# Patient Record
Sex: FEMALE | Race: ASIAN | NOT HISPANIC OR LATINO | Employment: UNEMPLOYED | URBAN - METROPOLITAN AREA
[De-identification: names, ages, dates, MRNs, and addresses within clinical notes are randomized per-mention and may not be internally consistent; named-entity substitution may affect disease eponyms.]

---

## 2017-01-04 DIAGNOSIS — Z22.7 LATENT TUBERCULOSIS BY BLOOD TEST: Primary | ICD-10-CM

## 2017-09-13 ENCOUNTER — DOCUMENTATION ONLY (OUTPATIENT)
Dept: FAMILY MEDICINE | Facility: CLINIC | Age: 57
End: 2017-09-13

## 2017-09-13 NOTE — PROGRESS NOTES
Letter mailed to patient on 9/12/17 regarding overdue mammogram to call Pilar or Landy to schedule.  Pilar Osorio

## 2017-10-13 ENCOUNTER — TRANSFERRED RECORDS (OUTPATIENT)
Dept: HEALTH INFORMATION MANAGEMENT | Facility: CLINIC | Age: 57
End: 2017-10-13

## 2018-01-07 ENCOUNTER — HEALTH MAINTENANCE LETTER (OUTPATIENT)
Age: 58
End: 2018-01-07

## 2021-12-08 ENCOUNTER — HOSPITAL ENCOUNTER (EMERGENCY)
Facility: HOSPITAL | Age: 61
Discharge: HOME | End: 2021-12-08
Admitting: STUDENT IN AN ORGANIZED HEALTH CARE EDUCATION/TRAINING PROGRAM
Payer: COMMERCIAL

## 2021-12-08 ENCOUNTER — APPOINTMENT (OUTPATIENT)
Dept: ULTRASOUND IMAGING | Facility: HOSPITAL | Age: 61
End: 2021-12-08
Payer: COMMERCIAL

## 2021-12-08 ENCOUNTER — APPOINTMENT (OUTPATIENT)
Dept: CT IMAGING | Facility: HOSPITAL | Age: 61
End: 2021-12-08
Attending: STUDENT IN AN ORGANIZED HEALTH CARE EDUCATION/TRAINING PROGRAM
Payer: COMMERCIAL

## 2021-12-08 VITALS
OXYGEN SATURATION: 97 % | WEIGHT: 139.33 LBS | RESPIRATION RATE: 18 BRPM | HEART RATE: 70 BPM | TEMPERATURE: 98.3 F | BODY MASS INDEX: 27.21 KG/M2 | DIASTOLIC BLOOD PRESSURE: 94 MMHG | SYSTOLIC BLOOD PRESSURE: 159 MMHG

## 2021-12-08 DIAGNOSIS — R51.9 NONINTRACTABLE EPISODIC HEADACHE, UNSPECIFIED HEADACHE TYPE: ICD-10-CM

## 2021-12-08 DIAGNOSIS — M54.9 BACK PAIN: ICD-10-CM

## 2021-12-08 DIAGNOSIS — M79.605 PAIN OF LEFT LOWER EXTREMITY: ICD-10-CM

## 2021-12-08 LAB
ANION GAP SERPL CALCULATED.3IONS-SCNC: 8 MMOL/L (ref 5–18)
BUN SERPL-MCNC: 9 MG/DL (ref 8–22)
CALCIUM SERPL-MCNC: 9.4 MG/DL (ref 8.5–10.5)
CHLORIDE BLD-SCNC: 101 MMOL/L (ref 98–107)
CO2 SERPL-SCNC: 23 MMOL/L (ref 22–31)
CREAT SERPL-MCNC: 0.79 MG/DL (ref 0.6–1.1)
ERYTHROCYTE [DISTWIDTH] IN BLOOD BY AUTOMATED COUNT: 11.1 % (ref 10–15)
FLUAV RNA SPEC QL NAA+PROBE: NEGATIVE
FLUBV RNA RESP QL NAA+PROBE: NEGATIVE
GFR SERPL CREATININE-BSD FRML MDRD: 80 ML/MIN/1.73M2
GLUCOSE BLD-MCNC: 142 MG/DL (ref 70–125)
HCT VFR BLD AUTO: 41 % (ref 35–47)
HGB BLD-MCNC: 14.4 G/DL (ref 11.7–15.7)
MCH RBC QN AUTO: 33.3 PG (ref 26.5–33)
MCHC RBC AUTO-ENTMCNC: 35.1 G/DL (ref 31.5–36.5)
MCV RBC AUTO: 95 FL (ref 78–100)
PLATELET # BLD AUTO: 316 10E3/UL (ref 150–450)
POTASSIUM BLD-SCNC: 4.3 MMOL/L (ref 3.5–5)
RBC # BLD AUTO: 4.32 10E6/UL (ref 3.8–5.2)
SARS-COV-2 RNA RESP QL NAA+PROBE: NEGATIVE
SODIUM SERPL-SCNC: 132 MMOL/L (ref 136–145)
WBC # BLD AUTO: 6 10E3/UL (ref 4–11)

## 2021-12-08 PROCEDURE — 36415 COLL VENOUS BLD VENIPUNCTURE: CPT | Performed by: PHYSICIAN ASSISTANT

## 2021-12-08 PROCEDURE — 70450 CT HEAD/BRAIN W/O DYE: CPT

## 2021-12-08 PROCEDURE — C9803 HOPD COVID-19 SPEC COLLECT: HCPCS

## 2021-12-08 PROCEDURE — 85027 COMPLETE CBC AUTOMATED: CPT | Performed by: PHYSICIAN ASSISTANT

## 2021-12-08 PROCEDURE — 99285 EMERGENCY DEPT VISIT HI MDM: CPT | Mod: 25

## 2021-12-08 PROCEDURE — 87636 SARSCOV2 & INF A&B AMP PRB: CPT | Performed by: PHYSICIAN ASSISTANT

## 2021-12-08 PROCEDURE — 93971 EXTREMITY STUDY: CPT | Mod: LT

## 2021-12-08 PROCEDURE — 80048 BASIC METABOLIC PNL TOTAL CA: CPT | Performed by: PHYSICIAN ASSISTANT

## 2021-12-08 RX ORDER — ACETAMINOPHEN 325 MG/1
975 TABLET ORAL ONCE
Status: DISCONTINUED | OUTPATIENT
Start: 2021-12-08 | End: 2021-12-08

## 2021-12-08 RX ORDER — IBUPROFEN 200 MG
400 TABLET ORAL EVERY 6 HOURS PRN
Qty: 30 TABLET | Refills: 0 | Status: SHIPPED | OUTPATIENT
Start: 2021-12-08 | End: 2024-01-24

## 2021-12-08 NOTE — ED PROVIDER NOTES
Headache, back pain and leg pain.    Symptoms started a long time ago.  Since yesterday all symptoms have presented together and are worsening.      No chest pain, abdominal pain.  No difficulty breathing.      Has had fevers but these are helped with tylenol.     Left knee pain.           Ginny Reeder PA-C  12/08/21 9769

## 2021-12-08 NOTE — ED PROVIDER NOTES
ED PROVIDER NOTE    EMERGENCY DEPARTMENT ENCOUNTER      NAME: Doug Davidson  AGE: 61 year old female  YOB: 1960  MRN: [id number]  EVALUATION DATE & TIME: No admission date for patient encounter.    PCP: [facility]    ED PROVIDER: Ginny Reeder PA-C      Chief Complaint   Patient presents with     Headache     Back Pain         FINAL IMPRESSION:  1. Nonintractable episodic headache, unspecified headache type    2. Back pain    3. Pain of left lower extremity          MEDICAL DECISION MAKING:    Pertinent Labs & Imaging studies reviewed. (See chart for details)  61 year old female with a h/o cholelithiasis, HTN, gastritic, constipation, depression presents to the Emergency Department for evaluation of headache, leg pain and back pain.  It sounds like she has had all the symptoms for quite some time but since yesterday they have all presented together.  Tylenol does help.  No chest pain, difficulty breathing, abdominal pain.    She is hypertensive here but otherwise vital stable.  Clinically looks quite well.  No reproducible tenderness in the neck, back or extremities.  Presentation seems most consistent with chronic pain, perhaps some arthritis and tension headache however there is still some language barrier even with an  and I think it is reasonable to get a scan of her head, ultrasound of the left leg to make sure there is no signs of DVT.  We will also just check a CBC and BMP.  Anemia, metabolic imbalance, dehydration, [place] may cause headaches as well.    Workup reveals slighty hyponatremia but I do not feel this is causing her symptoms.  Will encourage electrolytes at home  Covid negative. CBC unremarkable.  Ct head unremarkable. US left leg negative.    No acute findings on exam or workup today. Patient stable for discharge home. Encouraged hydration and close f/u with pcp.      At the conclusion of the encounter I discussed the results of all of the tests and the disposition. The  "questions were answered. The patient or family acknowledged understanding and was agreeable with the care plan.       ED COURSE  12:20 PM  Met and evaluated patient. Discussed ED plan. PPE: N95 mask, gloves, protective eyewear  2:33 PM  Updated patient on results and plan for discharge.        MEDICATIONS GIVEN IN THE EMERGENCY:  Medications - No data to display    NEW PRESCRIPTIONS STARTED AT TODAY'S ER VISIT  New Prescriptions    No medications on file          =================================================================    HPI    Patient information was obtained from: Patient    Use of Intrepreter: Yes (phone) - Language: Lithuanian via language line      Doug Davidson is a 61 year old female with a history cholelithiasis, HTN, gastritic, constipation, depression, who presents for evaluation of headache, back pain and leg pain.  Reports that she has had pain in all these areas for several years but usually they present independently of one another.  Since yesterday she has been experiencing all of the symptoms at once and they seem to be worsening.  She has been using Tylenol which does seem to temporarily help with her symptoms.  Her last dose was prior to coming to the emergency department.  She reports that her leg pain is a little bit worse in the left leg, behind the left knee.  She denies any chest pain, abdominal pain, cough, difficulty breathing.  She reports \"fevers\" but that they are helped with Tylenol.  Denies any new trauma.  No  Numbness, weakness, vision changes      REVIEW OF SYSTEMS   Constitutional: + subjective fevers  Vision: Negative for vision changes  HENT:  + headache.  Negative for congestion, sore throat  Pulmonary: Negative for shortness of breath  Cardiac: Negative for chest pain  GI:Negative for nausea, vomiting, diarrhea, abdominal pain  : Negative for urinary symptoms  Musculoskeletal: + back pain and leg pain  Skin: Negative for skin changes  Endocrine: Negative for weight " loss  Neuro: Negative for weakness, numbness    All other systems reviewed and are negative        PAST MEDICAL HISTORY:  HTN    PAST SURGICAL HISTORY:  History reviewed. No pertinent surgical history.        CURRENT MEDICATIONS:    No current facility-administered medications for this encounter.    Current Outpatient Medications:      ranitidine (ZANTAC) 300 MG tablet, Take 1 tablet (300 mg) by mouth At Bedtime, Disp: 30 tablet, Rfl: 1    ALLERGIES:  No Known Allergies    FAMILY HISTORY:  History reviewed. No pertinent family history.    SOCIAL HISTORY:   Smoking: Denies      VITALS:  Vitals:    12/08/21 1127   BP: (!) 188/102   Pulse: 74   Resp: 18   Temp: 98  F (36.7  C)   TempSrc: Oral   SpO2: 92%   Weight: 63.2 kg (139 lb 5.3 oz)       PHYSICAL EXAM    General Appearance:  Alert, cooperative, no distress, appears stated age  HENT: Normocephalic without obvious deformity, atraumatic. Mucous membranes moist. No meningismus.   Eyes: Conjunctiva clear, Lids normal. No discharge.   Respiratory: No distress. Lungs clear to ausculation bilaterally. No wheezes, rhonchi or stridor  Cardiovascular: Regular rate and rhythm, no murmur. Normal cap refill. No peripheral edema  GI: Abdomen soft, nontender  : No CVA tenderness  Musculoskeletal: Moving all extremities. No gross deformities. No neck or back tenderness. Normal ROM of all the extremities. No swelling.  2+ DP pulses.    Integument: Warm, dry, no rashes or lesions  Neurologic: Alert and orientated x3. No focal deficits. Face symmetric. PERRL. EOMI.   Psych: Normal mood and affect        LAB:  Labs Ordered and Resulted from Time of ED Arrival to Time of ED Departure   BASIC METABOLIC PANEL - Abnormal       Result Value    Sodium 132 (*)     Potassium 4.3      Chloride 101      Carbon Dioxide (CO2) 23      Anion Gap 8      Urea Nitrogen 9      Creatinine 0.79      Calcium 9.4      Glucose 142 (*)     GFR Estimate 80     CBC WITH PLATELETS - Abnormal    WBC Count 6.0       RBC Count 4.32      Hemoglobin 14.4      Hematocrit 41.0      MCV 95      MCH 33.3 (*)     MCHC 35.1      RDW 11.1      Platelet Count 316     INFLUENZA A/B & SARS-COV2 PCR MULTIPLEX - Normal    Influenza A PCR Negative      Influenza B PCR Negative      SARS CoV2 PCR Negative         RADIOLOGY:  US Lower Extremity Venous Duplex Left   Final Result   IMPRESSION:   1.  No deep venous thrombosis in the left lower extremity.      Head CT w/o contrast   Final Result   IMPRESSION:   1.  No acute intracranial hemorrhage, mass effect, or CT evidence for acute infarction.   2.  Mild global brain parenchymal volume loss.   3.  Mild mucosal thickening throughout the paranasal sinuses, greatest in the ethmoid air cells and sphenoid sinuses. While no layering fluid levels are identified, recommend correlation for acute sinusitis as a source of headache.              I, Pennie Martin, am serving as a scribe to document services personally performed by Ginny Reeder PA-C based on my observation and the provider's statements to me. I, Ginny Reeder PA-C attest that Pennie Martin is acting in a scribe capacity, has observed my performance of the services and has documented them in accordance with my direction.    Ginny Reeder PA-C   Emergency Medicine       Ginny Reeder PA-C  12/09/21 8517

## 2021-12-08 NOTE — ED TRIAGE NOTES
Pt comes in with head and back pain that started yesterday. Speaks Maori, language barrier. Took some medicine yesterday, unable to recall what is was. Pt takes 8 or 9 medications per daughter. Pt had fever yesterday. States vaccinated for Covid.

## 2021-12-08 NOTE — DISCHARGE INSTRUCTIONS
Your work-up today does not show any emergent findings.    Your head CT does show some inflammation of the sinuses but this is likely more viral.  Your covid test was negative    Your lab work just shows a little low sodium.  Would make sure you are drinking some fluids with electrolytes and eating a good diet at home.    Please schedule a follow up with your primary care provider in the next week.      If you have worsening pain, fevers, vomiting, neurologic changes, return to the ER.

## 2021-12-08 NOTE — ED PROVIDER NOTES
ED Provider In Triage Note  [facility]  Encounter Date: Dec 8, 2021    Chief Complaint   Patient presents with     Headache     Back Pain       Brief HPI:   Doug Davidson is a 61 year old female presenting to the Emergency Department with a chief complaint of head and back pain. Tried some sort of medication 1 hour PTA, no improvement. Sounds like Tylenol? No history of trauma. Ambulatory.     Brief Physical Exam:  BP (!) 188/102   Pulse 74   Temp 98  F (36.7  C) (Oral)   Resp 18   Wt 63.2 kg (139 lb 5.3 oz)   SpO2 92%   BMI 27.21 kg/m    General: Non-toxic appearing  HEENT: Atraumatic  Resp: No respiratory distress  Abdomen: Non-peritoneal  Neuro: Alert, oriented, answers questions appropriately  Psych: Behavior appropriate    PIT Dispo:   Return to lobby while awaiting workup and ED bed availability    Nelson Hernandez MD on 12/8/2021 at 11:25 AM    Patient was evaluated by the Physician in Triage due to a limitation of available rooms in the Emergency Department. A plan of care was discussed based on the information obtained on the initial evaluation and patient was consuled to return back to the Emergency Department lobby after this initial evalutaiton until results were obtained or a room became available in the Emergency Department. Patient was counseled not to leave prior to receiving the results of their workup.      Nelson Hernandez MD  12/08/21 1576     Dr. Lobo

## 2022-05-01 ENCOUNTER — HOSPITAL ENCOUNTER (EMERGENCY)
Facility: HOSPITAL | Age: 62
Discharge: HOME | End: 2022-05-01
Attending: EMERGENCY MEDICINE | Admitting: EMERGENCY MEDICINE
Payer: COMMERCIAL

## 2022-05-01 ENCOUNTER — APPOINTMENT (OUTPATIENT)
Dept: RADIOLOGY | Facility: HOSPITAL | Age: 62
End: 2022-05-01
Attending: EMERGENCY MEDICINE
Payer: COMMERCIAL

## 2022-05-01 ENCOUNTER — APPOINTMENT (OUTPATIENT)
Dept: MRI IMAGING | Facility: HOSPITAL | Age: 62
End: 2022-05-01
Attending: EMERGENCY MEDICINE
Payer: COMMERCIAL

## 2022-05-01 VITALS
TEMPERATURE: 96.8 F | RESPIRATION RATE: 15 BRPM | WEIGHT: 143.3 LBS | DIASTOLIC BLOOD PRESSURE: 71 MMHG | OXYGEN SATURATION: 95 % | HEART RATE: 73 BPM | BODY MASS INDEX: 27.99 KG/M2 | SYSTOLIC BLOOD PRESSURE: 135 MMHG

## 2022-05-01 DIAGNOSIS — H81.10 BENIGN PAROXYSMAL POSITIONAL VERTIGO, UNSPECIFIED LATERALITY: ICD-10-CM

## 2022-05-01 DIAGNOSIS — M25.562 ACUTE PAIN OF LEFT KNEE: ICD-10-CM

## 2022-05-01 LAB
ANION GAP SERPL CALCULATED.3IONS-SCNC: 13 MMOL/L (ref 5–18)
APTT PPP: 27 SECONDS (ref 22–38)
BUN SERPL-MCNC: 13 MG/DL (ref 8–22)
CALCIUM SERPL-MCNC: 9.5 MG/DL (ref 8.5–10.5)
CHLORIDE BLD-SCNC: 101 MMOL/L (ref 98–107)
CO2 SERPL-SCNC: 19 MMOL/L (ref 22–31)
CREAT SERPL-MCNC: 1.03 MG/DL (ref 0.6–1.1)
ERYTHROCYTE [DISTWIDTH] IN BLOOD BY AUTOMATED COUNT: 11.3 % (ref 10–15)
GFR SERPL CREATININE-BSD FRML MDRD: 61 ML/MIN/1.73M2
GLUCOSE BLD-MCNC: 128 MG/DL (ref 70–125)
GLUCOSE BLDC GLUCOMTR-MCNC: 134 MG/DL (ref 70–99)
HCT VFR BLD AUTO: 37.4 % (ref 35–47)
HGB BLD-MCNC: 13.4 G/DL (ref 11.7–15.7)
INR PPP: 1.03 (ref 0.85–1.15)
MCH RBC QN AUTO: 33.5 PG (ref 26.5–33)
MCHC RBC AUTO-ENTMCNC: 35.8 G/DL (ref 31.5–36.5)
MCV RBC AUTO: 94 FL (ref 78–100)
PLATELET # BLD AUTO: 318 10E3/UL (ref 150–450)
POTASSIUM BLD-SCNC: 3.8 MMOL/L (ref 3.5–5)
RBC # BLD AUTO: 4 10E6/UL (ref 3.8–5.2)
SODIUM SERPL-SCNC: 133 MMOL/L (ref 136–145)
TROPONIN I SERPL-MCNC: 0.01 NG/ML (ref 0–0.29)
WBC # BLD AUTO: 6.5 10E3/UL (ref 4–11)

## 2022-05-01 PROCEDURE — 85730 THROMBOPLASTIN TIME PARTIAL: CPT | Performed by: EMERGENCY MEDICINE

## 2022-05-01 PROCEDURE — 36415 COLL VENOUS BLD VENIPUNCTURE: CPT | Performed by: EMERGENCY MEDICINE

## 2022-05-01 PROCEDURE — 70551 MRI BRAIN STEM W/O DYE: CPT

## 2022-05-01 PROCEDURE — 99285 EMERGENCY DEPT VISIT HI MDM: CPT | Mod: 25

## 2022-05-01 PROCEDURE — 85027 COMPLETE CBC AUTOMATED: CPT | Performed by: EMERGENCY MEDICINE

## 2022-05-01 PROCEDURE — 84484 ASSAY OF TROPONIN QUANT: CPT | Performed by: EMERGENCY MEDICINE

## 2022-05-01 PROCEDURE — 73560 X-RAY EXAM OF KNEE 1 OR 2: CPT | Mod: LT

## 2022-05-01 PROCEDURE — 250N000011 HC RX IP 250 OP 636: Performed by: EMERGENCY MEDICINE

## 2022-05-01 PROCEDURE — 85610 PROTHROMBIN TIME: CPT | Performed by: EMERGENCY MEDICINE

## 2022-05-01 PROCEDURE — 80048 BASIC METABOLIC PNL TOTAL CA: CPT | Performed by: EMERGENCY MEDICINE

## 2022-05-01 PROCEDURE — 93005 ELECTROCARDIOGRAM TRACING: CPT | Performed by: EMERGENCY MEDICINE

## 2022-05-01 RX ORDER — MECLIZINE HYDROCHLORIDE 25 MG/1
25 TABLET ORAL EVERY 8 HOURS PRN
Qty: 10 TABLET | Refills: 0 | Status: SHIPPED | OUTPATIENT
Start: 2022-05-01

## 2022-05-01 RX ORDER — LORAZEPAM 2 MG/ML
0.5 INJECTION INTRAMUSCULAR ONCE
Status: COMPLETED | OUTPATIENT
Start: 2022-05-01 | End: 2022-05-01

## 2022-05-01 RX ORDER — MECLIZINE HYDROCHLORIDE 25 MG/1
25 TABLET ORAL EVERY 8 HOURS PRN
Qty: 10 TABLET | Refills: 0 | Status: SHIPPED | OUTPATIENT
Start: 2022-05-01 | End: 2024-09-26

## 2022-05-01 RX ADMIN — LORAZEPAM 0.5 MG: 2 INJECTION INTRAMUSCULAR; INTRAVENOUS at 14:07

## 2022-05-01 NOTE — ED NOTES
Nursing assessment--    Patient is alert, daughter at the bedside.  Using language line to explain and go over the MRI checklist.  Patient states a sudden onset of dizziness.  Using language line, patient does follow simple commands.  No focal weakness noted. No vision changes.  Is alert and oriented X 3.  She feels as the room is spinning.  Explained ativan medication to her, using to aleve dizziness.

## 2022-05-01 NOTE — DISCHARGE INSTRUCTIONS
Please follow-up with your Primary Care Provider within 3-4 days for a recheck; call to arrange appointment.    Return to the ER for worsening symptoms, sudden onset or severe headache, focal neurologic deficit (for example, facial droop or right arm weakness), worsening knee pain, if the knee becomes red / hot / swollen,, persistent nausea / vomiting, fever or other concerns.

## 2022-05-01 NOTE — ED PROVIDER NOTES
Emergency Department Encounter     Evaluation Date & Time:   2022  1:07 PM    CHIEF COMPLAINT:  Dizziness      Triage Note:  Patient arrives by private car with her daughter for evaluation of sudden onset of dizziness that started approx 30 minutes ago.  Patient reports headache.  Was shopping when symptoms started.      Triage Assessment     Row Name 22 1258       Triage Assessment (Adult)    Airway WDL WDL    Additional Documentation Gladys Coma Scale (Group)       [place] Coma Scale    Best Eye Response 2-->(E2) to pain    Best Motor Response 5-->(M5) localizes pain    Best Verbal Response 5-->(V5) oriented    Gladys Coma Scale Score 12                    Impression and Plan       FINAL IMPRESSION:    ICD-10-CM    1. Benign paroxysmal positional vertigo, unspecified laterality  H81.10    2. Acute pain of left knee  M25.562          ED COURSE & MEDICAL DECISION MAKIN:04 PM Met patient in triage for neuro assessment  1:14 PM I discussed patient's case with Dr. Dickerson, stroke neurology.   4:04 PM Patient feeling significantly improved. Now is complaining of atraumatic left knee pain x 4-5 days and is requesting x-ray.     Doug Davidson is a 61 year old female, history of HTN, who presents for evaluation of room-spinning dizziness that started ~30 minutes ago, worse with opening her eyes. She has associated nausea without vomiting. She reports gradual onset of headache this morning, around 8-9am (4-5 hours ago). She is not anticoagulated.    On presentation, patient appears uncomfortable and is sitting with her eyes closed. She is not fully cooperative with neuro exam, however there are no gross deficits.    I spoke with the Stroke Neurologist who agrees with plan to perform MRI rather than head CT. Clinical picture is more consistent with peripheral etiology of vertigo, thus no stroke code called. Furthermore, if MRI demonstrates a cerebellar stroke, would not likely treat with TNK.    Patient  placed on cardiac monitor, IV access established and blood sent for labs.    Patient given Ativan for vertigo.    EKG performed and demonstrated NSR with borderline prolonged QTc (490ms) with no ischemic changes; troponin WNL (0.01).    Labs remarkable for no leukocytosis, anemia, significant electrolyte derangements or renal impairment.  Coags WNL.    MRI brain with minimal age-related changes and no acute intracranial abnormality.    On reassessment, patient is feeling significantly improved after Ativan.      She is now reporting 4-5 days of atraumatic left knee pain and requesting x-ray.    On exam, there is no appreciable effusion, warmth, erythema and she has good ROM of the knee - I do not suspect septic joint. X-rays left knee with demineralized bones with no evidence of a fracture; mild medial compartment degenerative change; no joint effusion.      Patient discharged to home with follow-up primary care provider.  She was given a prescription for meclizine.  Return precautions provided with assistance from the professional Portuguese .  Patient stable throughout ED course.      At the conclusion of the encounter I discussed the results of all the tests and the disposition. The questions were answered. The patient and family acknowledged understanding and were agreeable with the care plan.      MEDICATIONS GIVEN IN THE EMERGENCY DEPARTMENT:  Medications   LORazepam (ATIVAN) injection 0.5 mg (0.5 mg Intravenous Given 5/1/22 1407)       NEW PRESCRIPTIONS STARTED AT TODAY'S ED VISIT:  Discharge Medication List as of 5/1/2022  5:05 PM      START taking these medications    Details   meclizine (ANTIVERT) 25 MG tablet Take 1 tablet (25 mg) by mouth every 8 hours as needed for dizziness, Disp-10 tablet, R-0, Local Print             HPI     HPI     Secondary to language barrier, history and exam performed with assistance from the professional Portuguese  using the language line on [employer].     History  currently limited secondary to patient's condition.     Doug Davidson is a 61 year old female, history of HTN, who presents to this ED ambulatory with her daughter for evaluation of dizziness. The dizziness started acutely ~30 minutes ago. She describes the dizziness as feeling as though the room is spinning. Symptoms are worse when she opens her eyes. She has associated nausea without vomiting. She had gradual onset of headache this morning, around 8-9am (4-5 hours ago). She is not anticoagulated.    REVIEW OF SYSTEMS:  All other systems reviewed and are negative.      Medical History     No past medical history on file.    No past surgical history on file.    No family history on file.    Social History     Tobacco Use     Smoking status: Current Some Day Smoker       meclizine (ANTIVERT) 25 MG tablet  ibuprofen (ADVIL/MOTRIN) 200 MG tablet  ranitidine (ZANTAC) 300 MG tablet        Physical Exam     First Vitals:  Patient Vitals for the past 24 hrs:   BP Temp Pulse Resp SpO2 Weight   05/01/22 1700 135/71 -- 73 -- 95 % --   05/01/22 1600 139/88 -- 77 -- 99 % --   05/01/22 1445 123/80 -- 72 15 96 % --   05/01/22 1430 112/75 -- 70 23 98 % --   05/01/22 1415 119/68 -- 72 16 96 % --   05/01/22 1400 118/74 -- 72 10 96 % --   05/01/22 1345 119/64 -- 68 11 96 % --   05/01/22 1330 136/77 -- 68 16 92 % --   05/01/22 1251 119/73 96.8  F (36  C) 65 18 99 % 65 kg (143 lb 4.8 oz)       PHYSICAL EXAM:   Physical Exam    GENERAL: Awake, alert.  In moderate acute distress; patient sitting in wheelchair with her eyes closed.   HEENT: Normocephalic, atraumatic. Pupils equal, round and reactive. Conjunctiva normal. Patient not cooperative with EOM testing. TMs normal, BL, without erythema.  NECK: No stridor.  PULMONARY: Symmetrical breath sounds without distress.  Lungs clear to auscultation bilaterally without wheezes, rhonchi or rales.  CARDIO: Regular rate and rhythm.  No significant murmur, rub or gallop.  Radial pulses strong  and symmetrical.  ABDOMINAL: Abdomen soft, non-distended and non-tender to palpation.   EXTREMITIES: Left knee without swelling / effusion, warmth or erythema. No lower extremity swelling or edema.      NEURO: Patient not cooperative with full neuro exam. She is alert and answers some questions - answers are appropriate, however she does not answer all questions. Cranial nerves grossly intact.  Strength 5/5 BL upper and lower extremities. Patient not cooperative with finger-nose-finger testing or heel-shin testing.   PSYCH: Unable to assess.   SKIN: No rashes.     Results     LAB:  All pertinent labs reviewed and interpreted  Labs Ordered and Resulted from Time of ED Arrival to Time of ED Departure   CBC WITH PLATELETS - Abnormal       Result Value    WBC Count 6.5      RBC Count 4.00      Hemoglobin 13.4      Hematocrit 37.4      MCV 94      MCH 33.5 (*)     MCHC 35.8      RDW 11.3      Platelet Count 318     BASIC METABOLIC PANEL - Abnormal    Sodium 133 (*)     Potassium 3.8      Chloride 101      Carbon Dioxide (CO2) 19 (*)     Anion Gap 13      Urea Nitrogen 13      Creatinine 1.03      Calcium 9.5      Glucose 128 (*)     GFR Estimate 61     GLUCOSE BY METER - Abnormal    GLUCOSE BY METER POCT 134 (*)    TROPONIN I - Normal    Troponin I 0.01     INR - Normal    INR 1.03     PARTIAL THROMBOPLASTIN TIME - Normal    aPTT 27         RADIOLOGY:  XR Knee Left 1/2 Views   Final Result   IMPRESSION: Bones are demineralized. No evidence of a fracture. Mild medial compartment degenerative change. No joint effusion.      MR Brain w/o Contrast   Final Result   IMPRESSION:   1.  Minimal age-related changes as above with no acute intracranial abnormality.        EC2022, 13:26; NSR with rate of 65 bpm; borderline prolonged QTc (490ms); no ST-T wave changes consistent with ACS or pericarditis; compared to previous EKG dated 2021, there are no significant changes    EKG independently reviewed and interpreted by  MD Latrice Borja MD  Emergency Medicine  [facility] EMERGENCY DEPARTMENT           Latrice Stein MD  05/01/22 0671

## 2022-05-01 NOTE — ED TRIAGE NOTES
Patient arrives by private car with her daughter for evaluation of sudden onset of dizziness that started approx 30 minutes ago.  Patient reports headache.  Was shopping when symptoms started.      Triage Assessment     Row Name 05/01/22 1258       Triage Assessment (Adult)    Airway WDL WDL    Additional Documentation Gladys Coma Scale (Group)       [place] Coma Scale    Best Eye Response 2-->(E2) to pain    Best Motor Response 5-->(M5) localizes pain    Best Verbal Response 5-->(V5) oriented    Gladys Coma Scale Score 12

## 2022-05-02 LAB
ATRIAL RATE - MUSE: 65 BPM
DIASTOLIC BLOOD PRESSURE - MUSE: 75 MMHG
INTERPRETATION ECG - MUSE: NORMAL
P AXIS - MUSE: 71 DEGREES
PR INTERVAL - MUSE: 154 MS
QRS DURATION - MUSE: 82 MS
QT - MUSE: 472 MS
QTC - MUSE: 490 MS
R AXIS - MUSE: 14 DEGREES
SYSTOLIC BLOOD PRESSURE - MUSE: 131 MMHG
T AXIS - MUSE: 55 DEGREES
VENTRICULAR RATE- MUSE: 65 BPM

## 2024-01-24 ENCOUNTER — HOSPITAL ENCOUNTER (EMERGENCY)
Facility: HOSPITAL | Age: 64
Discharge: HOME | End: 2024-01-24
Attending: EMERGENCY MEDICINE | Admitting: EMERGENCY MEDICINE
Payer: COMMERCIAL

## 2024-01-24 ENCOUNTER — APPOINTMENT (OUTPATIENT)
Dept: ULTRASOUND IMAGING | Facility: HOSPITAL | Age: 64
End: 2024-01-24
Attending: EMERGENCY MEDICINE
Payer: COMMERCIAL

## 2024-01-24 VITALS
DIASTOLIC BLOOD PRESSURE: 89 MMHG | HEART RATE: 85 BPM | SYSTOLIC BLOOD PRESSURE: 160 MMHG | OXYGEN SATURATION: 97 % | RESPIRATION RATE: 18 BRPM | TEMPERATURE: 99.6 F

## 2024-01-24 DIAGNOSIS — M25.562 CHRONIC PAIN OF LEFT KNEE: ICD-10-CM

## 2024-01-24 DIAGNOSIS — G89.29 CHRONIC PAIN OF LEFT KNEE: ICD-10-CM

## 2024-01-24 DIAGNOSIS — M25.862 KNEE JOINT CYST, LEFT: ICD-10-CM

## 2024-01-24 PROBLEM — F41.1 GAD (GENERALIZED ANXIETY DISORDER): Status: ACTIVE | Noted: 2018-04-16

## 2024-01-24 PROBLEM — R19.5 HEME POSITIVE STOOL: Status: ACTIVE | Noted: 2020-08-31

## 2024-01-24 PROBLEM — K29.50 GASTRITIS, CHRONIC: Status: ACTIVE | Noted: 2022-03-23

## 2024-01-24 PROBLEM — Z86.19 HISTORY OF HELICOBACTER PYLORI INFECTION: Status: ACTIVE | Noted: 2023-03-09

## 2024-01-24 PROBLEM — R73.03 PREDIABETES: Status: ACTIVE | Noted: 2023-03-09

## 2024-01-24 PROBLEM — F32.3 MAJOR DEPRESSIVE DISORDER, SINGLE EPISODE, SEVERE WITH PSYCHOTIC FEATURES (H): Status: ACTIVE | Noted: 2018-04-16

## 2024-01-24 PROBLEM — I10 ESSENTIAL HYPERTENSION: Status: ACTIVE | Noted: 2019-09-12

## 2024-01-24 PROCEDURE — 99284 EMERGENCY DEPT VISIT MOD MDM: CPT | Mod: 25

## 2024-01-24 PROCEDURE — 93971 EXTREMITY STUDY: CPT | Mod: LT

## 2024-01-24 RX ORDER — TRAMADOL HYDROCHLORIDE 50 MG/1
50 TABLET ORAL 2 TIMES DAILY PRN
Qty: 10 TABLET | Refills: 0 | Status: SHIPPED | OUTPATIENT
Start: 2024-01-24 | End: 2024-09-26

## 2024-01-24 RX ORDER — IBUPROFEN 200 MG
400 TABLET ORAL EVERY 6 HOURS PRN
Qty: 30 TABLET | Refills: 0 | Status: SHIPPED | OUTPATIENT
Start: 2024-01-24

## 2024-01-24 NOTE — ED TRIAGE NOTES
Left leg pain x 2 days. Denies known injury. Patient has an ace wrap in place under her pant leg. States the pain extends above and below the left knee. Occasionally has lower back pain. Son at bedside and interpreting.

## 2024-01-24 NOTE — ED NOTES
Pt assessed and discharged by provider from [facility]. Pt was discharged to home with family. Ambulated using walker. Instructed to get rx at pharmacy and follow up with the home health referral.

## 2024-01-24 NOTE — ED PROVIDER NOTES
Emergency Department Encounter     Evaluation Date & Time:   No admission date for patient encounter.    CHIEF COMPLAINT:  Leg Pain      Triage Note:Left leg pain x 2 days. Denies known injury. Patient has an ace wrap in place under her pant leg. States the pain extends above and below the left knee. Occasionally has lower back pain. Son at bedside and interpreting.             FINAL IMPRESSION:    ICD-10-CM    1. Knee joint cyst, left  M25.862 Walker Order     Home Care Referral      2. Chronic pain of left knee  M25.562 Home Care Referral    G89.29           Impression and Plan     ED COURSE & MEDICAL DECISION MAKING:  3:22 PM I met with the patient to obtain patient history and performed a physical exam. Discussed plan for ED work up including potential diagnostic studies and interventions.        ED Course as of 01/24/24 1706   Wed Jan 24, 2024   1543 Doug has a stable knee on examination without signs of septic joint, or traumatic effusion.  This is a chronic knee pain for her that is gotten worse.  Her ultrasound was already done from the waiting room which shows likely a chondral cyst on the posterior aspect of the knee and may be worsening her pain.  She does have an orthopedist that she has seen for this problem in the past and they do feel comfortable going back to see that same provider so they will make an appointment there.  She would feel that she would benefit from a walker as she does have significant difficulty getting around so I am what writing for a walker.  She is also interested in getting a shower chair so I have directed them to reach out to her primary care provider to get that set up and   However, with her mobility issues and with the increasing pain I do think she would benefit from a home safety evaluation and that should help to get her set up with the bathing chair.  Our care coordinator did speak with the family and the order was placed in the computer for home health care referral  for ADLs, home safety, PT, OT, and social work, assistance with bathing    At the conclusion of the encounter I discussed the results of all the tests and the disposition. The questions were answered. The patient or family acknowledged understanding and was agreeable with the care plan.          0 minutes of critical care time        MEDICATIONS GIVEN IN THE EMERGENCY DEPARTMENT:  Medications - No data to display    NEW PRESCRIPTIONS STARTED AT TODAY'S ED VISIT:  New Prescriptions    TRAMADOL (ULTRAM) 50 MG TABLET    Take 1 tablet (50 mg) by mouth 2 times daily as needed for severe pain (and to help with sleep due to pain at nighttime)       HPI     HPI     Doug Davidson is a 63 year old female with a pertinent history of HTN, HLD, chronic pain, who presents to this ED via walk-in with son for evaluation of knee pain.     Patient reports sudden onset left knee pain since yesterday evening that has progressively worsened in pain. She has been using a wrap over the knee and ointment that was prescribed by her PCP with no relief of the pain. The pain is worse with movement of the knee. She associates a subjective fever secondary to the pain. She denies any known trauma or injuries to the knee. She is still able to use a cane to ambulate.There were no other concerns/complaints at this time.    REVIEW OF SYSTEMS:  Review of Systems  remainder of systems are all otherwise negative.        Medical History     History reviewed. No pertinent past medical history.    History reviewed. No pertinent surgical history.    History reviewed. No pertinent family history.    Social History     Tobacco Use    Smoking status: Some Days       ibuprofen (ADVIL/MOTRIN) 200 MG tablet  traMADol (ULTRAM) 50 MG tablet  meclizine (ANTIVERT) 25 MG tablet  meclizine (ANTIVERT) 25 MG tablet  ranitidine (ZANTAC) 300 MG tablet        Physical Exam     First Vitals:  Patient Vitals for the past 24 hrs:   BP Temp Temp src Pulse Resp SpO2    01/24/24 1335 (!) 133/98 99.6  F (37.6  C) Temporal 100 20 98 %       PHYSICAL EXAM:   Constitutional:   Sitting in chair. Easily conversable.   HENT:  Normocephalic, wearing a mask  Eyes:  PERRL, EOMI, Conjunctiva normal, No discharge, no scleral icterus.  Respiratory:  Breathing easily, lungs clear to auscultation  Cardiovascular:  Regular rate and rhythm. nl s1s2 0 murmurs, rubs, or gallops.  Peripheral pulses dp, pt, and radial are wnl.  No peripheral edema   GI:  Bowel sounds normal, Soft, No tenderness, No flank tenderness, nondistended.  :No CVA tenderness.   Musculoskeletal:  Moves all extremities.  No erythematous or swollen major joints. Anterior and posterior drawer  test are normal. Active ROM causes pain. No clinking or clunking. Mild joint effusion. No surrounding redness, swelling, or increased wramth.   Integument:  Normal color.  Lymphatic:  No cervical lymphadenopathy  Neurologic:  Alert & oriented x 3, Normal motor function, Normal sensory function, No focal deficits noted. Normal speech.  Psychiatric:  Affect normal, Judgment normal, Mood normal.     Results     LAB AND RADIOLOGY:  All pertinent labs reviewed and interpreted  Results for orders placed or performed during the hospital encounter of 01/24/24   US Lower Extremity Venous Duplex Left     Status: None    Narrative    EXAM: US LOWER EXTREMITY VENOUS DUPLEX LEFT  LOCATION: [facility]  DATE: 1/24/2024    INDICATION: Left leg pain.  COMPARISON: None.  TECHNIQUE: Venous Duplex ultrasound of the left lower extremity with and without compression, augmentation and duplex. Color flow and spectral Doppler with waveform analysis performed.    FINDINGS: Exam includes the common femoral, femoral, popliteal, and contralateral common femoral veins as well as segmentally visualized deep calf veins and greater saphenous vein.     LEFT: No deep vein thrombosis. No superficial thrombophlebitis. Fluid or cyst along the  popliteal fossa measuring 3.4 x 2.8 x 0.6 cm.      Impression    IMPRESSION:  1.  No deep venous thrombosis in the left lower extremity.         ECG:  None    PROCEDURES:  Procedures:      [facility] Documentation     Medical Decision Making  Obtained supplemental history:Supplemental history obtained?: No  Reviewed external records: External records reviewed?: yes.  Previous er visits and rx  Care impacted by chronic illness:Chronic Pain, Hyperlipidemia, and Hypertension  Care significantly affected by social determinants of health:Access to Medical Care  Did you consider but not order tests?: Work up considered but not performed and documented in chart, if applicable  Did you interpret images independently?: Independent interpretation of ECG and images noted in documentation, when applicable.  Consultation discussion with other provider:Did you involve another provider (consultant, , pharmacy, etc.)?: No  Discharge. I prescribed additional prescription strength medication(s) as charted. See documentation for any additional details.         The creation of this record is based on the scribe s observations of the work being performed by Earlene Florentino and the provider s statements to them. This document has been checked and approved by MD Herlinda Sarkar MD  Emergency Medicine  [facility] EMERGENCY DEPARTMENT         Herlinda Kaplan MD  01/24/24 8328

## 2024-01-24 NOTE — DISCHARGE INSTRUCTIONS
Make an appointment to see the bone specialist that you have seen previously.  Sometimes they can drain the cyst that is located on your left knee to help with the pain control.    Use the walker to help you to get around for support and for increased pain control.    I did set up a home referral for them to go to your home to get a home assessment which may help you to qualify for more things than just the shower chair for mobility at home.

## 2024-01-25 NOTE — PROGRESS NOTES
Met with patient and son  to review role of care management, progression of care and possible need for services at discharge, including OP services, home care, or skilled nursing care. Patient alert, oriented and engaged in the conversation.     Provider asked for assist to get home care established. CM discussed w/pt and son about getting PMD information. CM added to file, homecare referral sent and pt discharged home. Pt to get PT/OT/SW for home eval for safety as well as therapy.

## 2024-03-22 ENCOUNTER — OFFICE VISIT (OUTPATIENT)
Dept: FAMILY MEDICINE | Facility: CLINIC | Age: 64
End: 2024-03-22
Payer: COMMERCIAL

## 2024-03-22 VITALS
HEART RATE: 83 BPM | SYSTOLIC BLOOD PRESSURE: 128 MMHG | TEMPERATURE: 98.5 F | RESPIRATION RATE: 14 BRPM | DIASTOLIC BLOOD PRESSURE: 84 MMHG | WEIGHT: 138 LBS | HEIGHT: 60 IN | OXYGEN SATURATION: 98 % | BODY MASS INDEX: 27.09 KG/M2

## 2024-03-22 DIAGNOSIS — G89.29 CHRONIC PAIN OF LEFT KNEE: Primary | ICD-10-CM

## 2024-03-22 DIAGNOSIS — H54.7 VISION PROBLEMS: ICD-10-CM

## 2024-03-22 DIAGNOSIS — M25.562 CHRONIC PAIN OF LEFT KNEE: Primary | ICD-10-CM

## 2024-03-22 PROCEDURE — 99203 OFFICE O/P NEW LOW 30 MIN: CPT | Performed by: NURSE PRACTITIONER

## 2024-03-22 RX ORDER — MULTIVIT-MIN/IRON/FOLIC ACID/K 18-600-40
CAPSULE ORAL
COMMUNITY
Start: 2024-03-09

## 2024-03-22 RX ORDER — NAPROXEN 500 MG/1
500 TABLET ORAL 2 TIMES DAILY WITH MEALS
Qty: 60 TABLET | Refills: 1 | Status: SHIPPED | OUTPATIENT
Start: 2024-03-22

## 2024-03-22 ASSESSMENT — PAIN SCALES - GENERAL: PAINLEVEL: MILD PAIN (2)

## 2024-03-22 NOTE — PROGRESS NOTES
Assessment & Plan     Chronic pain of left knee  X-rays shows only mild degenerative changes, and have her follow-up with orthopedics.  She does have normal creatinine and GFR levels, some naproxen to be used to help with inflammation and pain and from there and have orthopedic doctor assess.  - REVIEW OF HEALTH MAINTENANCE PROTOCOL ORDERS  - Orthopedic  Referral; Future  - naproxen (NAPROSYN) 500 MG tablet; Take 1 tablet (500 mg) by mouth 2 times daily (with meals)    Vision problems  - Adult Eye  Referral; Future        Nicotine/Tobacco Cessation  She reports that she has been smoking. She does not have any smokeless tobacco history on file.        BMI  Estimated body mass index is 26.95 kg/m  as calculated from the following:    Height as of this encounter: 1.524 m (5').    Weight as of this encounter: 62.6 kg (138 lb).         FUTURE APPOINTMENTS:       - Follow-up for annual visit or as needed    Namrata Camacho is a 63 year old, presenting for the following health issues:  Leg Pain (Follow up)    HPI     Communication has been through an  on the phone.    Has had 1 month history of knee pain, no known trauma or injury.  Pain just started.  She was seen in the emergency department where an ultrasound of her leg was done and was negative for DVT, knee x-ray showed mild degenerative changes and it on January 2024.  Patient is also asking for a referral to see an eye doctor as she is having difficulty with seeing both distance and close-up.      Review of Systems  Constitutional, HEENT, cardiovascular, pulmonary, gi and gu systems are negative, except as otherwise noted.      Objective    /84 (BP Location: Right arm, Patient Position: Sitting, Cuff Size: Adult Regular)   Pulse 83   Temp 98.5  F (36.9  C) (Tympanic)   Resp 14   Ht 1.524 m (5')   Wt 62.6 kg (138 lb)   SpO2 98%   BMI 26.95 kg/m    Body mass index is 26.95 kg/m .  Physical Exam  Constitutional:        Appearance: Normal appearance.   HENT:      Head: Normocephalic.   Pulmonary:      Effort: Pulmonary effort is normal. No respiratory distress.   Musculoskeletal:      Right knee: Normal.      Left knee: Normal. No effusion or erythema. Normal range of motion. No LCL laxity, MCL laxity, ACL laxity or PCL laxity.     Instability Tests: Anterior drawer test negative. Posterior drawer test negative. Anterior Lachman test negative. Medial Destinee test negative and lateral Destinee test negative.      Comments: Mild lateral joint pain with palpation   Skin:     General: Skin is warm and dry.   Neurological:      Mental Status: She is alert and oriented to person, place, and time.   Psychiatric:         Mood and Affect: Mood normal.         Behavior: Behavior normal.         Thought Content: Thought content normal.         Judgment: Judgment normal.                    Signed Electronically by: NEIDA Prince CNP

## 2024-04-05 ENCOUNTER — OFFICE VISIT (OUTPATIENT)
Dept: ORTHOPEDICS | Facility: CLINIC | Age: 64
End: 2024-04-05
Attending: NURSE PRACTITIONER
Payer: COMMERCIAL

## 2024-04-05 VITALS
SYSTOLIC BLOOD PRESSURE: 134 MMHG | BODY MASS INDEX: 26.95 KG/M2 | WEIGHT: 138 LBS | DIASTOLIC BLOOD PRESSURE: 83 MMHG | HEART RATE: 98 BPM

## 2024-04-05 DIAGNOSIS — M17.12 ARTHRITIS OF LEFT KNEE: Primary | ICD-10-CM

## 2024-04-05 DIAGNOSIS — G89.29 CHRONIC PAIN OF LEFT KNEE: ICD-10-CM

## 2024-04-05 DIAGNOSIS — M25.562 CHRONIC PAIN OF LEFT KNEE: ICD-10-CM

## 2024-04-05 PROCEDURE — 99244 OFF/OP CNSLTJ NEW/EST MOD 40: CPT | Performed by: PEDIATRICS

## 2024-04-05 NOTE — PROGRESS NOTES
ASSESSMENT & PLAN    Doug was seen today for pain.    Diagnoses and all orders for this visit:    Arthritis of left knee  -     Physical Therapy  Referral; Future    Chronic pain of left knee  -     Orthopedic  Referral  -     Physical Therapy  Referral; Future      This issue is acute on chronic and Unchanged.        ICD-10-CM    1. Arthritis of left knee  M17.12 Physical Therapy  Referral      2. Chronic pain of left knee  M25.562 Orthopedic  Referral    G89.29 Physical Therapy  Referral        Patient Instructions   Likely flare of underlying arthritis.    Discussed nature of degenerative arthrosis of the knee. Discussed symptom treatment with over-the-counter medications, ice or heat, topical treatments, and rest if needed. Discussed use of sleeve or wrap for comfort. Discussed benefits of exercise and physical therapy. Discussed injection therapy. Also briefly discussed future consideration of referral to orthopedic surgery for further evaluation and discussion of arthroplasty.     Plan:  - Today's Plan of Care:  Rehab: Physical Therapy: [facility] - [phone]    Discussed bracing options    Discussed activity considerations and other supportive care including Ice/Heat, OTC and other topical medications as needed.    -We also discussed other future treatment options:  Consideration of injections  Referral to Orthopedic Surgery    Follow Up: as needed    Concerning signs and symptoms were reviewed and all questions were answered at this time.    Thanks for the opportunity to participate in the care of this patient, I will keep you updated on their progress.    CC: Suzanne Bob MD [facility]  Sports Medicine Physician  [facility]    -----  Chief Complaint   Patient presents with    Left Knee - Pain       SUBJECTIVE  Doug Davidson is a/an 63 year old female who is seen in consultation at the  request of  Suzanne Thayer C.N.P. for evaluation of left knee pain.     The patient is seen with their daughter,  via iPAD    Onset: Chronic, years(s), but recently got worse in the past 2 months. Reports insidious onset without acute precipitating event.  Location of Pain: anterior, posterior knee pain   Worsened by: TTP, up stairs, walking   Better with: Naproxen  Treatments tried: no treatment tried to date  Associated symptoms: swelling     - Went to the ED in January for acute knee pain and swelling. US at that point showed likely popliteal cyst.  - Followed up with PCP in March.  - Per chart review, saw Orthopedics last year 2/2023    Orthopedic/Surgical history: NO  Social History/Occupation: retired     REVIEW OF SYSTEMS:  Review of Systems    OBJECTIVE:  /83   Pulse 98   Wt 62.6 kg (138 lb)   BMI 26.95 kg/m     General: healthy, alert and in no distress  Skin: no suspicious lesions or rash.  CV: distal perfusion intact  Resp: normal respiratory effort without conversational dyspnea   Psych: normal mood and affect  Gait: NORMAL  Neuro: Normal light sensory exam of lower extremity    Left Knee exam    Inspection:      no effusion left    Patella:      Crepitus noted in the patellofemoral joint left    Tender:      medial patellar border left       medial joint line left    Non Tender:      remainder of knee area left    Knee ROM:      Full active and passive ROM with flexion and extension left    Hip ROM:     Full active and passive ROM bilateral    Strength:      5-/5 with knee extension left    Special Tests:     neg (-) Destinee left       neg (-) anterior drawer left       neg (-) posterior drawer left       neg (-) varus at 0 deg and 30 deg left       neg (-) valgus at 0 deg and 30 deg left      Gait:      normal    Neurovascular:      2+ peripheral pulses bilaterally and brisk capillary refill       sensation grossly intact      RADIOLOGY:  Final results and radiologist's  interpretation, available in the [facility] health record.  Images were reviewed with the patient in the office today.  My personal interpretation of the performed imaging:    Reviewed prior XRs from OSH 1/24/2024  AP and sunrise bilateral and left lateral XR views of knees reviewed: no acute bony abnormality, mild -  moderate degenerative changes, some cystic changes  - will follow official read    Reviewed ED and PCP note  Review of the result(s) of each unique test - XR

## 2024-04-05 NOTE — PATIENT INSTRUCTIONS
Likely flare of underlying arthritis.    Discussed nature of degenerative arthrosis of the knee. Discussed symptom treatment with over-the-counter medications, ice or heat, topical treatments, and rest if needed. Discussed use of sleeve or wrap for comfort. Discussed benefits of exercise and physical therapy. Discussed injection therapy. Also briefly discussed future consideration of referral to orthopedic surgery for further evaluation and discussion of arthroplasty.     Plan:  - Today's Plan of Care:  Rehab: Physical Therapy: [facility] - [phone]    Discussed bracing options    Discussed activity considerations and other supportive care including Ice/Heat, OTC and other topical medications as needed.    -We also discussed other future treatment options:  Consideration of injections  Referral to Orthopedic Surgery    Follow Up: as needed    If you have any further questions for your physician or physician s care team you can call [phone] and use option 3 to leave a voice message.

## 2024-04-05 NOTE — LETTER
4/5/2024         RE: Doug Davidson  [address] [address]        Dear Colleague,    Thank you for referring your patient, Doug Davidson, to the [facility]. Please see a copy of my visit note below.    ASSESSMENT & PLAN    Doug was seen today for pain.    Diagnoses and all orders for this visit:    Arthritis of left knee  -     Physical Therapy  Referral; Future    Chronic pain of left knee  -     Orthopedic  Referral  -     Physical Therapy  Referral; Future      This issue is acute on chronic and Unchanged.        ICD-10-CM    1. Arthritis of left knee  M17.12 Physical Therapy  Referral      2. Chronic pain of left knee  M25.562 Orthopedic  Referral    G89.29 Physical Therapy  Referral        Patient Instructions   Likely flare of underlying arthritis.    Discussed nature of degenerative arthrosis of the knee. Discussed symptom treatment with over-the-counter medications, ice or heat, topical treatments, and rest if needed. Discussed use of sleeve or wrap for comfort. Discussed benefits of exercise and physical therapy. Discussed injection therapy. Also briefly discussed future consideration of referral to orthopedic surgery for further evaluation and discussion of arthroplasty.     Plan:  - Today's Plan of Care:  Rehab: Physical Therapy: [facility] - [phone]    Discussed bracing options    Discussed activity considerations and other supportive care including Ice/Heat, OTC and other topical medications as needed.    -We also discussed other future treatment options:  Consideration of injections  Referral to Orthopedic Surgery    Follow Up: as needed    Concerning signs and symptoms were reviewed and all questions were answered at this time.    Thanks for the opportunity to participate in the care of this patient, I will keep you updated on their progress.    CC: Suzanne Thayer   NIC Bob MD CAQ  Sports Medicine Physician  [facility]    -----  Chief Complaint   Patient presents with     Left Knee - Pain       SUBJECTIVE  Doug Davidson is a/an 63 year old female who is seen in consultation at the request of  Suzanne Thayer C.N.P. for evaluation of left knee pain.     The patient is seen with their daughter    Onset: Chronic, years(s), but recently got worse in the past 2 months. Reports insidious onset without acute precipitating event.  Location of Pain: anterior, posterior knee pain   Worsened by: TTP, up stairs, walking   Better with: Naproxen  Treatments tried: no treatment tried to date  Associated symptoms: swelling     - Went to the ED in January for acute knee pain and swelling. US at that point showed likely popliteal cyst.  - Followed up with PCP in March.  - Per chart review, saw Orthopedics last year 2/2023    Orthopedic/Surgical history: NO  Social History/Occupation: retired     REVIEW OF SYSTEMS:  Review of Systems    OBJECTIVE:  /83   Pulse 98   Wt 62.6 kg (138 lb)   BMI 26.95 kg/m     General: healthy, alert and in no distress  Skin: no suspicious lesions or rash.  CV: distal perfusion intact  Resp: normal respiratory effort without conversational dyspnea   Psych: normal mood and affect  Gait: NORMAL  Neuro: Normal light sensory exam of lower extremity    Left Knee exam    Inspection:      no effusion left    Patella:      Crepitus noted in the patellofemoral joint left    Tender:      medial patellar border left       medial joint line left    Non Tender:      remainder of knee area left    Knee ROM:      Full active and passive ROM with flexion and extension left    Hip ROM:     Full active and passive ROM bilateral    Strength:      5-/5 with knee extension left    Special Tests:     neg (-) Destinee left       neg (-) anterior drawer left       neg (-) posterior drawer left       neg (-) varus at 0 deg and 30 deg  left       neg (-) valgus at 0 deg and 30 deg left      Gait:      normal    Neurovascular:      2+ peripheral pulses bilaterally and brisk capillary refill       sensation grossly intact      RADIOLOGY:  Final results and radiologist's interpretation, available in the [facility] health record.  Images were reviewed with the patient in the office today.  My personal interpretation of the performed imaging:    Reviewed prior XRs from OSH 1/24/2024  AP and sunrise bilateral and left lateral XR views of knees reviewed: no acute bony abnormality, mild -  moderate degenerative changes, some cystic changes  - will follow official read    Reviewed ED and PCP note  Review of the result(s) of each unique test - XR             Again, thank you for allowing me to participate in the care of your patient.        Sincerely,        Gisele Bob MD

## 2024-06-21 ENCOUNTER — THERAPY VISIT (OUTPATIENT)
Dept: PHYSICAL THERAPY | Facility: CLINIC | Age: 64
End: 2024-06-21
Payer: COMMERCIAL

## 2024-06-21 DIAGNOSIS — M25.571 PAIN IN JOINT, ANKLE AND FOOT, RIGHT: Primary | ICD-10-CM

## 2024-06-21 PROBLEM — F17.200 TOBACCO USE DISORDER: Status: ACTIVE | Noted: 2023-02-01

## 2024-06-21 PROBLEM — F33.1 MAJOR DEPRESSIVE DISORDER, RECURRENT, MODERATE (H): Status: ACTIVE | Noted: 2018-04-16

## 2024-06-21 PROCEDURE — 97035 APP MDLTY 1+ULTRASOUND EA 15: CPT | Mod: GP | Performed by: PHYSICAL THERAPIST

## 2024-06-21 PROCEDURE — 97161 PT EVAL LOW COMPLEX 20 MIN: CPT | Mod: GP | Performed by: PHYSICAL THERAPIST

## 2024-06-21 PROCEDURE — 97110 THERAPEUTIC EXERCISES: CPT | Mod: GP | Performed by: PHYSICAL THERAPIST

## 2024-06-21 ASSESSMENT — ACTIVITIES OF DAILY LIVING (ADL)
LEFS_SCORE(%): INCOMPLETE
PLEASE_INDICATE_YOR_PRIMARY_REASON_FOR_REFERRAL_TO_THERAPY:: FOOT AND/OR ANKLE
LEFS_RAW_SCORE: INCOMPLETE
ANY_OF_YOUR_USUAL_WORK,_HOUSEWORK_OR_SCHOOL_ACTIVITIES: QUITE A BIT OF DIFFICULTY

## 2024-06-21 NOTE — PROGRESS NOTES
PHYSICAL THERAPY EVALUATION  Type of Visit: Evaluation              Subjective       Presenting condition or subjective complaint: right leg feet ankle hurt when walking  Date of onset: 04/05/24 (ankle started hurting a few months ago)    Relevant medical history: High blood pressure   Dates & types of surgery:      Prior diagnostic imaging/testing results: X-ray     Prior therapy history for the same diagnosis, illness or injury:        Prior Level of Function  Transfers:   Ambulation:   ADL:   IADL:     Living Environment  Social support: With family members   Type of home: House; 2-story   Stairs to enter the home:         Ramp: No   Stairs inside the home: Yes 2 Is there a railing: Yes     Help at home: None; Self Cares (home health aide/personal care attendant, family, etc); Home management tasks (cooking, cleaning); Medication and/or finances; Home and Yard maintenance tasks; Assist for driving and community activities; Meals on wheels/meal service  Equipment owned: Straight Cane; Four-point cane     Employment: No    Hobbies/Interests:      Patient goals for therapy: walk without pain    Pain assessment: Pain present     Objective   FOOT/ANKLE EVALUATION  PAIN: Pain is Exacerbated By: walking, especially on uneven terrain  INTEGUMENTARY (edema, incisions):  general edema present around lateral malleolus  POSTURE:   GAIT:   Weightbearing Status:   Assistive Device(s):   Gait Deviations:   BALANCE/PROPRIOCEPTION: Single Leg Stance Eyes Open (seconds): unable either leg  WEIGHT BEARING ALIGNMENT:   NON-WEIGHTBEARING ALIGNMENT:    ROM: AROM WNL    STRENGTH:  4/5 right PF, inv, ev, 5/5 DF, pain present with all resisted tests  FLEXIBILITY:   SPECIAL TESTS:   FUNCTIONAL TESTS:   PALPATION:   + Tenderness at Location Left Right   Gastroc/Soleus  -   Achilles Tendon  -   Anterior Tibialis  +   Posterior Tibialis  +   Incisional     Deltoid Ligament  -   Plantar Fascia  -   Navicular  -   Medial Calcaneal  -    Peroneals  +   Anterior Talofibular Ligament  +   Posterior Talofibular Ligament  +   Calcaneofibular Ligament     Medial Malleolus  -   Lateral Malleolus  +   Anterior Tibiofibular Ligament     Posterior Tibiofibular Ligament       JOINT MOBILITY: WNL    Assessment & Plan   CLINICAL IMPRESSIONS  Medical Diagnosis: right ankle pain    Treatment Diagnosis: right ankle pain   Impression/Assessment: Patient is a 64 year old female with right ankle complaints.  The following significant findings have been identified: Pain, Decreased strength, Impaired balance, and Decreased activity tolerance. These impairments interfere with their ability to perform household chores, household mobility, and community mobility as compared to previous level of function.     Clinical Decision Making (Complexity):  Clinical Presentation: Stable/Uncomplicated  Clinical Presentation Rationale: based on medical and personal factors listed in PT evaluation  Clinical Decision Making (Complexity): Low complexity    PLAN OF CARE  Treatment Interventions:  Modalities: Cryotherapy, Ultrasound  Interventions: Neuromuscular Re-education, Therapeutic Activity, Therapeutic Exercise    Long Term Goals     PT Goal 1  Goal Identifier: walking  Goal Description: Doug will be able to walk 20 min without increased ankle pain  Rationale: to maximize safety and independence with performance of ADLs and functional tasks;to maximize safety and independence within the community  Target Date: 09/13/24      Frequency of Treatment: 1 x per week  Duration of Treatment: 12 weeks    Recommended Referrals to Other Professionals:   Education Assessment:        Risks and benefits of evaluation/treatment have been explained.   Patient/Family/caregiver agrees with Plan of Care.     Evaluation Time:     PT Eval, Low Complexity Minutes (47329): 15       Signing Clinician: Giovanna Frias, PT        [facility]                                                                                    OUTPATIENT PHYSICAL THERAPY      PLAN OF TREATMENT FOR OUTPATIENT REHABILITATION   Patient's Last Name, First Name, Doug Jdud YOB: 1960   Provider's Name   [facility]   Medical Record No.  [id number]     Onset Date: 04/05/24 (ankle started hurting a few months ago)  Start of Care Date: 06/21/24     Medical Diagnosis:  right ankle pain      PT Treatment Diagnosis:  right ankle pain Plan of Treatment  Frequency/Duration: 1 x per week/ 12 weeks    Certification date from 06/21/24 to 09/13/24         See note for plan of treatment details and functional goals     Giovanna Frias, PT                         I CERTIFY THE NEED FOR THESE SERVICES FURNISHED UNDER        THIS PLAN OF TREATMENT AND WHILE UNDER MY CARE     (Physician attestation of this document indicates review and certification of the therapy plan).              Referring Provider:  Zhanna Clemente MD    Initial Assessment  See Epic Evaluation- Start of Care Date: 06/21/24

## 2024-07-19 ENCOUNTER — THERAPY VISIT (OUTPATIENT)
Dept: PHYSICAL THERAPY | Facility: CLINIC | Age: 64
End: 2024-07-19
Payer: COMMERCIAL

## 2024-07-19 DIAGNOSIS — M25.571 PAIN IN JOINT, ANKLE AND FOOT, RIGHT: Primary | ICD-10-CM

## 2024-07-19 PROCEDURE — 97035 APP MDLTY 1+ULTRASOUND EA 15: CPT | Mod: GP | Performed by: PHYSICAL THERAPIST

## 2024-07-19 PROCEDURE — 97110 THERAPEUTIC EXERCISES: CPT | Mod: GP | Performed by: PHYSICAL THERAPIST

## 2024-08-16 ENCOUNTER — THERAPY VISIT (OUTPATIENT)
Dept: PHYSICAL THERAPY | Facility: CLINIC | Age: 64
End: 2024-08-16
Payer: COMMERCIAL

## 2024-08-16 DIAGNOSIS — M25.571 PAIN IN JOINT, ANKLE AND FOOT, RIGHT: Primary | ICD-10-CM

## 2024-08-16 PROCEDURE — 97035 APP MDLTY 1+ULTRASOUND EA 15: CPT | Mod: GP | Performed by: PHYSICAL THERAPIST

## 2024-08-16 PROCEDURE — 97110 THERAPEUTIC EXERCISES: CPT | Mod: GP | Performed by: PHYSICAL THERAPIST

## 2024-08-30 ENCOUNTER — THERAPY VISIT (OUTPATIENT)
Dept: PHYSICAL THERAPY | Facility: CLINIC | Age: 64
End: 2024-08-30
Payer: COMMERCIAL

## 2024-08-30 DIAGNOSIS — M25.571 PAIN IN JOINT, ANKLE AND FOOT, RIGHT: Primary | ICD-10-CM

## 2024-08-30 PROCEDURE — 97110 THERAPEUTIC EXERCISES: CPT | Mod: GP | Performed by: PHYSICAL THERAPIST

## 2024-08-30 PROCEDURE — 97140 MANUAL THERAPY 1/> REGIONS: CPT | Mod: GP | Performed by: PHYSICAL THERAPIST

## 2024-08-30 PROCEDURE — 97035 APP MDLTY 1+ULTRASOUND EA 15: CPT | Mod: GP | Performed by: PHYSICAL THERAPIST

## 2024-09-26 ENCOUNTER — ORDERS ONLY (AUTO-RELEASED) (OUTPATIENT)
Dept: FAMILY MEDICINE | Facility: CLINIC | Age: 64
End: 2024-09-26

## 2024-09-26 ENCOUNTER — OFFICE VISIT (OUTPATIENT)
Dept: FAMILY MEDICINE | Facility: CLINIC | Age: 64
End: 2024-09-26
Payer: COMMERCIAL

## 2024-09-26 VITALS
WEIGHT: 137 LBS | SYSTOLIC BLOOD PRESSURE: 122 MMHG | DIASTOLIC BLOOD PRESSURE: 64 MMHG | BODY MASS INDEX: 26.76 KG/M2 | TEMPERATURE: 97.5 F | OXYGEN SATURATION: 97 % | HEART RATE: 72 BPM

## 2024-09-26 DIAGNOSIS — E11.9 TYPE 2 DIABETES MELLITUS WITHOUT COMPLICATION, WITHOUT LONG-TERM CURRENT USE OF INSULIN (H): ICD-10-CM

## 2024-09-26 DIAGNOSIS — E78.00 HIGH CHOLESTEROL: ICD-10-CM

## 2024-09-26 DIAGNOSIS — H54.7 VISION PROBLEMS: ICD-10-CM

## 2024-09-26 DIAGNOSIS — G47.09 OTHER INSOMNIA: ICD-10-CM

## 2024-09-26 DIAGNOSIS — Z13.820 SCREENING FOR OSTEOPOROSIS: ICD-10-CM

## 2024-09-26 DIAGNOSIS — Z22.7 LATENT TUBERCULOSIS: ICD-10-CM

## 2024-09-26 DIAGNOSIS — Z12.11 SCREEN FOR COLON CANCER: ICD-10-CM

## 2024-09-26 DIAGNOSIS — I10 ESSENTIAL HYPERTENSION: ICD-10-CM

## 2024-09-26 DIAGNOSIS — F41.1 GAD (GENERALIZED ANXIETY DISORDER): ICD-10-CM

## 2024-09-26 DIAGNOSIS — Z76.89 ENCOUNTER TO ESTABLISH CARE: Primary | ICD-10-CM

## 2024-09-26 DIAGNOSIS — R73.03 PREDIABETES: ICD-10-CM

## 2024-09-26 DIAGNOSIS — F33.1 MAJOR DEPRESSIVE DISORDER, RECURRENT, MODERATE (H): ICD-10-CM

## 2024-09-26 DIAGNOSIS — R42 DIZZINESS: ICD-10-CM

## 2024-09-26 DIAGNOSIS — E55.9 VITAMIN D DEFICIENCY: ICD-10-CM

## 2024-09-26 PROCEDURE — 90662 IIV NO PRSV INCREASED AG IM: CPT | Performed by: NURSE PRACTITIONER

## 2024-09-26 PROCEDURE — 91320 SARSCV2 VAC 30MCG TRS-SUC IM: CPT | Performed by: NURSE PRACTITIONER

## 2024-09-26 PROCEDURE — 99214 OFFICE O/P EST MOD 30 MIN: CPT | Mod: 25 | Performed by: NURSE PRACTITIONER

## 2024-09-26 PROCEDURE — 90471 IMMUNIZATION ADMIN: CPT | Performed by: NURSE PRACTITIONER

## 2024-09-26 PROCEDURE — 90480 ADMN SARSCOV2 VAC 1/ONLY CMP: CPT | Performed by: NURSE PRACTITIONER

## 2024-09-26 RX ORDER — ROSUVASTATIN CALCIUM 10 MG/1
10 TABLET, COATED ORAL DAILY
Qty: 30 TABLET | Refills: 0 | Status: SHIPPED | OUTPATIENT
Start: 2024-09-26

## 2024-09-26 RX ORDER — TRAZODONE HYDROCHLORIDE 50 MG/1
50 TABLET, FILM COATED ORAL AT BEDTIME
Qty: 30 TABLET | Refills: 0 | Status: SHIPPED | OUTPATIENT
Start: 2024-09-26

## 2024-09-26 RX ORDER — BENZOCAINE/MENTHOL 6 MG-10 MG
LOZENGE MUCOUS MEMBRANE
COMMUNITY
Start: 2024-08-16

## 2024-09-26 RX ORDER — RIFAMPIN 300 MG/1
600 CAPSULE ORAL DAILY
Qty: 60 CAPSULE | Refills: 3 | Status: CANCELLED | OUTPATIENT
Start: 2024-09-26 | End: 2025-01-24

## 2024-09-26 RX ORDER — MECLIZINE HYDROCHLORIDE 25 MG/1
25 TABLET ORAL 3 TIMES DAILY PRN
Qty: 30 TABLET | Refills: 0 | Status: SHIPPED | OUTPATIENT
Start: 2024-09-26

## 2024-09-26 RX ORDER — GABAPENTIN 100 MG/1
100 CAPSULE ORAL
COMMUNITY
Start: 2024-09-24 | End: 2025-09-24

## 2024-09-26 RX ORDER — TRAZODONE HYDROCHLORIDE 50 MG/1
1 TABLET, FILM COATED ORAL AT BEDTIME
COMMUNITY
Start: 2024-08-09

## 2024-09-26 RX ORDER — AMMONIUM LACTATE 12 G/100G
CREAM TOPICAL
COMMUNITY
Start: 2024-05-03

## 2024-09-26 RX ORDER — HYDROCHLOROTHIAZIDE 12.5 MG/1
12.5 CAPSULE ORAL
COMMUNITY
Start: 2024-04-16

## 2024-09-26 RX ORDER — FLUTICASONE PROPIONATE 50 MCG
2 SPRAY, SUSPENSION (ML) NASAL
COMMUNITY
Start: 2024-07-10

## 2024-09-26 RX ORDER — AMLODIPINE BESYLATE 5 MG/1
5 TABLET ORAL DAILY
Qty: 30 TABLET | Refills: 0 | Status: SHIPPED | OUTPATIENT
Start: 2024-09-26

## 2024-09-26 RX ORDER — QUETIAPINE FUMARATE 50 MG/1
1 TABLET, FILM COATED ORAL AT BEDTIME
COMMUNITY
Start: 2024-08-09

## 2024-09-26 RX ORDER — SERTRALINE HYDROCHLORIDE 100 MG/1
100 TABLET, FILM COATED ORAL DAILY
Qty: 30 TABLET | Refills: 0 | Status: SHIPPED | OUTPATIENT
Start: 2024-09-26

## 2024-09-26 RX ORDER — HYDROCHLOROTHIAZIDE 12.5 MG/1
12.5 TABLET ORAL DAILY
Qty: 30 TABLET | Refills: 0 | Status: SHIPPED | OUTPATIENT
Start: 2024-09-26

## 2024-09-26 RX ORDER — AMLODIPINE BESYLATE 5 MG/1
5 TABLET ORAL DAILY
COMMUNITY
Start: 2024-05-06 | End: 2025-05-06

## 2024-09-26 RX ORDER — QUETIAPINE FUMARATE 50 MG/1
50 TABLET, FILM COATED ORAL 2 TIMES DAILY
Qty: 30 TABLET | Refills: 0 | Status: SHIPPED | OUTPATIENT
Start: 2024-09-26

## 2024-09-26 NOTE — ASSESSMENT & PLAN NOTE
09/26/2024   Positive blood test in 2016 however no chest xray at that time  CT of chest in 2021 with possible prior granulomatous infection   Asymptomatic   Plan:   Recheck lab and if positive chest xray   Treat with 4 months of Rifampin if latent TB

## 2024-09-26 NOTE — ASSESSMENT & PLAN NOTE
09/26/2024   Good control.   Plan:   Recheck labs  Continue medications, refill for 1 month  Hydrochlorothiazide 12.5 mg and amlodipine 5 mg

## 2024-09-26 NOTE — ASSESSMENT & PLAN NOTE
09/26/2024  Component  Ref Range & Units 4 mo ago Resulting Agency   Cholesterol  0 - 199 mg/dL 225 High  [facility]   Triglyceride  <=149 mg/dL 541 High  [facility]   HDL Cholesterol  >=40 mg/dL 31 Low  [facility]   LDL, Cal[facility]  [facility]   Comment: Unable to calculate, raw result outside instrument reportable range.   Non HDL Chol, Calculated  <=159 mg/dL 194 High  [facility]   Cholesterol/HDL Ratio  <=5.0 7.3 High  [facility]   Hours Fasting  8 - 12 Hours 2.0    Was started on Crestor 10 mg after these abnormal labs in 5/2024  Plan:   Refill 1 month of Crestor 10 mg   Recheck labs  Follow up 1 month and adjust dose as needed based on results

## 2024-09-26 NOTE — ASSESSMENT & PLAN NOTE
09/26/2024   Issues with falling asleep   Takes Trazodone 50 mg and tolerates this   Plan:   Refill medication for 1 month   Continue Trazodone 50 mg at bedtime

## 2024-09-26 NOTE — ASSESSMENT & PLAN NOTE
09/26/2024   Was following with psychiatry but would like to transfer care to here.   Taking Seroquel 50 mg daily   Plan:   Refill Seroquel 50 mg 1 month  Follow up on PHQ9 next visit in 4 weeks

## 2024-09-26 NOTE — ASSESSMENT & PLAN NOTE
09/26/2024  Component  Ref Range & Units 4 mo ago   Hemoglobin A1C  <=5.6 % 6.5 High    Estimated Average Glucose (Calc)  < 117 mg/dL 140   T2DM 4 months ago however did not start medication unsure why as it was not addressed in [facility] notes   Recheck labs  Plan:   Follow up on repeat lab level and address starting medication at next visit, there was a lot to address today so we will  Follow up 4 weeks

## 2024-09-26 NOTE — ASSESSMENT & PLAN NOTE
09/26/2024   Was following with psychiatry but would like to transfer care to here.   Taking Zoloft 150 mg daily   Plan:   Refill Zoloft 150 mg 1 month  Follow up on GAD7 next visit in 4 weeks

## 2024-09-26 NOTE — PROGRESS NOTES
Assessment & Plan   Problem List Items Addressed This Visit       Essential hypertension     09/26/2024   Good control.   Plan:   Recheck labs  Continue medications, refill for 1 month  Hydrochlorothiazide 12.5 mg and amlodipine 5 mg          Relevant Medications    hydroCHLOROthiazide 12.5 MG tablet    amLODIPine (NORVASC) 5 MG tablet    Other Relevant Orders    Comprehensive metabolic panel (BMP + Alb, Alk Phos, ALT, AST, Total. Bili, TP)    Primary Care - Care Coordination Referral    CRISTINO (generalized anxiety disorder)     09/26/2024   Was following with psychiatry but would like to transfer care to here.   Taking Zoloft 150 mg daily   Plan:   Refill Zoloft 150 mg 1 month  Follow up on GAD7 next visit in 4 weeks         Relevant Medications    gabapentin (NEURONTIN) 100 MG capsule    traZODone (DESYREL) 50 MG tablet    traZODone (DESYREL) 50 MG tablet    sertraline (ZOLOFT) 100 MG tablet    sertraline (ZOLOFT) 50 MG tablet    Other Relevant Orders    TSH with free T4 reflex    Primary Care - Care Coordination Referral    High cholesterol     09/26/2024  Component  Ref Range & Units 4 mo ago Resulting Agency   Cholesterol  0 - 199 mg/dL 225 High  [facility]   Triglyceride  <=149 mg/dL 541 High  [facility]   HDL Cholesterol  >=40 mg/dL 31 Low  [facility]   LDL, Calculated  [facility]   Comment: Unable to calculate, raw result outside instrument reportable range.   Non HDL Chol, Calculated  <=159 mg/dL 194 High  [facility]   Cholesterol/HDL Ratio  <=5.0 7.3 High  [facility]   Hours Fasting  8 - 12 Hours 2.0    Was started on Crestor 10 mg after these abnormal labs in 5/2024  Plan:   Refill 1 month of Crestor 10 mg   Recheck labs  Follow up 1 month and adjust dose as needed based on results            Relevant Medications    rosuvastatin (CRESTOR) 10 MG tablet    Other Relevant Orders    Lipid panel reflex to direct LDL  Fasting    Apolipoprotein B    Lipoprotein (a)    Latent tuberculosis     09/26/2024   Positive blood test in 2016 however no chest xray at that time  CT of chest in 2021 with possible prior granulomatous infection   Asymptomatic   Plan:   Recheck lab and if positive chest xray   Treat with 4 months of Rifampin if latent TB          Relevant Orders    CBC with platelets    XR Chest 2 Views    Quantiferon TB Gold Plus    Primary Care - Care Coordination Referral    Prediabetes    Major depressive disorder, recurrent, moderate (H)     09/26/2024   Was following with psychiatry but would like to transfer care to here.   Taking Seroquel 50 mg daily   Plan:   Refill Seroquel 50 mg 1 month  Follow up on PHQ9 next visit in 4 weeks         Relevant Medications    traZODone (DESYREL) 50 MG tablet    traZODone (DESYREL) 50 MG tablet    QUEtiapine (SEROQUEL) 50 MG tablet    sertraline (ZOLOFT) 100 MG tablet    sertraline (ZOLOFT) 50 MG tablet    Other Relevant Orders    Primary Care - Care Coordination Referral    Type 2 diabetes mellitus without complication, without long-term current use of insulin (H)     09/26/2024  Component  Ref Range & Units 4 mo ago   Hemoglobin A1C  <=5.6 % 6.5 High    Estimated Average Glucose (Calc)  < 117 mg/dL 140   T2DM 4 months ago however did not start medication unsure why as it was not addressed in [facility] notes   Recheck labs  Plan:   Follow up on repeat lab level and address starting medication at next visit, there was a lot to address today so we will  Follow up 4 weeks          Relevant Orders    Comprehensive metabolic panel (BMP + Alb, Alk Phos, ALT, AST, Total. Bili, TP)    Insulin level    Hemoglobin A1c    Primary Care - Care Coordination Referral    Other insomnia     09/26/2024   Issues with falling asleep   Takes Trazodone 50 mg and tolerates this   Plan:   Refill medication for 1 month   Continue Trazodone 50 mg at bedtime          Relevant Medications    traZODone  (DESYREL) 50 MG tablet    Dizziness     09/26/2024  Chronic dizziness   Responds well to Meclizine   Declines vesticular PT/ENT  Plan:   Refill Meclizine 25 mg 1 month  Follow up 4 weeks          Relevant Medications    hydrochlorothiazide (MICROZIDE) 12.5 MG capsule    hydroCHLOROthiazide 12.5 MG tablet    meclizine (ANTIVERT) 25 MG tablet     Other Visit Diagnoses       Encounter to establish care    -  Primary    Screen for colon cancer        Relevant Orders    COLOGUARD([employer])    Vision problems        Screening for osteoporosis        Relevant Orders    DEXA HIP/PELVIS/SPINE - Future    Vitamin D deficiency        Relevant Orders    Vitamin D Deficiency                  BMI  Estimated body mass index is 26.76 kg/m  as calculated from the following:    Height as of 3/22/24: 1.524 m (5').    Weight as of this encounter: 62.1 kg (137 lb).       MEDICATIONS:  Continue current medications without change  FURTHER TESTING:       - DXA (bone density) scan       - Check labs soon when fasting  CONSULTATION/REFERRAL to care coordinator to help with setting up appointments (eye, dexa, colaguard)  FUTURE LABS:       - Schedule a fasting blood draw As able  FUTURE APPOINTMENTS:       - Follow-up visit in 4 weeks      Namrata Camacho is a 64 year old, presenting for the following health issues:  Recheck Medication        9/26/2024    11:18 AM   Additional Questions   Roomed by Yamilex HERNANDEZ CMA   Accompanied by Daughter in law     HPI     Going to two clinics, we want to keep this clinic as the primary.   Refills   Establish care, wants us to take over all prescribing     Born [facility] came [place]    Establish care   Medical:   Hypertension   Anxiety   Depression   Left knee pain 7-8 months   Ankle pain (doing PT)   Latent tuberculosis untreated (+ blood test 2016, no current symptoms)   Hyperlipidemia (lipid panel in 5/2024 abnormal prescribed Crestor at that time)   Insomnia   T2DM (A1c in 5/2024 6.5) not on  medication   Dizziness (meclizine) reports doing well denies need for vestibular therapy or ENT referral     Current concern  Low appetite for last 45 days (no change in weight), no fever, no stomach pain, no change in bowel movements, no problems swallowing, no nausea or issues from medications     Surgical:   Cholecystomy     Medications: (needing refills)   HTN hydrochlorothiazide 12.5 mg and amlodipine 5 mg no problems   Hyperlipidemia Cholesterol Crestor 10 mg   MDD/CRISTINO Seroquel 50 mg daily, Zoloft 150 mg daily working well   Insomnia Trazadone 50 mg at bedtime     Substances:   No alcohol or tobacco currently. Former smoker. Quit 3-4 years ago.     Family history:   No known heart attacks in family   No known colon or breast cancer     Health Maintenance:   Mammogram 3/2023 negative recommended repeat 1 year   Last colon screening was FIT in 1/2023 due discussed options wants to proceed with yearly sample   Dexa no history of broken bones due for Dexa, agrees   Immunizations wants covid/flu     Hyperlipidemia Follow-Up  Are you regularly taking any medication or supplement to lower your cholesterol?   Yes-    Are you having muscle aches or other side effects that you think could be caused by your cholesterol lowering medication?  No    Hypertension Follow-up  Do you check your blood pressure regularly outside of the clinic? No   Are you following a low salt diet? No  Are your blood pressures ever more than 140 on the top number (systolic) OR more   than 90 on the bottom number (diastolic), for example 140/90? No    BP Readings from Last 6 Encounters:   09/26/24 122/64   04/05/24 134/83   03/22/24 128/84   05/01/22 135/71   12/08/21 (!) 159/94   11/22/16 129/85     Wt Readings from Last 4 Encounters:   09/26/24 62.1 kg (137 lb)   04/05/24 62.6 kg (138 lb)   03/22/24 62.6 kg (138 lb)   05/01/22 65 kg (143 lb 4.8 oz)        Review of Systems  Constitutional, HEENT, cardiovascular, pulmonary, gi and gu systems are  negative, except as otherwise noted.      Objective    /64 (BP Location: Right arm, Patient Position: Sitting, Cuff Size: Adult Regular)   Pulse 72   Temp 97.5  F (36.4  C) (Tympanic)   Wt 62.1 kg (137 lb)   SpO2 97%   BMI 26.76 kg/m    Body mass index is 26.76 kg/m .  Physical Exam  HENT:      Head: Normocephalic and atraumatic.      Right Ear: Tympanic membrane, ear canal and external ear normal.      Left Ear: Tympanic membrane, ear canal and external ear normal.      Nose: Nose normal.      Mouth/Throat:      Mouth: Mucous membranes are moist.      Pharynx: Oropharynx is clear.   Eyes:      Extraocular Movements: Extraocular movements intact.      Conjunctiva/sclera: Conjunctivae normal.      Pupils: Pupils are equal, round, and reactive to light.   Cardiovascular:      Rate and Rhythm: Normal rate and regular rhythm.      Pulses: Normal pulses.      Heart sounds: Normal heart sounds.   Pulmonary:      Effort: Pulmonary effort is normal.      Breath sounds: Normal breath sounds.   Abdominal:      General: Bowel sounds are normal.      Palpations: Abdomen is soft.   Musculoskeletal:      Cervical back: Normal range of motion.   Skin:     General: Skin is warm.      Capillary Refill: Capillary refill takes less than 2 seconds.   Neurological:      Mental Status: She is alert and oriented to person, place, and time.   Psychiatric:         Mood and Affect: Mood normal.         Behavior: Behavior normal.         Thought Content: Thought content normal.         Judgment: Judgment normal.           NEIDA Hidalgo student     I was present with the nurse practitioner student who participated in the service and in the documentation of the note. I have verified the history and personally preformed the physical exam and medical decision making. I agree with the assessment and plan of care as documented in the note.    Signed Electronically by: NEIDA Prince CNP

## 2024-09-26 NOTE — ASSESSMENT & PLAN NOTE
09/26/2024  Chronic dizziness   Responds well to Meclizine   Declines vesticular PT/ENT  Plan:   Refill Meclizine 25 mg 1 month  Follow up 4 weeks

## 2024-09-30 ENCOUNTER — PATIENT OUTREACH (OUTPATIENT)
Dept: CARE COORDINATION | Facility: CLINIC | Age: 64
End: 2024-09-30
Payer: COMMERCIAL

## 2024-09-30 NOTE — PROGRESS NOTES
Clinic Care Coordination Contact  [facility]/Voicemail    Clinical Data: Care Coordinator Outreach    Outreach Documentation Number of Outreach Attempt   9/30/2024  12:09 PM 1       The CHW was unable to leave a voicemail due to the phone number not being in service.    Plan: Care Coordinator will try to reach patient again in 1-2 business days.    TIFFANI Power  [phone]  [facility]

## 2024-10-01 NOTE — PROGRESS NOTES
Clinic Care Coordination Contact  Community Health Worker Initial Outreach    CHW Initial Information Gathering:  Referral Source: PCP  Preferred Hospital: [facility]  [phone]  Preferred Urgent Care: [facility], [phone]  Informal Support system:: Family  No PCP office visit in Past Year: No       Patient accepts CC: Yes. Patient scheduled for assessment with the RN on 10/3/24 at 10:00 am. Patient noted desire to discuss making sure that she is having her referrals scheduled. The CHW was unable to schedule a follow up with the SW at this time due to the family member focusing on the medical concerns.     TIFFANI Power  [phone]  Connected Care Resource [facility]

## 2024-10-02 ENCOUNTER — PATIENT OUTREACH (OUTPATIENT)
Dept: CARE COORDINATION | Facility: CLINIC | Age: 64
End: 2024-10-02
Payer: COMMERCIAL

## 2024-10-02 NOTE — PROGRESS NOTES
Attempted to reach patient and left VM. Current preventative visit is overdue. left scheduling number for patient to call. [phone]

## 2024-10-03 ENCOUNTER — LAB (OUTPATIENT)
Dept: LAB | Facility: CLINIC | Age: 64
End: 2024-10-03
Payer: COMMERCIAL

## 2024-10-03 ENCOUNTER — PATIENT OUTREACH (OUTPATIENT)
Dept: NURSING | Facility: CLINIC | Age: 64
End: 2024-10-03
Payer: COMMERCIAL

## 2024-10-03 DIAGNOSIS — E11.9 TYPE 2 DIABETES MELLITUS WITHOUT COMPLICATION, WITHOUT LONG-TERM CURRENT USE OF INSULIN (H): Primary | ICD-10-CM

## 2024-10-03 DIAGNOSIS — E55.9 VITAMIN D DEFICIENCY: ICD-10-CM

## 2024-10-03 DIAGNOSIS — E78.00 HIGH CHOLESTEROL: ICD-10-CM

## 2024-10-03 DIAGNOSIS — F41.1 GAD (GENERALIZED ANXIETY DISORDER): ICD-10-CM

## 2024-10-03 DIAGNOSIS — Z22.7 LATENT TUBERCULOSIS: ICD-10-CM

## 2024-10-03 DIAGNOSIS — I10 ESSENTIAL HYPERTENSION: ICD-10-CM

## 2024-10-03 LAB
ALBUMIN SERPL BCG-MCNC: 4.4 G/DL (ref 3.5–5.2)
ALP SERPL-CCNC: 122 U/L (ref 40–150)
ALT SERPL W P-5'-P-CCNC: 12 U/L (ref 0–50)
ANION GAP SERPL CALCULATED.3IONS-SCNC: 14 MMOL/L (ref 7–15)
APO A-I SERPL-MCNC: 9 MG/DL
AST SERPL W P-5'-P-CCNC: 32 U/L (ref 0–45)
BILIRUB SERPL-MCNC: 0.2 MG/DL
BUN SERPL-MCNC: 10.8 MG/DL (ref 8–23)
CALCIUM SERPL-MCNC: 9.8 MG/DL (ref 8.8–10.4)
CHLORIDE SERPL-SCNC: 100 MMOL/L (ref 98–107)
CHOLEST SERPL-MCNC: 177 MG/DL
CREAT SERPL-MCNC: 0.69 MG/DL (ref 0.51–0.95)
EGFRCR SERPLBLD CKD-EPI 2021: >90 ML/MIN/1.73M2
ERYTHROCYTE [DISTWIDTH] IN BLOOD BY AUTOMATED COUNT: 12.4 % (ref 10–15)
EST. AVERAGE GLUCOSE BLD GHB EST-MCNC: 140 MG/DL
FASTING STATUS PATIENT QL REPORTED: YES
FASTING STATUS PATIENT QL REPORTED: YES
GLUCOSE SERPL-MCNC: 118 MG/DL (ref 70–99)
HBA1C MFR BLD: 6.5 % (ref 0–5.6)
HCO3 SERPL-SCNC: 23 MMOL/L (ref 22–29)
HCT VFR BLD AUTO: 33.3 % (ref 35–47)
HDLC SERPL-MCNC: 34 MG/DL
HGB BLD-MCNC: 11 G/DL (ref 11.7–15.7)
INSULIN SERPL-ACNC: 6.7 UU/ML (ref 2.6–24.9)
LDLC SERPL CALC-MCNC: 98 MG/DL
MCH RBC QN AUTO: 28.9 PG (ref 26.5–33)
MCHC RBC AUTO-ENTMCNC: 33 G/DL (ref 31.5–36.5)
MCV RBC AUTO: 87 FL (ref 78–100)
NONHDLC SERPL-MCNC: 143 MG/DL
PLATELET # BLD AUTO: 495 10E3/UL (ref 150–450)
POTASSIUM SERPL-SCNC: 4.8 MMOL/L (ref 3.4–5.3)
PROT SERPL-MCNC: 8.5 G/DL (ref 6.4–8.3)
RBC # BLD AUTO: 3.81 10E6/UL (ref 3.8–5.2)
SODIUM SERPL-SCNC: 137 MMOL/L (ref 135–145)
TRIGL SERPL-MCNC: 227 MG/DL
TSH SERPL DL<=0.005 MIU/L-ACNC: 1.25 UIU/ML (ref 0.3–4.2)
VIT D+METAB SERPL-MCNC: 38 NG/ML (ref 20–50)
WBC # BLD AUTO: 8.3 10E3/UL (ref 4–11)

## 2024-10-03 PROCEDURE — 86481 TB AG RESPONSE T-CELL SUSP: CPT

## 2024-10-03 PROCEDURE — 83695 ASSAY OF LIPOPROTEIN(A): CPT

## 2024-10-03 PROCEDURE — 85027 COMPLETE CBC AUTOMATED: CPT

## 2024-10-03 PROCEDURE — 83036 HEMOGLOBIN GLYCOSYLATED A1C: CPT

## 2024-10-03 PROCEDURE — 80053 COMPREHEN METABOLIC PANEL: CPT

## 2024-10-03 PROCEDURE — 36415 COLL VENOUS BLD VENIPUNCTURE: CPT

## 2024-10-03 PROCEDURE — 80061 LIPID PANEL: CPT

## 2024-10-03 PROCEDURE — 83525 ASSAY OF INSULIN: CPT

## 2024-10-03 PROCEDURE — 82172 ASSAY OF APOLIPOPROTEIN: CPT | Mod: 90

## 2024-10-03 PROCEDURE — 99000 SPECIMEN HANDLING OFFICE-LAB: CPT

## 2024-10-03 PROCEDURE — 82306 VITAMIN D 25 HYDROXY: CPT

## 2024-10-03 PROCEDURE — 84443 ASSAY THYROID STIM HORMONE: CPT

## 2024-10-03 ASSESSMENT — ACTIVITIES OF DAILY LIVING (ADL): DEPENDENT_IADLS:: TRANSPORTATION

## 2024-10-03 NOTE — PROGRESS NOTES
Clinic Care Coordination Contact  Clinic Care Coordination Contact  OUTREACH    Referral Information:  Referral Source: PCP    Primary Diagnosis: Other (include Comment box) (coordination of care)    Chief Complaint   Patient presents with    Clinic Care Coordination - Initial        Universal Utilization: see below  Clinic Utilization  Difficulty keeping appointments:: No  Compliance Concerns: No  No-Show Concerns: No  No PCP office visit in Past Year: No  Utilization      No Show Count (past year)  2             ED Visits  0             Hospital Admissions  0                    Current as of: 10/3/2024  2:26 PM                Clinical Concerns:  Current Medical Concerns:    Patient Active Problem List   Diagnosis    Essential hypertension    CRISTINO (generalized anxiety disorder)    Gastritis, chronic    Heme positive stool    High cholesterol    History of Helicobacter pylori infection    Latent tuberculosis    Major depressive disorder, single episode, severe with psychotic features (H)    Prediabetes    Strongyloides stercoralis infection    Major depressive disorder, recurrent, moderate (H)    Tobacco use disorder    Pain in joint, ankle and foot, right    Type 2 diabetes mellitus without complication, without long-term current use of insulin (H)    Other insomnia    Dizziness         Current Behavioral Concerns:     Education Provided to patient: yes   Pain  Pain (GOAL):: No  Health Maintenance Reviewed: Due/Overdue  (AWV, numerous screenings)  Clinical Pathway: None    Medication Management:  Medication review status: Medications reviewed and no changes reported per patient.        Patient manages with assistance of family     Functional Status:  Dependent ADLs:: Independent  Dependent IADLs:: Transportation  Bed or wheelchair confined:: No  Mobility Status: Independent  Fallen 2 or more times in the past year?: No  Any fall with injury in the past year?: No    Living Situation:  Current living arrangement:: I  "live in a private home with family  Type of residence:: Private home - stairs    Lifestyle & Psychosocial Needs:    Social Determinants of Health     Food Insecurity: Not on file   Depression: Not at risk (1/17/2019)    PHQ-2     PHQ-2 Score: 0   Housing Stability: Not on file   Tobacco Use: High Risk (9/26/2024)    Patient History     Smoking Tobacco Use: Some Days     Smokeless Tobacco Use: Unknown     Passive Exposure: Not on file   Financial Resource Strain: Not on file   Alcohol Use: Not on file   Transportation Needs: Not on file   Physical Activity: Not on file   Interpersonal Safety: Not on file   Stress: Not on file   Social Connections: Not on file   Health Literacy: Not on file     Diet:: Regular  Inadequate nutrition (GOAL):: No  Tube Feeding: No  Inadequate activity/exercise (GOAL):: No  Significant changes in sleep pattern (GOAL): No  Transportation means:: Family     Synagogue or spiritual beliefs that impact treatment:: No  Mental health DX:: Yes  Mental health DX how managed:: Medication  Mental health management concern (GOAL):: No  Informal Support system:: Family        64yr F PMH: as listed above.  Referral from PCP to assist with coordinating referrals.  Spoke with patient via language line.  No consent  to communicate.  Encouraged her to sign a consent at her upcoming lab appointment.  She stated an understanding.  Gave her the phone number to schedule her Dexa scan and chest x-ray.  Reviewed other referrals in her chart.  She denied needing to follow up with orthopedics.  She stated since completing outpatient PT that her knee feels \"much better\".  Writer stating that our would mail her resources for an eye exam.  Goals created with assistance of [employer].  Patient Enrolled.         Resources and Interventions:  Current Resources:      Community Resources: None  Supplies Currently Used at Home: None  Equipment Currently Used at Home: none            Advance Care Plan/Directive  Advanced Care " Plans/Directives on file:: No  Discussed with patient/caregiver:: Declined Further Information    Referrals Placed: None         Care Plan:  Care Plan: Annual Wellness Visit       Problem: HP GENERAL PROBLEM       Goal: I will complete my annual wellness visit.       Note:     Barriers: language barrier  Strengths: family support    Patient expressed understanding of goal: yes  Action steps to achieve this goal:  1. I will schedule my annual wellness visit; [phone])  2. I will attend my annual wellness visit.  3. I will contact my Care Management or clinic team if I have barriers to attending my annual wellness visit.                               Care Plan: Appointments/Referrals       Problem: HP GENERAL PROBLEM       Goal: I would like to attend the following appointments/referrals.       Start Date: 10/3/2024    Note:     Barriers: language barrier  Strengths: family support  Patient expressed understanding of goal: yes  Action steps to achieve this goal:  1. I will call to schedule both my Dexa Scan and my chest x-ray.  [phone]  2. I will call to schedule an eye appointment.  I am asking for my Community Healthcare Worker to mail resources of eye doctors covered by my plan.                                Patient/Caregiver understanding: patient stated an understanding    Outreach Frequency: monthly, more frequently as needed  Future Appointments                In 3 weeks Suzanne Thayer APRN CNP [facility]            Plan: DELEGATION-CHW TO MAIL PATIENT EYE EXAM RESOURCES IN THE NEXT 1 WEEK.

## 2024-10-05 LAB
GAMMA INTERFERON BACKGROUND BLD IA-ACNC: 0.04 IU/ML
M TB IFN-G BLD-IMP: POSITIVE
M TB IFN-G CD4+ BCKGRND COR BLD-ACNC: 3.34 IU/ML
MITOGEN IGNF BCKGRD COR BLD-ACNC: 0.34 IU/ML
MITOGEN IGNF BCKGRD COR BLD-ACNC: 0.4 IU/ML
QUANTIFERON MITOGEN: 3.38 IU/ML
QUANTIFERON NIL TUBE: 0.04 IU/ML
QUANTIFERON TB1 TUBE: 0.38 IU/ML
QUANTIFERON TB2 TUBE: 0.44

## 2024-10-06 DIAGNOSIS — Z22.7 LATENT TUBERCULOSIS BY BLOOD TEST: Primary | ICD-10-CM

## 2024-10-06 PROBLEM — E11.9 TYPE 2 DIABETES MELLITUS WITHOUT COMPLICATION, WITHOUT LONG-TERM CURRENT USE OF INSULIN (H): Status: ACTIVE | Noted: 2023-03-09

## 2024-10-06 LAB — APO B100 SERPL-MCNC: 105 MG/DL

## 2024-10-06 NOTE — PROGRESS NOTES
DMT2 on testing; needs medication start and follow up appointment for education on DMT2, medication start,  Latent TB; needs chest x-ray and referral to ID  Needs follow up appointment to discuss.  Suzanne Cummings, APRN, CNP

## 2024-10-07 ENCOUNTER — PATIENT OUTREACH (OUTPATIENT)
Dept: CARE COORDINATION | Facility: CLINIC | Age: 64
End: 2024-10-07
Payer: COMMERCIAL

## 2024-10-07 NOTE — PROGRESS NOTES
Clinic Care Coordination Contact  Community Health Worker Follow Up    Care Gaps:     Health Maintenance Due   Topic Date Due    DEXA  Never done    MICROALBUMIN  Never done    DIABETIC FOOT EXAM  Never done    ADVANCE CARE PLANNING  Never done    DEPRESSION ACTION PLAN  Never done    EYE EXAM  Never done    PHQ-9  Never done    LUNG CANCER SCREENING  Never done    ZOSTER IMMUNIZATION (2 of 2) 01/16/2020    MEDICARE ANNUAL WELLNESS VISIT  06/01/2023    COLORECTAL CANCER SCREENING  01/03/2024       Postponed to next CHW outreach     Care Plan: CHW went to [employer] search a dentists website today 10/7/24 and the website is down for maintenance.     Intervention and Education during outreach: N/A    CHW Delegation received 10/3/24: DELEGATION-CHW TO MAIL PATIENT EYE EXAM RESOURCES IN THE NEXT 1 WEEK.     CHW Plan: CHW will try again 10/8/24 to search for [facility] Dentist in her area to mail to patient.    Kirstin Rodriguez  Community Health Worker  [facility]  Clinic Care Coordination   [facility], [place], [place], [place] and [facility]  Office: [phone]

## 2024-10-07 NOTE — PROGRESS NOTES
New Referral Triage: LTBI    Is the patient a pre-transplant Eval: No    Is the patient Symptomatic (Lungs, GI, Lymph nodes, eyes): No    If yes to lungs:   - Do they currently have a cough:   - Phlegm/ sputum production:   - Fever/ Chills/ Night sweats:   -Difficulty in breathing:   -Unintentional weight loss in last 3 months:   -Duration of symptoms:    Chest imaging available within last month: Ordered, not yet completed. Will ensure It is completed prior to visit date   If yes- Normal or abnormal:    If patient is experiencing symptoms AND Abnormal imaging: Order sputum AFB x3 (8 hours apart).    Message sent to Ambulatory Infection Prevention Pool for TB flag: N/A    Scheduling protocol:  If active TB is ruled out- schedule for routine LTBI visit.    If pre-transplant workup- schedule as urgent with TID provider.    If suspected active TB- Schedule soonest available appointment.

## 2024-10-07 NOTE — LETTER
[facility] CARE COORDINATION  [address] [facility] [address]    October 25, 2024    Doug Davidson  [address] [address]      Dear Doug,    I am a clinic community health worker who works with Suzanne Thayer with the [facility]. Below is information regarding Dental providers near you that accept Medical Assistance:    [facility]  Dental  Accepting new patients  [address]  [place] [address]  1.91 miles away  [phone]    [facility]  Dental  Accepting new patients  [address] [place] [address]  3.92 miles away  [phone]    [facility]  Dental  Accepting new patients  [address] [place] [address]  6.62 miles away  [phone]    [facility]  Specialty Clinic  Accepting new patients  [address] [place] [address]  7 miles away  [phone]    [facility]  Dental  Accepting new patients  [address]  [place] [address]  7.09 miles away  [phone]          Please feel free to contact me with any questions or concerns.           Sincerely,      Kirstin Rodriguez  Community Health Worker  [facility] Care Coordination   [place], [place], [place], [place] and [facility]  Office: [phone]

## 2024-10-13 ENCOUNTER — APPOINTMENT (OUTPATIENT)
Dept: CT IMAGING | Facility: HOSPITAL | Age: 64
End: 2024-10-13
Attending: STUDENT IN AN ORGANIZED HEALTH CARE EDUCATION/TRAINING PROGRAM
Payer: COMMERCIAL

## 2024-10-13 ENCOUNTER — APPOINTMENT (OUTPATIENT)
Dept: CARDIOLOGY | Facility: HOSPITAL | Age: 64
End: 2024-10-13
Attending: INTERNAL MEDICINE
Payer: COMMERCIAL

## 2024-10-13 ENCOUNTER — HOSPITAL ENCOUNTER (OUTPATIENT)
Facility: HOSPITAL | Age: 64
Setting detail: OBSERVATION
Discharge: HOME | End: 2024-10-15
Attending: STUDENT IN AN ORGANIZED HEALTH CARE EDUCATION/TRAINING PROGRAM | Admitting: INTERNAL MEDICINE
Payer: COMMERCIAL

## 2024-10-13 DIAGNOSIS — R42 DIZZINESS: ICD-10-CM

## 2024-10-13 DIAGNOSIS — R55 SYNCOPE, UNSPECIFIED SYNCOPE TYPE: ICD-10-CM

## 2024-10-13 DIAGNOSIS — E87.1 HYPONATREMIA: ICD-10-CM

## 2024-10-13 DIAGNOSIS — H81.13 BENIGN PAROXYSMAL POSITIONAL VERTIGO DUE TO BILATERAL VESTIBULAR DISORDER: Primary | ICD-10-CM

## 2024-10-13 LAB
ALBUMIN SERPL BCG-MCNC: 4.4 G/DL (ref 3.5–5.2)
ALBUMIN UR-MCNC: NEGATIVE MG/DL
ALP SERPL-CCNC: 156 U/L (ref 40–150)
ALT SERPL W P-5'-P-CCNC: 11 U/L (ref 0–50)
ANION GAP SERPL CALCULATED.3IONS-SCNC: 10 MMOL/L (ref 7–15)
ANION GAP SERPL CALCULATED.3IONS-SCNC: 12 MMOL/L (ref 7–15)
ANION GAP SERPL CALCULATED.3IONS-SCNC: 13 MMOL/L (ref 7–15)
ANION GAP SERPL CALCULATED.3IONS-SCNC: 15 MMOL/L (ref 7–15)
APPEARANCE UR: CLEAR
AST SERPL W P-5'-P-CCNC: 27 U/L (ref 0–45)
BASOPHILS # BLD AUTO: 0 10E3/UL (ref 0–0.2)
BASOPHILS # BLD AUTO: 0.1 10E3/UL (ref 0–0.2)
BASOPHILS NFR BLD AUTO: 1 %
BASOPHILS NFR BLD AUTO: 1 %
BILIRUB SERPL-MCNC: 0.3 MG/DL
BILIRUB UR QL STRIP: NEGATIVE
BUN SERPL-MCNC: 6.3 MG/DL (ref 8–23)
BUN SERPL-MCNC: 6.7 MG/DL (ref 8–23)
BUN SERPL-MCNC: 7.2 MG/DL (ref 8–23)
BUN SERPL-MCNC: 7.7 MG/DL (ref 8–23)
CALCIUM SERPL-MCNC: 8.9 MG/DL (ref 8.8–10.4)
CALCIUM SERPL-MCNC: 9 MG/DL (ref 8.8–10.4)
CALCIUM SERPL-MCNC: 9.4 MG/DL (ref 8.8–10.4)
CALCIUM SERPL-MCNC: 9.7 MG/DL (ref 8.8–10.4)
CHLORIDE SERPL-SCNC: 88 MMOL/L (ref 98–107)
CHLORIDE SERPL-SCNC: 89 MMOL/L (ref 98–107)
CHLORIDE SERPL-SCNC: 95 MMOL/L (ref 98–107)
CHLORIDE SERPL-SCNC: 97 MMOL/L (ref 98–107)
COLOR UR AUTO: COLORLESS
CORTIS SERPL-MCNC: 13.2 UG/DL
CREAT SERPL-MCNC: 0.54 MG/DL (ref 0.51–0.95)
CREAT SERPL-MCNC: 0.57 MG/DL (ref 0.51–0.95)
CREAT SERPL-MCNC: 0.58 MG/DL (ref 0.51–0.95)
CREAT SERPL-MCNC: 0.6 MG/DL (ref 0.51–0.95)
CRP SERPL-MCNC: 10.3 MG/L
D DIMER PPP FEU-MCNC: 1.11 UG/ML FEU (ref 0–0.5)
EGFRCR SERPLBLD CKD-EPI 2021: >90 ML/MIN/1.73M2
EOSINOPHIL # BLD AUTO: 0.1 10E3/UL (ref 0–0.7)
EOSINOPHIL # BLD AUTO: 0.1 10E3/UL (ref 0–0.7)
EOSINOPHIL NFR BLD AUTO: 2 %
EOSINOPHIL NFR BLD AUTO: 2 %
ERYTHROCYTE [DISTWIDTH] IN BLOOD BY AUTOMATED COUNT: 12.1 % (ref 10–15)
ERYTHROCYTE [DISTWIDTH] IN BLOOD BY AUTOMATED COUNT: 12.2 % (ref 10–15)
GLUCOSE BLDC GLUCOMTR-MCNC: 124 MG/DL (ref 70–99)
GLUCOSE BLDC GLUCOMTR-MCNC: 128 MG/DL (ref 70–99)
GLUCOSE BLDC GLUCOMTR-MCNC: 129 MG/DL (ref 70–99)
GLUCOSE BLDC GLUCOMTR-MCNC: 129 MG/DL (ref 70–99)
GLUCOSE SERPL-MCNC: 116 MG/DL (ref 70–99)
GLUCOSE SERPL-MCNC: 148 MG/DL (ref 70–99)
GLUCOSE SERPL-MCNC: 149 MG/DL (ref 70–99)
GLUCOSE SERPL-MCNC: 162 MG/DL (ref 70–99)
GLUCOSE UR STRIP-MCNC: NEGATIVE MG/DL
HCO3 SERPL-SCNC: 21 MMOL/L (ref 22–29)
HCO3 SERPL-SCNC: 22 MMOL/L (ref 22–29)
HCO3 SERPL-SCNC: 23 MMOL/L (ref 22–29)
HCO3 SERPL-SCNC: 23 MMOL/L (ref 22–29)
HCT VFR BLD AUTO: 33.7 % (ref 35–47)
HCT VFR BLD AUTO: 34.6 % (ref 35–47)
HGB BLD-MCNC: 11.6 G/DL (ref 11.7–15.7)
HGB BLD-MCNC: 12 G/DL (ref 11.7–15.7)
HGB UR QL STRIP: NEGATIVE
IMM GRANULOCYTES # BLD: 0 10E3/UL
IMM GRANULOCYTES # BLD: 0 10E3/UL
IMM GRANULOCYTES NFR BLD: 0 %
IMM GRANULOCYTES NFR BLD: 0 %
KETONES UR STRIP-MCNC: NEGATIVE MG/DL
LEUKOCYTE ESTERASE UR QL STRIP: NEGATIVE
LVEF ECHO: NORMAL
LYMPHOCYTES # BLD AUTO: 2.3 10E3/UL (ref 0.8–5.3)
LYMPHOCYTES # BLD AUTO: 2.9 10E3/UL (ref 0.8–5.3)
LYMPHOCYTES NFR BLD AUTO: 37 %
LYMPHOCYTES NFR BLD AUTO: 40 %
MAGNESIUM SERPL-MCNC: 1.8 MG/DL (ref 1.7–2.3)
MCH RBC QN AUTO: 28.9 PG (ref 26.5–33)
MCH RBC QN AUTO: 29.1 PG (ref 26.5–33)
MCHC RBC AUTO-ENTMCNC: 34.4 G/DL (ref 31.5–36.5)
MCHC RBC AUTO-ENTMCNC: 34.7 G/DL (ref 31.5–36.5)
MCV RBC AUTO: 84 FL (ref 78–100)
MCV RBC AUTO: 84 FL (ref 78–100)
MONOCYTES # BLD AUTO: 0.5 10E3/UL (ref 0–1.3)
MONOCYTES # BLD AUTO: 0.7 10E3/UL (ref 0–1.3)
MONOCYTES NFR BLD AUTO: 10 %
MONOCYTES NFR BLD AUTO: 7 %
MUCOUS THREADS #/AREA URNS LPF: PRESENT /LPF
NEUTROPHILS # BLD AUTO: 3.1 10E3/UL (ref 1.6–8.3)
NEUTROPHILS # BLD AUTO: 3.6 10E3/UL (ref 1.6–8.3)
NEUTROPHILS NFR BLD AUTO: 50 %
NEUTROPHILS NFR BLD AUTO: 50 %
NITRATE UR QL: NEGATIVE
NONINV COLON CA DNA+OCC BLD SCRN STL QL: NEGATIVE
NRBC # BLD AUTO: 0 10E3/UL
NRBC # BLD AUTO: 0 10E3/UL
NRBC BLD AUTO-RTO: 0 /100
NRBC BLD AUTO-RTO: 0 /100
OSMOLALITY SERPL: 267 MMOL/KG (ref 280–301)
OSMOLALITY SERPL: 276 MMOL/KG (ref 280–301)
OSMOLALITY UR: 149 MMOL/KG (ref 100–1200)
PH UR STRIP: 6.5 [PH] (ref 5–7)
PLATELET # BLD AUTO: 483 10E3/UL (ref 150–450)
PLATELET # BLD AUTO: 547 10E3/UL (ref 150–450)
POTASSIUM SERPL-SCNC: 3.6 MMOL/L (ref 3.4–5.3)
POTASSIUM SERPL-SCNC: 3.8 MMOL/L (ref 3.4–5.3)
POTASSIUM SERPL-SCNC: 3.8 MMOL/L (ref 3.4–5.3)
POTASSIUM SERPL-SCNC: 3.9 MMOL/L (ref 3.4–5.3)
PROCALCITONIN SERPL IA-MCNC: <0.02 NG/ML
PROT SERPL-MCNC: 8.7 G/DL (ref 6.4–8.3)
RBC # BLD AUTO: 4.02 10E6/UL (ref 3.8–5.2)
RBC # BLD AUTO: 4.13 10E6/UL (ref 3.8–5.2)
RBC URINE: <1 /HPF
SODIUM SERPL-SCNC: 123 MMOL/L (ref 135–145)
SODIUM SERPL-SCNC: 125 MMOL/L (ref 135–145)
SODIUM SERPL-SCNC: 128 MMOL/L (ref 135–145)
SODIUM SERPL-SCNC: 132 MMOL/L (ref 135–145)
SODIUM UR-SCNC: 29 MMOL/L
SP GR UR STRIP: 1.02 (ref 1–1.03)
T4 FREE SERPL-MCNC: 1.05 NG/DL (ref 0.9–1.7)
TROPONIN T SERPL HS-MCNC: <6 NG/L
TROPONIN T SERPL HS-MCNC: <6 NG/L
TSH SERPL DL<=0.005 MIU/L-ACNC: 4.22 UIU/ML (ref 0.3–4.2)
UROBILINOGEN UR STRIP-MCNC: <2 MG/DL
WBC # BLD AUTO: 6.3 10E3/UL (ref 4–11)
WBC # BLD AUTO: 7.1 10E3/UL (ref 4–11)
WBC URINE: 1 /HPF

## 2024-10-13 PROCEDURE — 71250 CT THORAX DX C-: CPT

## 2024-10-13 PROCEDURE — 84145 PROCALCITONIN (PCT): CPT | Performed by: INTERNAL MEDICINE

## 2024-10-13 PROCEDURE — 258N000003 HC RX IP 258 OP 636: Performed by: STUDENT IN AN ORGANIZED HEALTH CARE EDUCATION/TRAINING PROGRAM

## 2024-10-13 PROCEDURE — 83930 ASSAY OF BLOOD OSMOLALITY: CPT | Performed by: INTERNAL MEDICINE

## 2024-10-13 PROCEDURE — 250N000013 HC RX MED GY IP 250 OP 250 PS 637: Performed by: INTERNAL MEDICINE

## 2024-10-13 PROCEDURE — 99285 EMERGENCY DEPT VISIT HI MDM: CPT | Mod: 25

## 2024-10-13 PROCEDURE — 99222 1ST HOSP IP/OBS MODERATE 55: CPT | Performed by: STUDENT IN AN ORGANIZED HEALTH CARE EDUCATION/TRAINING PROGRAM

## 2024-10-13 PROCEDURE — 82533 TOTAL CORTISOL: CPT | Performed by: INTERNAL MEDICINE

## 2024-10-13 PROCEDURE — 36415 COLL VENOUS BLD VENIPUNCTURE: CPT | Performed by: STUDENT IN AN ORGANIZED HEALTH CARE EDUCATION/TRAINING PROGRAM

## 2024-10-13 PROCEDURE — 80048 BASIC METABOLIC PNL TOTAL CA: CPT | Performed by: INTERNAL MEDICINE

## 2024-10-13 PROCEDURE — 85025 COMPLETE CBC W/AUTO DIFF WBC: CPT | Performed by: INTERNAL MEDICINE

## 2024-10-13 PROCEDURE — 99254 IP/OBS CNSLTJ NEW/EST MOD 60: CPT | Performed by: INTERNAL MEDICINE

## 2024-10-13 PROCEDURE — 84300 ASSAY OF URINE SODIUM: CPT | Performed by: STUDENT IN AN ORGANIZED HEALTH CARE EDUCATION/TRAINING PROGRAM

## 2024-10-13 PROCEDURE — 120N000001 HC R&B MED SURG/OB

## 2024-10-13 PROCEDURE — 84484 ASSAY OF TROPONIN QUANT: CPT | Performed by: INTERNAL MEDICINE

## 2024-10-13 PROCEDURE — 250N000013 HC RX MED GY IP 250 OP 250 PS 637: Performed by: STUDENT IN AN ORGANIZED HEALTH CARE EDUCATION/TRAINING PROGRAM

## 2024-10-13 PROCEDURE — 84443 ASSAY THYROID STIM HORMONE: CPT | Performed by: INTERNAL MEDICINE

## 2024-10-13 PROCEDURE — 250N000011 HC RX IP 250 OP 636: Performed by: INTERNAL MEDICINE

## 2024-10-13 PROCEDURE — 82040 ASSAY OF SERUM ALBUMIN: CPT | Performed by: INTERNAL MEDICINE

## 2024-10-13 PROCEDURE — 83735 ASSAY OF MAGNESIUM: CPT | Performed by: INTERNAL MEDICINE

## 2024-10-13 PROCEDURE — 81001 URINALYSIS AUTO W/SCOPE: CPT | Performed by: STUDENT IN AN ORGANIZED HEALTH CARE EDUCATION/TRAINING PROGRAM

## 2024-10-13 PROCEDURE — 93306 TTE W/DOPPLER COMPLETE: CPT

## 2024-10-13 PROCEDURE — 84439 ASSAY OF FREE THYROXINE: CPT | Performed by: INTERNAL MEDICINE

## 2024-10-13 PROCEDURE — 96376 TX/PRO/DX INJ SAME DRUG ADON: CPT

## 2024-10-13 PROCEDURE — 86140 C-REACTIVE PROTEIN: CPT | Performed by: INTERNAL MEDICINE

## 2024-10-13 PROCEDURE — 36415 COLL VENOUS BLD VENIPUNCTURE: CPT | Performed by: INTERNAL MEDICINE

## 2024-10-13 PROCEDURE — 99207 PR NO BILLABLE SERVICE THIS VISIT: CPT | Performed by: INTERNAL MEDICINE

## 2024-10-13 PROCEDURE — 93306 TTE W/DOPPLER COMPLETE: CPT | Mod: 26 | Performed by: INTERNAL MEDICINE

## 2024-10-13 PROCEDURE — 250N000011 HC RX IP 250 OP 636: Performed by: STUDENT IN AN ORGANIZED HEALTH CARE EDUCATION/TRAINING PROGRAM

## 2024-10-13 PROCEDURE — 70496 CT ANGIOGRAPHY HEAD: CPT

## 2024-10-13 PROCEDURE — 85025 COMPLETE CBC W/AUTO DIFF WBC: CPT | Performed by: STUDENT IN AN ORGANIZED HEALTH CARE EDUCATION/TRAINING PROGRAM

## 2024-10-13 PROCEDURE — 93005 ELECTROCARDIOGRAM TRACING: CPT | Performed by: STUDENT IN AN ORGANIZED HEALTH CARE EDUCATION/TRAINING PROGRAM

## 2024-10-13 PROCEDURE — 83935 ASSAY OF URINE OSMOLALITY: CPT | Performed by: INTERNAL MEDICINE

## 2024-10-13 PROCEDURE — 96361 HYDRATE IV INFUSION ADD-ON: CPT

## 2024-10-13 PROCEDURE — 80048 BASIC METABOLIC PNL TOTAL CA: CPT | Performed by: STUDENT IN AN ORGANIZED HEALTH CARE EDUCATION/TRAINING PROGRAM

## 2024-10-13 PROCEDURE — 99223 1ST HOSP IP/OBS HIGH 75: CPT | Performed by: INTERNAL MEDICINE

## 2024-10-13 PROCEDURE — 82962 GLUCOSE BLOOD TEST: CPT

## 2024-10-13 PROCEDURE — 83930 ASSAY OF BLOOD OSMOLALITY: CPT | Performed by: STUDENT IN AN ORGANIZED HEALTH CARE EDUCATION/TRAINING PROGRAM

## 2024-10-13 PROCEDURE — 85379 FIBRIN DEGRADATION QUANT: CPT | Performed by: INTERNAL MEDICINE

## 2024-10-13 PROCEDURE — 70498 CT ANGIOGRAPHY NECK: CPT

## 2024-10-13 PROCEDURE — 96374 THER/PROPH/DIAG INJ IV PUSH: CPT | Mod: 59

## 2024-10-13 RX ORDER — MECLIZINE HCL 12.5 MG 12.5 MG/1
50 TABLET ORAL ONCE
Status: COMPLETED | OUTPATIENT
Start: 2024-10-13 | End: 2024-10-13

## 2024-10-13 RX ORDER — TRAZODONE HYDROCHLORIDE 50 MG/1
50 TABLET, FILM COATED ORAL AT BEDTIME
Status: DISCONTINUED | OUTPATIENT
Start: 2024-10-13 | End: 2024-10-13

## 2024-10-13 RX ORDER — DEXTROSE MONOHYDRATE 25 G/50ML
25-50 INJECTION, SOLUTION INTRAVENOUS
Status: DISCONTINUED | OUTPATIENT
Start: 2024-10-13 | End: 2024-10-15 | Stop reason: HOSPADM

## 2024-10-13 RX ORDER — AMOXICILLIN 250 MG
1 CAPSULE ORAL 2 TIMES DAILY PRN
Status: DISCONTINUED | OUTPATIENT
Start: 2024-10-13 | End: 2024-10-15 | Stop reason: HOSPADM

## 2024-10-13 RX ORDER — NICOTINE POLACRILEX 4 MG
15-30 LOZENGE BUCCAL
Status: DISCONTINUED | OUTPATIENT
Start: 2024-10-13 | End: 2024-10-15 | Stop reason: HOSPADM

## 2024-10-13 RX ORDER — QUETIAPINE FUMARATE 25 MG/1
25 TABLET, FILM COATED ORAL AT BEDTIME
Status: DISCONTINUED | OUTPATIENT
Start: 2024-10-13 | End: 2024-10-15 | Stop reason: HOSPADM

## 2024-10-13 RX ORDER — MECLIZINE HCL 12.5 MG 12.5 MG/1
25 TABLET ORAL ONCE
Status: DISCONTINUED | OUTPATIENT
Start: 2024-10-13 | End: 2024-10-13

## 2024-10-13 RX ORDER — GABAPENTIN 100 MG/1
100 CAPSULE ORAL 2 TIMES DAILY
Status: DISCONTINUED | OUTPATIENT
Start: 2024-10-13 | End: 2024-10-15 | Stop reason: HOSPADM

## 2024-10-13 RX ORDER — ROSUVASTATIN CALCIUM 10 MG/1
10 TABLET, COATED ORAL DAILY
Status: DISCONTINUED | OUTPATIENT
Start: 2024-10-13 | End: 2024-10-15 | Stop reason: HOSPADM

## 2024-10-13 RX ORDER — ONDANSETRON 2 MG/ML
4 INJECTION INTRAMUSCULAR; INTRAVENOUS EVERY 6 HOURS PRN
Status: DISCONTINUED | OUTPATIENT
Start: 2024-10-13 | End: 2024-10-15 | Stop reason: HOSPADM

## 2024-10-13 RX ORDER — CALCIUM CARBONATE 500 MG/1
1000 TABLET, CHEWABLE ORAL 4 TIMES DAILY PRN
Status: DISCONTINUED | OUTPATIENT
Start: 2024-10-13 | End: 2024-10-15 | Stop reason: HOSPADM

## 2024-10-13 RX ORDER — AMOXICILLIN 250 MG
2 CAPSULE ORAL 2 TIMES DAILY PRN
Status: DISCONTINUED | OUTPATIENT
Start: 2024-10-13 | End: 2024-10-15 | Stop reason: HOSPADM

## 2024-10-13 RX ORDER — FLUTICASONE PROPIONATE 50 MCG
1 SPRAY, SUSPENSION (ML) NASAL DAILY
Status: DISCONTINUED | OUTPATIENT
Start: 2024-10-13 | End: 2024-10-15 | Stop reason: HOSPADM

## 2024-10-13 RX ORDER — IOPAMIDOL 755 MG/ML
75 INJECTION, SOLUTION INTRAVASCULAR ONCE
Status: COMPLETED | OUTPATIENT
Start: 2024-10-13 | End: 2024-10-13

## 2024-10-13 RX ORDER — ONDANSETRON 4 MG/1
4 TABLET, ORALLY DISINTEGRATING ORAL EVERY 6 HOURS PRN
Status: DISCONTINUED | OUTPATIENT
Start: 2024-10-13 | End: 2024-10-15 | Stop reason: HOSPADM

## 2024-10-13 RX ORDER — LIDOCAINE 40 MG/G
CREAM TOPICAL
Status: DISCONTINUED | OUTPATIENT
Start: 2024-10-13 | End: 2024-10-15 | Stop reason: HOSPADM

## 2024-10-13 RX ORDER — AMLODIPINE BESYLATE 5 MG/1
5 TABLET ORAL DAILY
Status: DISCONTINUED | OUTPATIENT
Start: 2024-10-13 | End: 2024-10-15 | Stop reason: HOSPADM

## 2024-10-13 RX ORDER — PANTOPRAZOLE SODIUM 40 MG/1
40 TABLET, DELAYED RELEASE ORAL
Status: DISCONTINUED | OUTPATIENT
Start: 2024-10-13 | End: 2024-10-15 | Stop reason: HOSPADM

## 2024-10-13 RX ADMIN — FAMOTIDINE 20 MG: 10 INJECTION, SOLUTION INTRAVENOUS at 20:59

## 2024-10-13 RX ADMIN — ROSUVASTATIN 10 MG: 10 TABLET, FILM COATED ORAL at 07:58

## 2024-10-13 RX ADMIN — SODIUM CHLORIDE 1000 ML: 9 INJECTION, SOLUTION INTRAVENOUS at 06:25

## 2024-10-13 RX ADMIN — MECLIZINE HYDROCHLORIDE 50 MG: 12.5 TABLET ORAL at 02:44

## 2024-10-13 RX ADMIN — GABAPENTIN 100 MG: 100 CAPSULE ORAL at 07:38

## 2024-10-13 RX ADMIN — AMLODIPINE BESYLATE 5 MG: 5 TABLET ORAL at 07:38

## 2024-10-13 RX ADMIN — IOPAMIDOL 75 ML: 755 INJECTION, SOLUTION INTRAVENOUS at 03:57

## 2024-10-13 RX ADMIN — PANTOPRAZOLE SODIUM 40 MG: 40 TABLET, DELAYED RELEASE ORAL at 07:38

## 2024-10-13 RX ADMIN — GABAPENTIN 100 MG: 100 CAPSULE ORAL at 21:01

## 2024-10-13 RX ADMIN — SERTRALINE HYDROCHLORIDE 100 MG: 100 TABLET ORAL at 07:41

## 2024-10-13 RX ADMIN — FLUTICASONE PROPIONATE 1 SPRAY: 50 SPRAY, METERED NASAL at 07:40

## 2024-10-13 RX ADMIN — SERTRALINE HYDROCHLORIDE 50 MG: 50 TABLET ORAL at 07:58

## 2024-10-13 RX ADMIN — FAMOTIDINE 20 MG: 10 INJECTION, SOLUTION INTRAVENOUS at 07:38

## 2024-10-13 ASSESSMENT — ACTIVITIES OF DAILY LIVING (ADL)
ADLS_ACUITY_SCORE: 35
ADLS_ACUITY_SCORE: 22
ADLS_ACUITY_SCORE: 23
ADLS_ACUITY_SCORE: 35
ADLS_ACUITY_SCORE: 23
ADLS_ACUITY_SCORE: 33
ADLS_ACUITY_SCORE: 35
ADLS_ACUITY_SCORE: 33
ADLS_ACUITY_SCORE: 35
ADLS_ACUITY_SCORE: 33
ADLS_ACUITY_SCORE: 35
ADLS_ACUITY_SCORE: 33
ADLS_ACUITY_SCORE: 35

## 2024-10-13 NOTE — CONSULTS
PULMONARY / CRITICAL CARE CONSULT NOTE    Date / Time of Admission:  10/13/2024  2:14 AM    Assessment:     Doug Davidson is a 64 year old female with history of HTN, DM, latent TB, GERD, depression.   [place]. Father was treated for pulmonary tuberculosis.   Presents to ED for evaluation of dizziness/vertigo sensation, and generalized weakness. Reports fall.   Denies chest pain, shortness of breath, cough, wheezes, abdominal pain, N/V or diarrhea.   Lab showed hyponatremia, Na 123, mild elevated CRP, normal WBC and diff, Hb 12.   Head CT scan showed no acute intracranial process. Chest CT scan showed calcified small nodules. No lung infiltrates.   Started on IV fluids NS with improvement of sodium level.   Pulmonary service consulted for small calcified lung nodules.     Small calcified lung nodules , calcified mediastinal lymph nodes  No further work up.    Latent TB  Chest CT scan showed no lung infiltrates. Patient denies respiratory symptoms.   Discontinue airborne isolation.   Father was treated for pulmonary tuberculosis.   Patient should be given INH for prophylaxis.   Hyponatremia   Could be related to HCTZ  Work up by primary service. After IV fluids Na 123 to 128.   HTN  DM  GERD  Advance Directives:  Full code    Plan:   Discontinue airborne isolation  No further work up of calcified lung nodules   Titrate BP meds  Glucose level monitoring   DVT prophylaxis SCDs    Pulmonary service will sign off  Please contact me if you have any questions.    Cheng Simons  Pulmonary / Critical Care  10/13/2024  12:12 PM      Reason for consult : small calcified lung nodules.    HPI:  Doug Davidson is a 64 year old female with history of HTN, DM, latent TB, GERD, depression.   [place] on [place]. Father was treated for pulmonary tuberculosis.   Presents to ED for evaluation of dizziness/vertigo sensation, and generalized weakness. Reports fall.   Denies  chest pain, shortness of breath, cough, wheezes, abdominal pain, N/V or diarrhea.   Lab showed hyponatremia, Na 123, mild elevated CRP, normal WBC and diff, Hb 12.   Head CT scan showed no acute intracranial process. Chest CT scan showed calcified small nodules. No lung infiltrates.   Started on IV fluids NS with improvement of sodium level.   Pulmonary service consulted for small calcified lung nodules.       Past Medical History:   Diagnosis Date    Tobacco use disorder 02/01/2023    Formatting of this note might be different from the original.   2 cig per day       Allergies: Patient has no known allergies.     MEDS:  Current Facility-Administered Medications   Medication Dose Route Frequency Provider Last Rate Last Admin    amLODIPine (NORVASC) tablet 5 mg  5 mg Oral Daily Monalisa Bustos MD   5 mg at 10/13/24 0738    calcium carbonate (TUMS) chewable tablet 1,000 mg  1,000 mg Oral 4x Daily PRN Monalisa Bustos MD        glucose gel 15-30 g  15-30 g Oral Q15 Min PRN Alexx Bridges MD        Or    dextrose 50 % injection 25-50 mL  25-50 mL Intravenous Q15 Min PRN Alexx Bridges MD        Or    glucagon injection 1 mg  1 mg Subcutaneous Q15 Min PRN Alexx Bridges MD        famotidine (PEPCID) injection 20 mg  20 mg Intravenous Q12H Monalisa Bustos MD   20 mg at 10/13/24 0738    fluticasone (FLONASE) 50 MCG/ACT spray 1 spray  1 spray Both Nostrils Daily Monalisa Bustos MD   1 spray at 10/13/24 0740    gabapentin (NEURONTIN) capsule 100 mg  100 mg Oral BID Monalisa Bustos MD   100 mg at 10/13/24 0738    insulin aspart (NovoLOG) injection (RAPID ACTING)  1-7 Units Subcutaneous TID AC Alexx Bridges MD        insulin aspart (NovoLOG) injection (RAPID ACTING)  1-5 Units Subcutaneous At Bedtime Alexx Bridges MD        lidocaine (LMX4) cream   Topical Q1H PRN Monalisa Bustos MD        lidocaine 1 % 0.1-1 mL  0.1-1 mL Other Q1H PRN Monalisa Bustos MD        ondansetron (ZOFRAN ODT) ODT tab 4 mg  4 mg Oral Q6H PRN  Monalisa Bustos MD        Or    ondansetron (ZOFRAN) injection 4 mg  4 mg Intravenous Q6H PRN Monalisa Bustos MD        pantoprazole (PROTONIX) EC tablet 40 mg  40 mg Oral QAM AC Monalisa Bustos MD   40 mg at 10/13/24 0738    QUEtiapine (SEROquel) half-tab 25 mg  25 mg Oral At Bedtime Monalisa Bustos MD        rosuvastatin (CRESTOR) tablet 10 mg  10 mg Oral Daily Monalisa Bustos MD   10 mg at 10/13/24 0758    senna-docusate (SENOKOT-S/PERICOLACE) 8.6-50 MG per tablet 1 tablet  1 tablet Oral BID PRN Monalisa Bustos MD        Or    senna-docusate (SENOKOT-S/PERICOLACE) 8.6-50 MG per tablet 2 tablet  2 tablet Oral BID PRN Monalisa Bustos MD        sertraline (ZOLOFT) tablet 100 mg  100 mg Oral Daily Monalisa Bustos MD   100 mg at 10/13/24 0741    sertraline (ZOLOFT) tablet 50 mg  50 mg Oral Daily Monalisa Bustos MD   50 mg at 10/13/24 0758    sodium chloride (PF) 0.9% PF flush 3 mL  3 mL Intracatheter Q8H Monalisa Bustos MD        sodium chloride (PF) 0.9% PF flush 3 mL  3 mL Intracatheter q1 min prn Monalisa Bustos MD        traZODone (DESYREL) half-tab 25 mg  25 mg Oral At Bedtime Monalisa Bustos MD         Current Outpatient Medications   Medication Sig Dispense Refill    amLODIPine (NORVASC) 5 MG tablet Take 5 mg by mouth daily.      ammonium lactate (AMLACTIN) 12 % external cream Apply liberally to dry skin daily.      Cholecalciferol (VITAMIN D) 50 MCG (2000 UT) CAPS       fluticasone (FLONASE) 50 MCG/ACT nasal spray 2 sprays.      gabapentin (NEURONTIN) 100 MG capsule Take 100 mg by mouth.      hydrochlorothiazide (MICROZIDE) 12.5 MG capsule Take 12.5 mg by mouth.      hydrocortisone (CORTAID) 1 % external cream Apply topically 2 times daily as needed for rash or itching.      meclizine (ANTIVERT) 25 MG tablet Take 1 tablet (25 mg) by mouth 3 times daily as needed for dizziness. 30 tablet 0    omeprazole (PRILOSEC) 20 MG DR capsule Take 20 mg by mouth.      QUEtiapine (SEROQUEL) 50 MG tablet Take 1 tablet by  mouth at bedtime.      rosuvastatin (CRESTOR) 10 MG tablet Take 1 tablet (10 mg) by mouth daily. 30 tablet 0    SENEXON-S 8.6-50 MG tablet Take 1 tablet by mouth 2 times daily      sertraline (ZOLOFT) 100 MG tablet Take 1 tablet (100 mg) by mouth daily. 30 tablet 0    sertraline (ZOLOFT) 50 MG tablet Take 1 tablet (50 mg) by mouth daily. 30 tablet 0    traZODone (DESYREL) 50 MG tablet Take 1 tablet (50 mg) by mouth at bedtime. 30 tablet 0     Social History     Socioeconomic History    Marital status:      Spouse name: Not on file    Number of children: Not on file    Years of education: Not on file    Highest education level: Not on file   Occupational History    Not on file   Tobacco Use    Smoking status: Some Days    Smokeless tobacco: Not on file   Vaping Use    Vaping status: Former   Substance and Sexual Activity    Alcohol use: Not on file    Drug use: Not on file    Sexual activity: Not on file   Other Topics Concern    Not on file   Social History Narrative    Not on file     Social Determinants of Health     Financial Resource Strain: Not on file   Food Insecurity: Not on file   Transportation Needs: Not on file   Physical Activity: Not on file   Stress: Not on file   Social Connections: Not on file   Interpersonal Safety: Not on file   Housing Stability: Not on file     Family History   Problem Relation Age of Onset    Colon Cancer No family hx of     Breast Cancer No family hx of      ROS  - Twelve point review of systems were discussed with patient, positive finding in HPI      Objective:   VITALS:  /70   Pulse 77   Temp 97.8  F (36.6  C) (Oral)   Resp 17   SpO2 97%   VENT:  Resp: 17  EXAM:   Gen: awake, mild distress due to generalized weakness   HEENT: pink conjunctiva, moist mucosa, Mallampati II/IV  Neck: no thyromegaly, masses or JVD  Lungs: clear  CV: regular, no murmurs or gallops appreciated  Abdomen: soft, NT, BS wnl  Ext: no edema  Neuro: CN II-XII intact, non focal        Data Review:  Recent Labs   Lab 10/13/24  1006 10/13/24  0945 10/13/24  0630 10/13/24  0252   * 129* 149* 162*      10/13/24 10:06   Sodium 128 (L)   Potassium 3.8   Chloride 95 (L)   Carbon Dioxide (CO2) 23   Urea Nitrogen 6.3 (L)   Creatinine 0.54   GFR Estimate >90   Calcium 9.0   Anion Gap 10      10/13/24 06:30   WBC 7.1   Hemoglobin 12.0   Hematocrit 34.6 (L)   Platelet Count 547 (H)   RBC Count 4.13   MCV 84   MCH 29.1   MCHC 34.7   RDW 12.2   % Neutrophils 50   % Lymphocytes 40   % Monocytes 7   % Eosinophils 2   % Basophils 1     CT CHEST W/O CONTRAST  LOCATION: [facility]  DATE: 10/13/2024  INDICATION: Weakness. Evaluate for tuberculosis.  COMPARISON: None.  FINDINGS:   LUNGS AND PLEURA: Mild atelectasis is seen in the bilateral lower lobes. Scattered 2 mm calcified granulomas bilaterally. No confluent consolidation, pleural effusion, pneumothorax or architectural distortion.  MEDIASTINUM/AXILLAE: No lymphadenopathy. No thoracic aortic aneurysm. Heart size is normal without pericardial effusion.  CORONARY ARTERY CALCIFICATION: None.  UPPER ABDOMEN: No acute findings.  MUSCULOSKELETAL: No significant osseous abnormality.                                        IMPRESSION:    1.  Scattered bilateral calcified granulomas compatible with old granulomatous disease. No significant intrathoracic abnormality, including pulmonary findings of tuberculosis.    By:  Cheng Simons MD, 10/13/2024  12:12 PM    Primary Care Physician:  Suzanne Thayer

## 2024-10-13 NOTE — MEDICATION SCRIBE - ADMISSION MEDICATION HISTORY
Medication Scribe Admission Medication History    Admission medication history is complete. The information provided in this note is only as accurate as the sources available at the time of the update.    Information Source(s): Patient via in-person    Pertinent Information:     Changes made to PTA medication list:  Added: None  Deleted: Ibuprofen, Naproxen (per patient)  Changed: None    Allergies reviewed with patient and updates made in EHR: yes    Medication History Completed By: Clarence Mancilla 10/13/2024 5:04 AM    PTA Med List   Medication Sig Last Dose    amLODIPine (NORVASC) 5 MG tablet Take 5 mg by mouth daily. 10/12/2024 at am    ammonium lactate (AMLACTIN) 12 % external cream Apply liberally to dry skin daily. 10/12/2024 at am    Cholecalciferol (VITAMIN D) 50 MCG (2000 UT) CAPS  10/12/2024 at am    fluticasone (FLONASE) 50 MCG/ACT nasal spray 2 sprays. 10/12/2024 at am    gabapentin (NEURONTIN) 100 MG capsule Take 100 mg by mouth. 10/12/2024 at am    hydrochlorothiazide (MICROZIDE) 12.5 MG capsule Take 12.5 mg by mouth. 10/12/2024 at am    hydrocortisone (CORTAID) 1 % external cream Apply topically 2 times daily as needed for rash or itching. Unknown at prn    meclizine (ANTIVERT) 25 MG tablet Take 1 tablet (25 mg) by mouth 3 times daily as needed for dizziness. Unknown at prn    omeprazole (PRILOSEC) 20 MG DR capsule Take 20 mg by mouth. 10/12/2024 at am    QUEtiapine (SEROQUEL) 50 MG tablet Take 1 tablet by mouth at bedtime. 10/12/2024 at hs    rosuvastatin (CRESTOR) 10 MG tablet Take 1 tablet (10 mg) by mouth daily. 10/12/2024 at am    SENEXON-S 8.6-50 MG tablet Take 1 tablet by mouth 2 times daily 10/12/2024 at pm    sertraline (ZOLOFT) 100 MG tablet Take 1 tablet (100 mg) by mouth daily. 10/12/2024 at am    sertraline (ZOLOFT) 50 MG tablet Take 1 tablet (50 mg) by mouth daily. 10/12/2024 at am    traZODone (DESYREL) 50 MG tablet Take 1 tablet (50 mg) by mouth at bedtime. 10/12/2024 at hs

## 2024-10-13 NOTE — ED PROVIDER NOTES
EMERGENCY DEPARTMENT ENCOUNTER       ED Course & Medical Decision Making     2:24 AM I met with the patient for the initial interview and physical examination. Discussed plan for treatment and workup in the ED.   4:05 AM Spoke with Dr Bustos (hospitalist) regarding admission  4:11 AM I rechecked with the patient and updated them on the future plan of care while in the emergency department.     Final Impression  64 year old female for evaluation after a fall.  Fell after getting into bed sometime between 11 and midnight tonight, reports that she felt dizzy after the fall, had difficulty sleeping due to the dizziness and also endorses some tingling in bilateral hands.  Patient does have a history of chronic dizziness per last primary note, has been prescribed meclizine, though they states that is not helping with her dizziness.  States that she did feel slight increase in dizziness before the fall which she thinks may have contributed to her fall.  Currently feeling generally weak, though able to squeeze hands, straight leg raise bilaterally, has some mild global weakness and appears mildly fatigued.  Vital signs stable upon arrival.  Neurologic exam is otherwise nonfocal, speech appears fairly clear, no facial droop or obvious slurring.  [place]  used for history, exam and updates with family.  Labs returned with notable hyponatremia with a sodium of 123, this is a notable drop from her prior of 137 on his last checked on routine blood work through the clinic about 10 days ago.  CTA head and neck unremarkable.  Will add on UA, urine sodium and serum osmolality to help with a.m. consults.  Given 1 L normal saline in the ER to help bring her sodium up slowly.  Will admit for her hyponatremia and likely associated symptoms.    Prior to making a final disposition on this patient the results of patient's tests and other diagnostic studies were discussed with the patient. All questions were answered. Patient  expressed understanding of the plan and was amenable to it.    Medical Decision Making    History:  Supplemental history from: Patient's son via Croatian     Work Up:  Chart documentation includes differential considered and any EKGs or imaging independently interpreted by provider, where specified.    External consultation:  Discussion of management with another provider: hospitalist    Complicating factors:  Care impacted by chronic illness: Diabetes, Hypertension, and Smoking / Nicotine Use  Care affected by social determinants of health: Other: Weekend, access to primary care    Disposition considerations: Admit.    Medications   sodium chloride 0.9% BOLUS 1,000 mL (has no administration in time range)   meclizine (ANTIVERT) tablet 50 mg (50 mg Oral $Given 10/13/24 4235)   iopamidol (ISOVUE-370) solution 75 mL (75 mLs Intravenous $Given 10/13/24 9402)     Final Impression     1. Hyponatremia    2. Dizziness    3. Syncope, unspecified syncope type        Critical Care     Performed by: Dr Nelson Hernandez  Authorized by: Dr Nelosn Hernandez  Total critical care time: 30 minutes  Critical care was necessary to treat or prevent imminent or life-threatening deterioration of the following conditions: Hyponatremia requiring admission  Critical care was time spent personally by me on the following activities: development of treatment plan with patient or surrogate, discussions with consultants, examination of patient, evaluation of patient's response to treatment, obtaining history from patient or surrogate, ordering and performing treatments and interventions, ordering and review of laboratory studies, ordering and review of radiographic studies, re-evaluation of patient's condition and monitoring for potential decompensation.  Critical care time was exclusive of separately billable procedures and treating other patients.      Chief Complaint     Chief Complaint   Patient presents with    Fall    Syncope      The patient became dizzy and fell at 0000 tonight. She did not hit her head but did lose consciousness. She is not on any thinners. She is having pain on the top of her fingers. She is still feeling dizzy.     LISA Davidson is a 64 year old female who presents for evaluation after a fall.    Per patient's son: Patient fell after she got into bed between 11 PM an 12 AM this evening. She became dizzy after the fall and could not get to sleep following this; they presented to the emergency department shortly thereafter. Patient had been feeling well prior to this episode. Patient has since had a headache for which she was given medications however this has not helped relieve her pain. He notes that the patient was previously given meclizine and that this did not help her symptoms.     Per patient: Patient reports that she had been feeling slightly dizzy prior to her fall which occurred when she attempted to get out of bed to use the bathroom this evening. She now endorses a sensation of pins and needles which alternates between affecting the base and tips of her fingers. She further endorses continuing to feel dizzy. Patient has no history of similar symptoms.     I, Kenny Lafleur am serving as a scribe to document services personally performed by Dr. Nelson Hernandez MD, based on my observation and the provider's statements to me. I, Dr. Nelson Hernandez MD attest that Kenny Lafleur is acting in a scribe capacity, has observed my performance of the services and has documented them in accordance with my direction.    Physical Exam     BP (!) 149/93   Pulse 73   Temp 97.8  F (36.6  C) (Oral)   Resp 18   SpO2 98%   Constitutional: Awake, alert, in no acute distress.  Head: Normocephalic, atraumatic.  ENT: Mucous membranes moist.  Eyes: Conjunctiva normal.  Respiratory: Respirations even, unlabored, in no acute respiratory distress.  Cardiovascular: Regular rate and rhythm. Good peripheral perfusion.  GI:  Abdomen soft, non-tender.  Musculoskeletal: Moves all 4 extremities equally.  Integument: Warm, dry.  Neurologic: Alert & oriented x 3. Normal speech. Grossly normal motor and sensory function. No focal deficits noted.  Psychiatric: Normal mood    Labs & Imaging     Results for orders placed or performed during the hospital encounter of 10/13/24   Basic metabolic panel   Result Value Ref Range    Sodium 123 (L) 135 - 145 mmol/L    Potassium 3.6 3.4 - 5.3 mmol/L    Chloride 88 (L) 98 - 107 mmol/L    Carbon Dioxide (CO2) 23 22 - 29 mmol/L    Anion Gap 12 7 - 15 mmol/L    Urea Nitrogen 7.7 (L) 8.0 - 23.0 mg/dL    Creatinine 0.60 0.51 - 0.95 mg/dL    GFR Estimate >90 >60 mL/min/1.73m2    Calcium 9.4 8.8 - 10.4 mg/dL    Glucose 162 (H) 70 - 99 mg/dL   CBC with platelets and differential   Result Value Ref Range    WBC Count 6.3 4.0 - 11.0 10e3/uL    RBC Count 4.02 3.80 - 5.20 10e6/uL    Hemoglobin 11.6 (L) 11.7 - 15.7 g/dL    Hematocrit 33.7 (L) 35.0 - 47.0 %    MCV 84 78 - 100 fL    MCH 28.9 26.5 - 33.0 pg    MCHC 34.4 31.5 - 36.5 g/dL    RDW 12.1 10.0 - 15.0 %    Platelet Count 483 (H) 150 - 450 10e3/uL    % Neutrophils 50 %    % Lymphocytes 37 %    % Monocytes 10 %    % Eosinophils 2 %    % Basophils 1 %    % Immature Granulocytes 0 %    NRBCs per 100 WBC 0 <1 /100    Absolute Neutrophils 3.1 1.6 - 8.3 10e3/uL    Absolute Lymphocytes 2.3 0.8 - 5.3 10e3/uL    Absolute Monocytes 0.7 0.0 - 1.3 10e3/uL    Absolute Eosinophils 0.1 0.0 - 0.7 10e3/uL    Absolute Basophils 0.0 0.0 - 0.2 10e3/uL    Absolute Immature Granulocytes 0.0 <=0.4 10e3/uL    Absolute NRBCs 0.0 10e3/uL       EKG     EKG reviewed and independently interpreted as below;  Sinus rhythm, rate 79.  .  QRS 84.  QTc 499.  No STEMI.    Procedures          Bambenek, Nelson, MD  10/13/24 9979

## 2024-10-13 NOTE — ED TRIAGE NOTES
The patient became dizzy and fell at 0000 tonight. She did not hit her head but did lose consciousness. She is not on any thinners. She is having pain on the top of her fingers. She is still feeling dizzy.

## 2024-10-13 NOTE — PROGRESS NOTES
Patient was admitted by my colleague earlier this morning.  Admission H&P reviewed. Agree with the assessments and plan.

## 2024-10-13 NOTE — CONSULTS
Consultation - INFECTIOUS DISEASE CONSULTATION  Doug Davidson,  1960, MRN [id number]      Dizziness [R42]  Hyponatremia [E87.1]  Syncope, unspecified syncope type [R55]    PCP: Suzanne Thayer, [phone]   Code status:  Full Code               Chief Complaint: Positive QuantiFERON gold     Assessment:  Doug Davidson is a 64 year old old female with   Diabetes mellitis.  A1c of 6.5 on 10/3/2024.  Positive QuantiFERON gold.  No fever.  CT scan with scattered bilateral calcified granulomas compatible with old granulomatous disease. No significant intrathoracic abnormality, including pulmonary findings of tuberculosis.  No  available tonight.  Suspecting either previously treated tuberculosis or latent TB.         Recommendations:   ID will see tomorrow with  to clarify whether she has ever been tested or treated for tuberculosis.  Consideration will be given to LTBI treatment she has never been treated for tuberculosis in the past.    Discussed briefly with family and patient but limited communication    Thank you for letting us be part of the patient care team. We will follow.    Aicha Davis MD,MD  [facility].   [facility]  Office Telephone [phone].  Fax [phone]  [facility] paging    HPI:    Doug Davidson is a 64 year old old female. History is provided by chart.  ID is asked to see this patient for positive QuantiFERON gold.  The patient has a history of diabetes mellitus.  Per chart review, she is admitted with weakness most likely secondary to hyponatremia.   Had a positive QuantiFERON gold on 10/3/2024.  Seen today, limited communication.  No  available        ===========================================    Medical History  Past Medical History:   Diagnosis Date    Tobacco use disorder 2023    Formatting of this note might be different from the original.   2 cig per day          Surgical  History  No previous surgery         Social History  Reviewed, and she  reports that she has been smoking. She does not have any smokeless tobacco history on file.        Family History  Family History   Problem Relation Age of Onset    Colon Cancer No family hx of     Breast Cancer No family hx of       Psychosocial Needs  Social History     Social History Narrative    Not on file     Additional psychosocial needs reviewed per nursing assessment.       No Known Allergies     Medications Prior to Admission   Medication Sig Dispense Refill Last Dose    amLODIPine (NORVASC) 5 MG tablet Take 5 mg by mouth daily.   10/12/2024 at am    ammonium lactate (AMLACTIN) 12 % external cream Apply liberally to dry skin daily.   10/12/2024 at am    Cholecalciferol (VITAMIN D) 50 MCG (2000 UT) CAPS    10/12/2024 at am    fluticasone (FLONASE) 50 MCG/ACT nasal spray 2 sprays.   10/12/2024 at am    gabapentin (NEURONTIN) 100 MG capsule Take 100 mg by mouth.   10/12/2024 at am    hydrochlorothiazide (MICROZIDE) 12.5 MG capsule Take 12.5 mg by mouth.   10/12/2024 at am    hydrocortisone (CORTAID) 1 % external cream Apply topically 2 times daily as needed for rash or itching.   Unknown at prn    meclizine (ANTIVERT) 25 MG tablet Take 1 tablet (25 mg) by mouth 3 times daily as needed for dizziness. 30 tablet 0 Unknown at prn    omeprazole (PRILOSEC) 20 MG DR capsule Take 20 mg by mouth.   10/12/2024 at am    QUEtiapine (SEROQUEL) 50 MG tablet Take 1 tablet by mouth at bedtime.   10/12/2024 at hs    rosuvastatin (CRESTOR) 10 MG tablet Take 1 tablet (10 mg) by mouth daily. 30 tablet 0 10/12/2024 at am    SENEXON-S 8.6-50 MG tablet Take 1 tablet by mouth 2 times daily   10/12/2024 at pm    sertraline (ZOLOFT) 100 MG tablet Take 1 tablet (100 mg) by mouth daily. 30 tablet 0 10/12/2024 at am    sertraline (ZOLOFT) 50 MG tablet Take 1 tablet (50 mg) by mouth daily. 30 tablet 0 10/12/2024 at am    traZODone (DESYREL) 50 MG tablet Take 1 tablet  (50 mg) by mouth at bedtime. 30 tablet 0 10/12/2024 at hs        Review of Systems:  Negative except for findings in the HPI Physical Exam:  Temp:  [97.8  F (36.6  C)-98.2  F (36.8  C)] 98.2  F (36.8  C)  Pulse:  [71-99] 75  Resp:  [13-25] 16  BP: (102-171)/(61-99) 102/68  SpO2:  [93 %-99 %] 96 %    Gen: Pleasant in no acute distress.  HEENT: NCAT. EOMI. PERRL.  Neck: No bruit, JVD or thyromegaly.  Lungs: Clear to ascultation bilat with no crackles or wheezes.  Card: RRR. NSR. No RMG. Peripheral pulses present and symmetric. No edema.  Abd: Soft NT ND. No mass. Normal bowel sounds.  Skin: No rash.  Extr: No edema.  Neuro: Alert and oriented to place time and person.         ============================    Pertinent Labs  personally reviewed.   Recent Labs   Lab 10/13/24  0630 10/13/24  0252   WBC 7.1 6.3   HGB 12.0 11.6*   HCT 34.6* 33.7*   * 483*       Recent Labs   Lab 10/13/24  1331 10/13/24  1006 10/13/24  0630   * 128* 125*   CO2 22 23 21*   BUN 7.2* 6.3* 6.7*   ALBUMIN  --   --  4.4   ALKPHOS  --   --  156*   ALT  --   --  11   AST  --   --  27       MICROBIOLOGY DATA:  Personally reviewed      Pertinent Radiology  personally reviewed.     Study Result    Narrative & Impression   EXAM: CT CHEST W/O CONTRAST  LOCATION: [facility]  DATE: 10/13/2024     INDICATION: Weakness. Evaluate for tuberculosis.  COMPARISON: None.  TECHNIQUE: CT chest without IV contrast. Multiplanar reformats were obtained. Dose reduction techniques were used.  CONTRAST: None.     FINDINGS:   LUNGS AND PLEURA: Mild atelectasis is seen in the bilateral lower lobes. Scattered 2 mm calcified granulomas bilaterally. No confluent consolidation, pleural effusion, pneumothorax or architectural distortion.     MEDIASTINUM/AXILLAE: No lymphadenopathy. No thoracic aortic aneurysm. Heart size is normal without pericardial effusion.     CORONARY ARTERY CALCIFICATION: None.     UPPER ABDOMEN: No acute findings.      MUSCULOSKELETAL: No significant osseous abnormality.                                                                         IMPRESSION:      1.  Scattered bilateral calcified granulomas compatible with old granulomatous disease. No significant intrathoracic abnormality, including pulmonary findings of tuberculosis.

## 2024-10-13 NOTE — H&P
[facility]    History and Physical - Hospitalist Service       Date of Admission:  10/13/2024    Assessment & Plan   Doug Davidson is a 64 year old female with a medical history of nicotine use, recent positive QuantiFERON test, remote history of Strongyloides infection, type II DM, depression, hypertension, intermittent vertigo/dizziness who is admitted due to ongoing weakness from possible acute hynatremia vd from another cause    Patient Active Problem List   Diagnosis    Essential hypertension    CRISTINO (generalized anxiety disorder)    Gastritis, chronic    Heme positive stool    High cholesterol    History of Helicobacter pylori infection    Latent tuberculosis    Major depressive disorder, single episode, severe with psychotic features (H)    Strongyloides stercoralis infection    Major depressive disorder, recurrent, moderate (H)    Tobacco use disorder    Pain in joint, ankle and foot, right    Type 2 diabetes mellitus without complication, without long-term current use of insulin (H)    Other insomnia    Dizziness    Hyponatremia    Syncope, unspecified syncope type      1.Status Post Fall    Etiology: Likely related to dizziness, though the underlying cause of the dizziness remains unclear.  Plan of Care: Ongoing monitoring. CTA neck is negative. TTE is pending to assess cardiac function. Check D-dimer to rule out embolic causes. Physical and occupational therapy (PT/OT) will be consulted when appropriate.  Hyponatremia may be contributing.    2. Acute Hyponatremia    Etiology: Likely multifactorial, needs further workup.  Plan of Care: 1L saline bolus already given by the ER. Continue to monitor sodium closely with targeted correction of 0.5 mEq/L per hour. Check magnesium, TSH, and random cortisol levels to rule out other causes. Am team to follow. Tierra is on hydrochlorothiazide, likely contributing to hyponatremia. Will hold for now    3. ?Chronic Vertigo/Dizziness    Etiology:  Unknown etiology; could be related to B12 deficiency, cardiac, or vestibular causes. Tierra reports it it just started. ?? Some language barrier with communication.   Plan of Care: Check B12 levels, follow up on TTE results to assess pulmonary pressures, and check D-dimer to rule out thromboembolic causes.    4. Positive TB Test/Latent TB    Etiology: Positive QuantiFERON test, concern for latent tuberculosis. Noted she had the BCG scar but positive less likely related.   Plan of Care: Consult infectious disease (ID). CT with granulomatous disease not concerning for TB.  The patient is in a negative pressure room to prevent transmission however. Will await ID clearance . There is concern that the granulomatous reaction noted in the right lung may be related to Strongyloides infection, which also affects the lungs, GI tract, and liver. ID and pulmonology have been consulted.    5. History of Strongyloides Infection    Etiology: Risk of recurrence if not properly treated.  Plan of Care: Granulomatous appearance in the right lung may be related to Strongyloides infection. Infectious disease (ID) consulted for input and management.    6.Type II Diabetes Mellitus    Etiology: Chronic condition requiring glycemic control.  Plan of Care: Accu-checks for blood glucose monitoring and sliding scale insulin as needed.    7. Major Depression    Etiology: Pre-existing condition.  Plan of Care: Resume outpatient psychiatric care and continue current medications. Resume trazodone and zoloft. Also resume sequel. Will decrease doses of seroquel and trazodone. Check EKG for QT prolongation    8. Gastritis/GERD/History of H. Pylori    Etiology: Chronic gastric issues, history of H. pylori infection.  Plan of Care: Continue proton pump inhibitor (PPI) therapy. The a.m. team will follow up.    9. Other Medical Problems    Plan of Care: Management of the patient's other medical issues remains unchanged.           Diet:  regular  DVT  "Prophylaxis: Pneumatic Compression Devices  Montes Catheter: Not present  Lines: None     Cardiac Monitoring: None  Code Status:  full code    Clinically Significant Risk Factors Present on Admission         # Hyponatremia: Lowest Na = 123 mmol/L in last 2 days, will monitor as appropriate  # Hypochloremia: Lowest Cl = 88 mmol/L in last 2 days, will monitor as appropriate          # Hypertension: Noted on problem list        # DMII: A1C = 6.5 % (Ref range: 0.0 - 5.6 %) within past 6 months               Disposition Plan     Medically Ready for Discharge: Anticipated Tomorrow           Monalisa Bustos MD  Hospitalist Service  [facility]  Securely message with [employer] (more info)  Text page via [facility] Paging/Directory     ______________________________________________________________________    Chief Complaint   dizziness        History of Present Illness   Doug Davidson is a 64 year old female with a medical history of nicotine use, recent positive QuantiFERON test, remote history of Strongyloides infection, type II DM, depression, hypertension, intermittent vertigo/dizziness who is admitted due to ongoing weakness from possible acute natremia versus other    Per history, the  the patient's son reports  she fell between 11 PM and 12 AM after getting into bed and experienced dizziness afterward, preventing her from sleeping. She was brought to the emergency department shortly after the fall. The patient had been feeling well prior to this episode. Following the fall, she developed a headache, which has not improved with medication. Her son notes that she was previously prescribed meclizine for vertigo, but it was not effective.    The patient reports that she felt slightly dizzy prior to the fall, which occurred when she attempted to get out of bed to use the bathroom. After the fall, she developed a \"pins and needles\" sensation in her fingers, alternating between the base and tips, and continues " to feel dizzy. She denies any history of similar episodes in the past.    Initial lab results show hyponatremia with a sodium level of 123, which is significantly lower than her baseline. Serial sodium levels are being monitored. A CT of the head has been ordered to evaluate for any intracranial pathology related to the fall, and further workup is ongoing.      CT chest done showed below:  EXAM: CT CHEST W/O CONTRAST  LOCATION: [facility]  DATE: 10/13/2024     INDICATION: Weakness. Evaluate for tuberculosis.  COMPARISON: None.  TECHNIQUE: CT chest without IV contrast. Multiplanar reformats were obtained. Dose reduction techniques were used.  CONTRAST: None.     FINDINGS:   LUNGS AND PLEURA: Mild atelectasis is seen in the bilateral lower lobes. Scattered 2 mm calcified granulomas bilaterally. No confluent consolidation, pleural effusion, pneumothorax or architectural distortion.     MEDIASTINUM/AXILLAE: No lymphadenopathy. No thoracic aortic aneurysm. Heart size is normal without pericardial effusion.     CORONARY ARTERY CALCIFICATION: None.     UPPER ABDOMEN: No acute findings.     MUSCULOSKELETAL: No significant osseous abnormality.                                                                         IMPRESSION:      1.  Scattered bilateral calcified granulomas compatible with old granulomatous disease. No significant intrathoracic abnormality, including pulmonary findings of tuberculosis.    Patient was seen in the ER with her 2 daughters.  She denies any ongoing history of night sweats, weight loss, chronic cough back pain, fevers or chills.  She also denies abdominal pain, nausea, vomiting or diarrhea.  She does mention however that her dizziness is recent.  It is noted she has a history of H. pylori infection, Strongyloides infection and has a positive TB test currently.  She denies any prior history of treatment for TB.  She also does not recall any known history of stone  Strongyloides infection.  Of significance is the lifecycle of strongyloidiasis infection which travels through the hepatic and pulmonary system as well as the GI tract and may cause granulomatous reactions.  Infectious disease and pulmonologist has been consulted.    Past Medical History    Past Medical History:   Diagnosis Date    Tobacco use disorder 2023    Formatting of this note might be different from the original.   2 cig per day         Past Surgical History   No past surgical history on file.    Prior to Admission Medications   Prior to Admission Medications   Prescriptions Last Dose Informant Patient Reported? Taking?   Cholecalciferol (VITAMIN D) 50 MCG ( UT) CAPS   Yes No   QUEtiapine (SEROQUEL) 50 MG tablet   Yes No   Sig: Take 1 tablet by mouth at bedtime.   QUEtiapine (SEROQUEL) 50 MG tablet   No No   Sig: Take 1 tablet (50 mg) by mouth 2 times daily.   SENEXON-S 8.6-50 MG tablet   Yes No   Sig: Take 1 tablet by mouth 2 times daily   amLODIPine (NORVASC) 5 MG tablet   Yes No   Sig: Take 5 mg by mouth daily.   amLODIPine (NORVASC) 5 MG tablet   No No   Sig: Take 1 tablet (5 mg) by mouth daily.   ammonium lactate (AMLACTIN) 12 % external cream   Yes No   Sig: Apply liberally to dry skin daily.   fluticasone (FLONASE) 50 MCG/ACT nasal spray   Yes No   Si sprays.   gabapentin (NEURONTIN) 100 MG capsule   Yes No   Sig: Take 100 mg by mouth.   hydroCHLOROthiazide 12.5 MG tablet   No No   Sig: Take 1 tablet (12.5 mg) by mouth daily.   hydrochlorothiazide (MICROZIDE) 12.5 MG capsule   Yes No   Sig: Take 12.5 mg by mouth.   hydrocortisone (CORTAID) 1 % external cream   Yes No   Sig: APPLY SMALL AMOUNT TO ITCHY AREAS TWICE DAILY AS NEEDED   ibuprofen (ADVIL/MOTRIN) 200 MG tablet   No No   Sig: Take 2 tablets (400 mg) by mouth every 6 hours as needed for mild pain   meclizine (ANTIVERT) 25 MG tablet   No No   Sig: Take 1 tablet (25 mg) by mouth every 8 hours as needed for dizziness   meclizine  (ANTIVERT) 25 MG tablet   No No   Sig: Take 1 tablet (25 mg) by mouth 3 times daily as needed for dizziness.   naproxen (NAPROSYN) 500 MG tablet   No No   Sig: Take 1 tablet (500 mg) by mouth 2 times daily (with meals)   omeprazole (PRILOSEC) 20 MG DR capsule   Yes No   Sig: Take 20 mg by mouth.   rosuvastatin (CRESTOR) 10 MG tablet   Yes No   rosuvastatin (CRESTOR) 10 MG tablet   No No   Sig: Take 1 tablet (10 mg) by mouth daily.   sertraline (ZOLOFT) 100 MG tablet   Yes No   Sig: TAKE 1 & 1/2 TABLET BY MOUTH DAILY   sertraline (ZOLOFT) 100 MG tablet   No No   Sig: Take 1 tablet (100 mg) by mouth daily.   sertraline (ZOLOFT) 50 MG tablet   No No   Sig: Take 1 tablet (50 mg) by mouth daily.   traZODone (DESYREL) 50 MG tablet   Yes No   Sig: Take 1 tablet by mouth at bedtime.   traZODone (DESYREL) 50 MG tablet   No No   Sig: Take 1 tablet (50 mg) by mouth at bedtime.      Facility-Administered Medications: None        Review of Systems    The 10 point Review of Systems is negative other than noted in the HPI or here.     Social History   I have reviewed this patient's social history and updated it with pertinent information if needed.  Social History     Tobacco Use    Smoking status: Some Days   Vaping Use    Vaping status: Former     Originally from [facility]     Family History   No reported hx of Tb    Allergies   No Known Allergies     Physical Exam   Vital Signs: Temp: 97.8  F (36.6  C) Temp src: Oral BP: (!) 149/93 Pulse: 73   Resp: 18 SpO2: 98 % O2 Device: None (Room air)    Weight: 0 lbs 0 oz      General Aox3, appropriate affect, NAD, on RA  HEENT  MMM, EOMI, PERRL  Chest Adeq E b/l, No wheezing  Heart RRR, No M/R/G  Abd- Soft, NT, BS+  - Deferred,   Extremity- Moving all extremities, No digital clubbing,   No edema  Neuro- Aox3,grossly non focal,  gait not checked  Skin  Has no tattoo, No skin rash     Medical Decision Making       85 MINUTES SPENT BY ME on the date of service doing chart review, history,  exam, documentation & further activities per the note.      ------------------ MEDICAL DECISION MAKING ------------------------------------------------------------------------------------------------------  MANAGEMENT DISCUSSED with the following over the past 24 hours: patient and care team       Data   ------------------------- PAST 24 HR DATA REVIEWED -----------------------------------------------    I have personally reviewed the following data over the past 24 hrs:    6.3  \   11.6 (L)   / 483 (H)     123 (L) 88 (L) 7.7 (L) /  162 (H)   3.6 23 0.60 \       Imaging results reviewed over the past 24 hrs:   No results found for this or any previous visit (from the past 24 hour(s)).

## 2024-10-13 NOTE — ED NOTES
[facility] ED Handoff Report    ED Chief Complaint: fall in bathroom    ED Diagnosis:  (E87.1) Hyponatremia  Comment: NA-128, previous 123  Plan: continue to monitor    (R42) Dizziness  Comment: pt has hx of dizziness  Plan: continue to monitor    (R55) Syncope, unspecified syncope type  Comment: head ct neg  Plan: continue to monitor       PMH:    Past Medical History:   Diagnosis Date    Tobacco use disorder 02/01/2023    Formatting of this note might be different from the original.   2 cig per day          Code Status:  Full Code     Falls Risk: Yes Band: Applied    Current Living Situation/Residence: lives in a house     Elimination Status: Continent: Yes     Activity Level: SBA    Patients Preferred Language:  Other: [facility]     Needed: Yes    Vital Signs:  /72   Pulse 72   Temp 97.8  F (36.6  C) (Oral)   Resp 15   SpO2 96%      EKG: SR    Pain Score: 0/10    Is the Patient Confused:  No    Last Food or Drink: 10/13/24 at afternoon      Tests Performed: Done: Labs and Imaging    Treatments Provided:  see mar    Family Dynamics/Concerns: No    Family Updated On Visitor Policy: Yes    Plan of Care Communicated to Family: Yes    Who Was Updated about Plan of Care: daughter    Belongings Checklist Done and Signed by Patient: Yes    Medications sent with patient: n/a    Covid: asymptomatic , not tested    Additional Information: Pt HAS ACTIVE TB    RN: Sagrario tSone RN 10/13/2024 2:17 PM

## 2024-10-13 NOTE — ED NOTES
The patient would not answer if she has had thoughts of hurting herself. Her family reports that she has a history of trying to hurt herself (pulling hair and hitting) and others (hitting). He reports that this had happened multiple times over the past month. MD updated.

## 2024-10-14 ENCOUNTER — APPOINTMENT (OUTPATIENT)
Dept: OCCUPATIONAL THERAPY | Facility: HOSPITAL | Age: 64
End: 2024-10-14
Attending: INTERNAL MEDICINE
Payer: COMMERCIAL

## 2024-10-14 ENCOUNTER — APPOINTMENT (OUTPATIENT)
Dept: PHYSICAL THERAPY | Facility: HOSPITAL | Age: 64
End: 2024-10-14
Attending: INTERNAL MEDICINE
Payer: COMMERCIAL

## 2024-10-14 LAB
ANION GAP SERPL CALCULATED.3IONS-SCNC: 11 MMOL/L (ref 7–15)
ATRIAL RATE - MUSE: 79 BPM
BUN SERPL-MCNC: 14.7 MG/DL (ref 8–23)
CALCIUM SERPL-MCNC: 9 MG/DL (ref 8.8–10.4)
CHLORIDE SERPL-SCNC: 98 MMOL/L (ref 98–107)
CREAT SERPL-MCNC: 0.84 MG/DL (ref 0.51–0.95)
DIASTOLIC BLOOD PRESSURE - MUSE: 107 MMHG
EGFRCR SERPLBLD CKD-EPI 2021: 76 ML/MIN/1.73M2
GLUCOSE BLDC GLUCOMTR-MCNC: 116 MG/DL (ref 70–99)
GLUCOSE BLDC GLUCOMTR-MCNC: 134 MG/DL (ref 70–99)
GLUCOSE BLDC GLUCOMTR-MCNC: 182 MG/DL (ref 70–99)
GLUCOSE BLDC GLUCOMTR-MCNC: 94 MG/DL (ref 70–99)
GLUCOSE SERPL-MCNC: 119 MG/DL (ref 70–99)
HCO3 SERPL-SCNC: 24 MMOL/L (ref 22–29)
INTERPRETATION ECG - MUSE: NORMAL
MAGNESIUM SERPL-MCNC: 2.1 MG/DL (ref 1.7–2.3)
P AXIS - MUSE: 42 DEGREES
POTASSIUM SERPL-SCNC: 3.9 MMOL/L (ref 3.4–5.3)
POTASSIUM SERPL-SCNC: 4 MMOL/L (ref 3.4–5.3)
PR INTERVAL - MUSE: 178 MS
QRS DURATION - MUSE: 84 MS
QT - MUSE: 436 MS
QTC - MUSE: 499 MS
R AXIS - MUSE: -23 DEGREES
SODIUM SERPL-SCNC: 133 MMOL/L (ref 135–145)
SYSTOLIC BLOOD PRESSURE - MUSE: 175 MMHG
T AXIS - MUSE: 43 DEGREES
VENTRICULAR RATE- MUSE: 79 BPM
VIT B12 SERPL-MCNC: 321 PG/ML (ref 232–1245)

## 2024-10-14 PROCEDURE — 97530 THERAPEUTIC ACTIVITIES: CPT | Mod: GO

## 2024-10-14 PROCEDURE — 83735 ASSAY OF MAGNESIUM: CPT | Performed by: INTERNAL MEDICINE

## 2024-10-14 PROCEDURE — 99207 PR NO CHARGE LOS: CPT | Performed by: INTERNAL MEDICINE

## 2024-10-14 PROCEDURE — 84132 ASSAY OF SERUM POTASSIUM: CPT | Performed by: INTERNAL MEDICINE

## 2024-10-14 PROCEDURE — 97162 PT EVAL MOD COMPLEX 30 MIN: CPT | Mod: GP

## 2024-10-14 PROCEDURE — 250N000011 HC RX IP 250 OP 636: Performed by: INTERNAL MEDICINE

## 2024-10-14 PROCEDURE — 96376 TX/PRO/DX INJ SAME DRUG ADON: CPT

## 2024-10-14 PROCEDURE — 80048 BASIC METABOLIC PNL TOTAL CA: CPT | Performed by: INTERNAL MEDICINE

## 2024-10-14 PROCEDURE — 82962 GLUCOSE BLOOD TEST: CPT

## 2024-10-14 PROCEDURE — 96375 TX/PRO/DX INJ NEW DRUG ADDON: CPT

## 2024-10-14 PROCEDURE — G0378 HOSPITAL OBSERVATION PER HR: HCPCS

## 2024-10-14 PROCEDURE — 97165 OT EVAL LOW COMPLEX 30 MIN: CPT | Mod: GO

## 2024-10-14 PROCEDURE — 250N000013 HC RX MED GY IP 250 OP 250 PS 637: Performed by: INTERNAL MEDICINE

## 2024-10-14 PROCEDURE — 82607 VITAMIN B-12: CPT | Performed by: INTERNAL MEDICINE

## 2024-10-14 PROCEDURE — 99233 SBSQ HOSP IP/OBS HIGH 50: CPT | Performed by: INTERNAL MEDICINE

## 2024-10-14 PROCEDURE — 36415 COLL VENOUS BLD VENIPUNCTURE: CPT | Performed by: INTERNAL MEDICINE

## 2024-10-14 RX ORDER — MECLIZINE HCL 12.5 MG 12.5 MG/1
12.5 TABLET ORAL 3 TIMES DAILY PRN
Status: DISCONTINUED | OUTPATIENT
Start: 2024-10-14 | End: 2024-10-15 | Stop reason: HOSPADM

## 2024-10-14 RX ADMIN — QUETIAPINE FUMARATE 25 MG: 25 TABLET ORAL at 21:06

## 2024-10-14 RX ADMIN — GABAPENTIN 100 MG: 100 CAPSULE ORAL at 09:03

## 2024-10-14 RX ADMIN — ONDANSETRON 4 MG: 2 INJECTION INTRAMUSCULAR; INTRAVENOUS at 10:09

## 2024-10-14 RX ADMIN — SERTRALINE HYDROCHLORIDE 50 MG: 50 TABLET ORAL at 09:08

## 2024-10-14 RX ADMIN — SERTRALINE HYDROCHLORIDE 100 MG: 100 TABLET ORAL at 09:03

## 2024-10-14 RX ADMIN — GABAPENTIN 100 MG: 100 CAPSULE ORAL at 21:06

## 2024-10-14 RX ADMIN — ROSUVASTATIN 10 MG: 10 TABLET, FILM COATED ORAL at 09:03

## 2024-10-14 RX ADMIN — FAMOTIDINE 20 MG: 10 INJECTION, SOLUTION INTRAVENOUS at 21:06

## 2024-10-14 RX ADMIN — PANTOPRAZOLE SODIUM 40 MG: 40 TABLET, DELAYED RELEASE ORAL at 09:07

## 2024-10-14 RX ADMIN — FAMOTIDINE 20 MG: 10 INJECTION, SOLUTION INTRAVENOUS at 09:03

## 2024-10-14 RX ADMIN — TRAZODONE HYDROCHLORIDE 25 MG: 50 TABLET ORAL at 21:06

## 2024-10-14 RX ADMIN — FLUTICASONE PROPIONATE 1 SPRAY: 50 SPRAY, METERED NASAL at 09:03

## 2024-10-14 ASSESSMENT — ACTIVITIES OF DAILY LIVING (ADL)
ADLS_ACUITY_SCORE: 23
ADLS_ACUITY_SCORE: 23
ADLS_ACUITY_SCORE: 31
ADLS_ACUITY_SCORE: 23
ADLS_ACUITY_SCORE: 30
ADLS_ACUITY_SCORE: 31
ADLS_ACUITY_SCORE: 23
ADLS_ACUITY_SCORE: 30
ADLS_ACUITY_SCORE: 31
ADLS_ACUITY_SCORE: 31
ADLS_ACUITY_SCORE: 23
ADLS_ACUITY_SCORE: 31
DEPENDENT_IADLS:: CLEANING;COOKING;LAUNDRY;SHOPPING;MEAL PREPARATION;MEDICATION MANAGEMENT;MONEY MANAGEMENT;TRANSPORTATION
ADLS_ACUITY_SCORE: 31
ADLS_ACUITY_SCORE: 23

## 2024-10-14 NOTE — PROGRESS NOTES
10/14/24 1320   Appointment Info   Signing Clinician's Name / Credentials (PT) Giovanna Don PT   Quick Adds   Quick Adds Certification;Vestibular Eval       Present yes   Language Syriac  via langauge line on [employer]   Living Environment   People in Home child(uma), adult  (son and DIL)   Current Living Arrangements house   Home Accessibility no concerns   Transportation Anticipated family or friend will provide   Self-Care   Equipment Currently Used at Home cane, straight;walker, rolling   Activity/Exercise/Self-Care Comment assist with ADL/IADL's; independent amb. with walker   General Information   Onset of Illness/Injury or Date of Surgery 10/13/24   Referring Physician Dr. Bridges   Patient/Family Therapy Goals Statement (PT) less dizziness   Pertinent History of Current Problem (include personal factors and/or comorbidities that impact the POC) fall, dizziness, hyponatremia; PMH of  nicotine use, recent positive QuantiFERON test, remote history of Strongyloides infection, type II DM, depression, hypertension, intermittent vertigo/dizziness   Existing Precautions/Restrictions fall   Cognition   Follows Commands (Cognition) WFL   Range of Motion (ROM)   Range of Motion ROM is WFL   Strength (Manual Muscle Testing)   Strength (Manual Muscle Testing) Deficits observed during functional mobility   Bed Mobility   Bed Mobility supine-sit;sit-supine   Supine-Sit [place] (Bed Mobility) minimum assist (75% patient effort)   Sit-Supine [place] (Bed Mobility) minimum assist (75% patient effort)   Transfers   Transfers sit-stand transfer   Sit-Stand Transfer   Sit-Stand [place] (Transfers) contact guard;verbal cues;nonverbal cues (demo/gesture)   Assistive Device (Sit-Stand Transfers) walker, 4-wheeled   Comment, (Sit-Stand Transfer) cuing for hand placement and brake use   Gait/Stairs (Locomotion)   [place] Level (Gait) supervision;contact guard;nonverbal cues  (demo/gesture);verbal cues   Assistive Device (Gait) walker, 4-wheeled   Distance in Feet (Gait) 150   Pattern (Gait) step-through   Deviations/Abnormal Patterns (Gait) brandon decreased;gait speed decreased;antalgic  (pt began using walker about 5 months ago due to leg pain)   Oculomotor Exam   Smooth Pursuit Abnormal   Smooth Pursuit Comment pt difficulty with smooth pursuit and reports seeing therapist moving so difficulty focusing; dizziness all the time unchanged with position   Saccades Abnormal   Clinical Impression   Criteria for Skilled Therapeutic Intervention Yes, treatment indicated   PT Diagnosis (PT) impaired functional mobility   Influenced by the following impairments decreased strength, balance   Functional limitations due to impairments bed mob., transfers, gait   Clinical Presentation (PT Evaluation Complexity) evolving   Clinical Presentation Rationale pt presents as medically diagnosed   Clinical Decision Making (Complexity) moderate complexity   Planned Therapy Interventions (PT) balance training;bed mobility training;gait training;home exercise program;neuromuscular re-education;patient/family education;strengthening;transfer training   Risk & Benefits of therapy have been explained evaluation/treatment results reviewed;patient   Clinical Impression Comments pt having dizziness with all positions and difficulty completing oculomotor exam at this time   PT Total Evaluation Time   PT Eval, Moderate Complexity Minutes (18269) 30   Therapy Certification   Start of care date 10/14/24   Certification date from 10/14/24   Certification date to 11/14/24   Medical Diagnosis dizziness   Physical Therapy Goals   PT Frequency Daily   PT Predicted Duration/Target Date for Goal Attainment 10/21/24   PT Goals Bed Mobility;Transfers;Gait   PT: Bed Mobility Supervision/stand-by assist;Supine to/from sit   PT: Transfers Supervision/stand-by assist;Sit to/from stand;Assistive device   PT: Gait  Supervision/stand-by assist;Greater than 200 feet;Rolling walker   PT Discharge Planning   PT Plan retest vestibular if able to tolerate; gait with FWW (bring)   PT Discharge Recommendation (DC Rec) home with assist;home with home care physical therapy   PT Rationale for DC Rec family can assist and pt moving well with walker sba; continue with home vs OP PT pending progress   PT Brief overview of current status amb with walker sba   Total Session Time   Total Session Time (sum of timed and untimed services) 30   M [facility] Services  OUTPATIENT PHYSICAL THERAPY EVALUATION  PLAN OF TREATMENT FOR OUTPATIENT REHABILITATION  (COMPLETE FOR INITIAL CLAIMS ONLY)  Patient's Last Name, First Name, M.I.  YOB: 1960  Doug Davidson                        Provider's Name  [facility] Medical Record No.  [id number]                             Onset Date:  10/13/24   Start of Care Date:  10/14/24   Type:     _X_PT   ___OT   ___SLP Medical Diagnosis:  dizziness              PT Diagnosis:  impaired functional mobility Visits from SOC:  1     See note for plan of treatment, functional goals and certification details    I CERTIFY THE NEED FOR THESE SERVICES FURNISHED UNDER        THIS PLAN OF TREATMENT AND WHILE UNDER MY CARE     (Physician co-signature of this document indicates review and certification of the therapy plan).

## 2024-10-14 NOTE — PROGRESS NOTES
Called both TB experts at [facility].  Left message    She could discharge today while waiting to discuss with them and see me in office in 2 to 3 months where I would recheck her QFT and reassess need for treatment vs observation. She is very very weakly + on the screening test, and likely would just observe and not treat at these values.     FRAN Angulo MD

## 2024-10-14 NOTE — CONSULTS
SPIRITUAL HEALTH SERVICES CONSULT NOTE  [facility]; P1    Saw pt Doug SWANSON Lety per Spiritual Care Consult (Due to admission screening response)    Patient/Family Understanding of Illness and Goals of Care - Met with Doug and her son at her bedside. Our conversation was facilitated by Cait, a Frisian  over the [employer] Language [employer]. Doug engaged in very brief conversation before stating that she was dizzy and did not want to talk anymore. Her son says that dizziness is her main concern at this time and that they hope to speak with the hospitalist about this later tonight.     Distress and Loss - Not addressed during this visit    Strengths, Coping, and Resources - Not addressed during this visit.  Her son mentioned that a daughter will be stopping by tonight.     Meaning, Beliefs, and Spirituality - Doug is Christian. There is no spiritual community that she wants notified for her support at this time. I encouraged her to let nursing staff know if she desires further support from Spiritual Care.     Plan of Care: No further plans for follow-up at this time, but will remain available for further support as patient/family needs/desires.    Yumiko Kelley M.Div.      Office: [phone] (for non-urgent requests)  Please Vocera or page through [facility] for time-sensitive requests

## 2024-10-14 NOTE — CONSULTS
Care Management Initial Consult    General Information  Assessment completed with: Children,    Type of CM/SW Visit: Initial Assessment    Primary Care Provider verified and updated as needed: Yes   Readmission within the last 30 days: no previous admission in last 30 days      Reason for Consult: discharge planning  Advance Care Planning:            Communication Assessment  Patient's communication style: spoken language (non-English) (Hungarian)    Hearing Difficulty or Deaf: yes   Wear Glasses or Blind: no    Cognitive  Cognitive/Neuro/Behavioral: WDL                      Living Environment:   People in home: child(uma), adult, child(uma), dependent     Current living Arrangements: house      Able to return to prior arrangements: yes       Family/Social Support:  Care provided by: child(uma)  Provides care for: no one, unable/limited ability to care for self  Marital Status:   Support system: Children          Description of Support System: Supportive, Involved         Current Resources:   Patient receiving home care services: No        Community Resources: None  Equipment currently used at home: cane, straight  Supplies currently used at home: None    Employment/Financial:  Employment Status: retired        Financial Concerns: none   Referral to Financial Worker: No       Does the patient's insurance plan have a 3 day qualifying hospital stay waiver?  No    Lifestyle & Psychosocial Needs:  Social Determinants of Health     Food Insecurity: Low Risk  (10/13/2024)    Food Insecurity     Within the past 12 months, did you worry that your food would run out before you got money to buy more?: No     Within the past 12 months, did the food you bought just not last and you didn t have money to get more?: No   Depression: Not at risk (1/17/2019)    PHQ-2     PHQ-2 Score: 0   Housing Stability: High Risk (10/13/2024)    Housing Stability     Do you have housing? : No     Are you worried about losing your housing?: No    Tobacco Use: High Risk (9/26/2024)    Patient History     Smoking Tobacco Use: Some Days     Smokeless Tobacco Use: Unknown     Passive Exposure: Not on file   Financial Resource Strain: Low Risk  (10/13/2024)    Financial Resource Strain     Within the past 12 months, have you or your family members you live with been unable to get utilities (heat, electricity) when it was really needed?: No   Alcohol Use: Not on file   Transportation Needs: Low Risk  (10/13/2024)    Transportation Needs     Within the past 12 months, has lack of transportation kept you from medical appointments, getting your medicines, non-medical meetings or appointments, work, or from getting things that you need?: No   Physical Activity: Not on file   Interpersonal Safety: Low Risk  (10/13/2024)    Interpersonal Safety     Do you feel physically and emotionally safe where you currently live?: Yes     Within the past 12 months, have you been hit, slapped, kicked or otherwise physically hurt by someone?: No     Within the past 12 months, have you been humiliated or emotionally abused in other ways by your partner or ex-partner?: No   Stress: Not on file   Social Connections: Not on file   Health Literacy: Not on file       Functional Status:  Prior to admission patient needed assistance:   Dependent ADLs:: Ambulation-walker, Ambulation-cane, Bathing, Dressing, Toileting  Dependent IADLs:: Cleaning, Cooking, Laundry, Shopping, Meal Preparation, Medication Management, Money Management, Transportation       Mental Health Status:          Chemical Dependency Status:  Chemical Dependency Status: No Current Concerns             Values/Beliefs:  Spiritual, Cultural Beliefs, Rastafari Practices, Values that affect care:    Description of Beliefs that Will Affect Care: no answer            Discussed  Partnership in Safe Discharge Planning  document with patient/family: NA    Additional Information:  GUADALUPE met with patient and daughter, who was present at  bedside. SW introduced self and CM role. Used [facility]  over the phone throughout visit. Patient lives in a house with 4 other family members (children and grand children). Patient utilizes a cane at baseline and family supports with all I/ADLs. Anticipate return home to prior living environment at discharge.     Contacts:   Марина Short RN, Clinic Care Coordination ph: [phone]     Next Steps: Following for therapy recommendations to assist with safe discharge planning.     SADIA Mix at 9:51 AM on 10/14/24

## 2024-10-14 NOTE — PROGRESS NOTES
Pt admitted to the unit this afternoon around 1600. [facility]  called and the assessment was wylie with the pt and daughter who was at bedside.  ID [id number] Soraya.    It was noted that the pt was NOT in isolation as this was lifted by the pulmonary intensivist.    ID saw pt this evening.    Pt via  is A/O x 4  Neuro's intact  Mg-1.8  K+-3.9  Passed on to oncoming RN to finish admission assessment and documentation.This writer at time of admission was unable to complete the full head to toe skin assessment.

## 2024-10-14 NOTE — PROGRESS NOTES
[facility]    Medicine Progress Note - Hospitalist Service    Date of Admission:  10/13/2024    Assessment & Plan   Doug Davidson is a 64 year old female with a medical history of nicotine use, recent positive QuantiFERON test, remote history of Strongyloides infection, type II DM, depression, hypertension, intermittent vertigo/dizziness who is admitted due to ongoing weakness from possible acute hynatremia vd from another cause     Status Post Fall  -no major injury  -Likely related to dizziness, though the underlying cause of the dizziness remains unclear. OT eval.   -tele monitoring. No arrhythmia.  - CTA neck is negative.  - TTE is WNL with EF 55-60%.   -D-dimer mildly elevated, nonspecific and no tachycarida, tachypnea, , no cp/sob, no leg edema to suggest embolic causes.   -Hyponatremia may be contributing.     Acute Hyponatremia  -Likely multifactorial, especially HCTZ.  -1L saline bolus already given by the ER.   -Continue to monitor sodium closely with targeted correction of 0.5 mEq/L per hour.   -Checked magnesium, TSH, and random cortisol levels to rule out other causes.   -Pt's on hydrochlorothiazide, likely contributing to hyponatremia. Will hold for now    Chronic Vertigo/Dizziness  -Etiology: Unknown etiology; could be related to B12 deficiency, cardiac, or vestibular causes. Tierra reports it it just started. ?? Some language barrier with communication.   Plan of Care: Check B12 levels,  TTE WNL,  -MRI of brain to r/o central dizziness due to CVA of cerebellum. But pt/family refused.   -meclizine prn      Positive TB Test/Latent TB  -Etiology: Positive QuantiFERON test, concern for latent tuberculosis. Noted she had the BCG scar but positive less likely related.   -Consult infectious disease (ID).  - CT with granulomatous disease not concerning for TB.   -There is concern that the granulomatous reaction noted in the right lung may be related to Strongyloides infection, which  also affects the lungs, GI tract, and liver.  -pulmonology have been consulted: ok not to isolate. Pulm signed off.     History of Strongyloides Infection  -Risk of recurrence if not properly treated.  -Granulomatous appearance in the right lung may be related to Strongyloides infection.   -Infectious disease (ID) consulted for input and management.    Type II Diabetes Mellitus  -Accu-checks for blood glucose monitoring and sliding scale insulin as needed.  -hgb A1c 6.5     Major Depression  Etiology: Pre-existing condition.  Plan of Care: Resume outpatient psychiatric care and continue current medications. Resume trazodone and zoloft. Also resume sequel. Will decrease doses of seroquel and trazodone. Check EKG for QT prolongation     Gastritis/GERD/History of H. Pylori  Etiology: Chronic gastric issues, history of H. pylori infection.  Plan of Care: Continue proton pump inhibitor (PPI) therapy. The a.m. team will follow up.     Other Medical Problems  Plan of Care: Management of the patient's other medical issues remains unchanged.             Diet: Combination Diet Moderate Consistent Carb (60 g CHO per Meal) Diet    DVT Prophylaxis: Pneumatic Compression Devices  Montes Catheter: Not present  Lines: None     Cardiac Monitoring: ACTIVE order. Indication: Tachyarrhythmias, acute (48 hours)  Code Status: Full Code      Clinically Significant Risk Factors         # Hyponatremia: Lowest Na = 123 mmol/L in last 2 days, will monitor as appropriate  # Hypochloremia: Lowest Cl = 88 mmol/L in last 2 days, will monitor as appropriate          # Hypertension: Noted on problem list          # DMII: A1C = 6.5 % (Ref range: 0.0 - 5.6 %) within past 6 months, PRESENT ON ADMISSION             Disposition Plan     Medically Ready for Discharge: Anticipated Tomorrow    Barrier: low na, dizziness await OT/PT eval; ID consult         Alexx Bridges MD  Hospitalist Service  [facility]  Securely message with  Karla (more info)  Text page via [facility] Paging/Directory   ______________________________________________________________________    Interval History   Feeling better, still dizzy but close to her baseline, general weakness, no cough, no f/c, no cp/sob, no/v but poor appetite     Physical Exam   Vital Signs: Temp: 99.4  F (37.4  C) Temp src: Oral BP: 99/57 Pulse: 87   Resp: 16 SpO2: 93 % O2 Device: None (Room air)    Weight: 132 lbs 11.47 oz    General.  Awake alert oriented not in acute distress.  HEENT.  Pupils equal round react to light, anicteric, EOM intact.  Neck supple no JVD.  CVS regular rhythm no murmur gallops.  Lungs.  Clear to auscultation bilateral no wheezing or rales.  Abdomen.  Soft nontender bowel sounds present.  Extremities.  No edema no calf tenderness.  Neurological.  Awake and alert. No focal deficit. General weakness  Skin no rash. No pallor.  Psych. Normal mood.      Medical Decision Making       55 MINUTES SPENT BY ME on the date of service doing chart review, history, exam, documentation & further activities per the note.      Data     I have personally reviewed the following data over the past 24 hrs:    N/A  \   N/A   / N/A     133 (L) 98 14.7 /  116 (H)   3.9; 4.0 24 0.84 \     Trop: <6 BNP: N/A       Imaging results reviewed over the past 24 hrs:   Recent Results (from the past 24 hour(s))   Echocardiogram Complete   Result Value    LVEF  55-60%    Narrative    [id number]  [id number]  [id number]  [id number]^GILBERTO^LESA^[place] [address]     Name: ALESHIA CONNORS  MRN: [id number]  : 1960  Study Date: 10/13/2024 02:06 PM  Age: 64 yrs  Gender: Female  Patient Location: [facility]  Reason For Study: Dizziness  Ordering Physician: LESA MACIAS  Performed By: BETO     BSA: 1.6 m2  Height: 60 in  Weight: 137 lb  HR: 77  BP: 115/69 mmHg  ______________________________________________________________________________  Procedure  Complete Portable Echo  Adult.  ______________________________________________________________________________  Interpretation Summary     The left ventricle is normal in size.  The visual ejection fraction is 55-60%.  No regional wall motion abnormalities noted.  Normal right ventricle size and systolic function.  IVC diameter <2.1 cm collapsing >50% with sniff suggests a normal RA pressure  of 3 mmHg.  Sinus rhythm was noted.  No hemodynamically significant valvular abnormalities on 2D or color flow  imaging. There is no comparison study available.  ______________________________________________________________________________  Left Ventricle  The left ventricle is normal in size. There is normal left ventricular wall  thickness. The visual ejection fraction is 55-60%. Diastolic Doppler findings  (E/E' ratio and/or other parameters) suggest left ventricular filling  pressures are indeterminate. No regional wall motion abnormalities noted. A  false chord is noted (normal variant).     Right Ventricle  Normal right ventricle size and systolic function. TAPSE is normal, which is  consistent with normal right ventricular systolic function.     Atria  The left atrium is borderline dilated. Right atrial size is normal.     Mitral Valve  Mitral valve leaflets appear normal. There is mild (1+) mitral regurgitation.  There is no mitral valve stenosis.     Tricuspid Valve  Tricuspid valve leaflets appear normal. There is mild (1+) tricuspid  regurgitation. The right ventricular systolic pressure is approximated at  23mmHg plus the right atrial pressure. There is no tricuspid stenosis.     Aortic Valve  The aortic valve is trileaflet. No aortic regurgitation is present. No aortic  stenosis is present.     Pulmonic Valve  The pulmonic valve is not well visualized. There is no pulmonic valvular  regurgitation. There is no pulmonic valvular stenosis.     Vessels  Normal size ascending aorta. IVC diameter <2.1 cm collapsing >50% with sniff  suggests a  normal RA pressure of 3 mmHg.     Pericardium  There is no pericardial effusion.     Rhythm  Sinus rhythm was noted.  ______________________________________________________________________________  MMode/2D Measurements & Calculations     IVSd: 1.0 cm  LVIDd: 5.1 cm  LVIDs: 3.5 cm  LVPWd: 0.89 cm  FS: 30.5 %  LV mass(C)d: 175.1 grams  LV mass(C)dI: 110.1 grams/m2  Ao root diam: 3.3 cm  LA dimension: 3.7 cm  asc Aorta Diam: 3.1 cm  LA/Ao: 1.1  LVOT diam: 2.1 cm  LVOT area: 3.5 cm2  Ao root diam index Ht(cm/m): 2.2  Ao root diam index BSA (cm/m2): 2.1  Asc Ao diam index BSA (cm/m2): 2.0  Asc Ao diam index Ht(cm/m): 2.0  EF Biplane: 55.5 %  LA Volume (BP): 42.8 ml     LA Volume Index (BP): 26.9 ml/m2  LA Volume Indexed (AL/bp): 28.5 ml/m2  RV Base: 3.3 cm  RWT: 0.35  TAPSE: 2.0 cm     Time Measurements  MM HR: 77.0 BPM     Doppler Measurements & Calculations  MV E max rolando: 61.3 cm/sec  MV A max rolando: 79.1 cm/sec  MV E/A: 0.77  MV dec slope: 260.0 cm/sec2  MV dec time: 0.24 sec  Ao V2 max: 105.0 cm/sec  Ao max P.0 mmHg  Ao V2 mean: 63.8 cm/sec  Ao mean P.0 mmHg  Ao V2 VTI: 18.8 cm  HECTOR(I,D): 2.9 cm2  HECTOR(V,D): 2.7 cm2  LV V1 max P.8 mmHg  LV V1 max: 83.0 cm/sec  LV V1 VTI: 16.0 cm  SV(LVOT): 55.4 ml  SI(LVOT): 34.9 ml/m2  PA acc time: 0.09 sec  TR max rolando: 241.0 cm/sec  TR max P.2 mmHg  AV Rolando Ratio (DI): 0.79  HECTOR Index (cm2/m2): 1.9  E/E': 6.7  E/E' av.6  Lateral E/e': 6.7     Medial E/e': 12.5  Peak E' Rolando: 9.1 cm/sec  RV S Rolando: 14.6 cm/sec     ______________________________________________________________________________  Report approved by: Erna Liu 10/13/2024 03:44 PM

## 2024-10-15 ENCOUNTER — TELEPHONE (OUTPATIENT)
Dept: FAMILY MEDICINE | Facility: CLINIC | Age: 64
End: 2024-10-15
Payer: COMMERCIAL

## 2024-10-15 ENCOUNTER — APPOINTMENT (OUTPATIENT)
Dept: PHYSICAL THERAPY | Facility: HOSPITAL | Age: 64
End: 2024-10-15
Payer: COMMERCIAL

## 2024-10-15 VITALS
OXYGEN SATURATION: 94 % | BODY MASS INDEX: 22.66 KG/M2 | HEART RATE: 74 BPM | RESPIRATION RATE: 18 BRPM | DIASTOLIC BLOOD PRESSURE: 57 MMHG | TEMPERATURE: 98.5 F | SYSTOLIC BLOOD PRESSURE: 94 MMHG | WEIGHT: 132.72 LBS | HEIGHT: 64 IN
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
## 2024-10-15 LAB
ANION GAP SERPL CALCULATED.3IONS-SCNC: 12 MMOL/L (ref 7–15)
BUN SERPL-MCNC: 17.3 MG/DL (ref 8–23)
CALCIUM SERPL-MCNC: 8.8 MG/DL (ref 8.8–10.4)
CHLORIDE SERPL-SCNC: 98 MMOL/L (ref 98–107)
CREAT SERPL-MCNC: 0.99 MG/DL (ref 0.51–0.95)
EGFRCR SERPLBLD CKD-EPI 2021: 63 ML/MIN/1.73M2
GLUCOSE BLDC GLUCOMTR-MCNC: 106 MG/DL (ref 70–99)
GLUCOSE BLDC GLUCOMTR-MCNC: 121 MG/DL (ref 70–99)
GLUCOSE SERPL-MCNC: 103 MG/DL (ref 70–99)
HCO3 SERPL-SCNC: 25 MMOL/L (ref 22–29)
HOLD SPECIMEN: NORMAL
MAGNESIUM SERPL-MCNC: 2.3 MG/DL (ref 1.7–2.3)
POTASSIUM SERPL-SCNC: 4.2 MMOL/L (ref 3.4–5.3)
SODIUM SERPL-SCNC: 135 MMOL/L (ref 135–145)

## 2024-10-15 PROCEDURE — G0378 HOSPITAL OBSERVATION PER HR: HCPCS

## 2024-10-15 PROCEDURE — 82962 GLUCOSE BLOOD TEST: CPT

## 2024-10-15 PROCEDURE — 82310 ASSAY OF CALCIUM: CPT | Performed by: INTERNAL MEDICINE

## 2024-10-15 PROCEDURE — 250N000013 HC RX MED GY IP 250 OP 250 PS 637: Performed by: INTERNAL MEDICINE

## 2024-10-15 PROCEDURE — 83735 ASSAY OF MAGNESIUM: CPT | Performed by: INTERNAL MEDICINE

## 2024-10-15 PROCEDURE — 250N000011 HC RX IP 250 OP 636: Performed by: INTERNAL MEDICINE

## 2024-10-15 PROCEDURE — 96376 TX/PRO/DX INJ SAME DRUG ADON: CPT

## 2024-10-15 PROCEDURE — 80048 BASIC METABOLIC PNL TOTAL CA: CPT | Performed by: INTERNAL MEDICINE

## 2024-10-15 PROCEDURE — 36415 COLL VENOUS BLD VENIPUNCTURE: CPT | Performed by: INTERNAL MEDICINE

## 2024-10-15 PROCEDURE — 97116 GAIT TRAINING THERAPY: CPT | Mod: GP

## 2024-10-15 PROCEDURE — 97530 THERAPEUTIC ACTIVITIES: CPT | Mod: GP

## 2024-10-15 PROCEDURE — 99239 HOSP IP/OBS DSCHRG MGMT >30: CPT | Performed by: INTERNAL MEDICINE

## 2024-10-15 RX ADMIN — ROSUVASTATIN 10 MG: 10 TABLET, FILM COATED ORAL at 08:56

## 2024-10-15 RX ADMIN — GABAPENTIN 100 MG: 100 CAPSULE ORAL at 08:57

## 2024-10-15 RX ADMIN — SERTRALINE HYDROCHLORIDE 50 MG: 50 TABLET ORAL at 08:57

## 2024-10-15 RX ADMIN — PANTOPRAZOLE SODIUM 40 MG: 40 TABLET, DELAYED RELEASE ORAL at 06:36

## 2024-10-15 RX ADMIN — SERTRALINE HYDROCHLORIDE 100 MG: 100 TABLET ORAL at 08:57

## 2024-10-15 RX ADMIN — FAMOTIDINE 20 MG: 10 INJECTION, SOLUTION INTRAVENOUS at 09:00

## 2024-10-15 RX ADMIN — FLUTICASONE PROPIONATE 1 SPRAY: 50 SPRAY, METERED NASAL at 08:57

## 2024-10-15 RX ADMIN — AMLODIPINE BESYLATE 5 MG: 5 TABLET ORAL at 08:57

## 2024-10-15 ASSESSMENT — ACTIVITIES OF DAILY LIVING (ADL)
ADLS_ACUITY_SCORE: 30
ADLS_ACUITY_SCORE: 29
ADLS_ACUITY_SCORE: 25
ADLS_ACUITY_SCORE: 30
ADLS_ACUITY_SCORE: 30
ADLS_ACUITY_SCORE: 25
ADLS_ACUITY_SCORE: 30

## 2024-10-15 NOTE — PROGRESS NOTES
Care Management Discharge Note    Discharge Date: 10/15/2024       Discharge Disposition: Home, Home Care    Discharge Services: None    Discharge DME: None    Discharge Transportation: family or friend will provide    Private pay costs discussed: Not applicable    Does the patient's insurance plan have a 3 day qualifying hospital stay waiver?  No    PAS Confirmation Code: NA  Patient/family educated on Medicare website which has current facility and service quality ratings: yes    Education Provided on the Discharge Plan: Yes  Persons Notified of Discharge Plans: patient and family   Patient/Family in Agreement with the Plan: yes    Handoff Referral Completed: No, handoff not indicated or clinically appropriate    Additional Information:  Patient discharging home to prior living environment with family. MD ordered home care services, referral sent to [facility] for OT/PT. Family to transport.      SADIA Mix at 8:58 AM on 10/15/24     Home care referral accepted by Cristi. Orders sent.

## 2024-10-15 NOTE — CARE PLAN
"PRIMARY DIAGNOSIS: \"GENERIC\" NURSING  OUTPATIENT/OBSERVATION GOALS TO BE MET BEFORE DISCHARGE:  ADLs back to baseline: No    Activity and level of assistance: Assist of 1    Pain status: Pain free.    Return to near baseline physical activity: No     Discharge Planner Nurse   Safe discharge environment identified: No  Barriers to discharge: Yes PT OT evaluation       Entered by: Veronika Crystal RN 10/15/2024 5:31 AM     Please review provider order for any additional goals.   Nurse to notify provider when observation goals have been met and patient is ready for discharge.    Patient alert and oriented x 4. Denied pain. Neuro intact. Family at bedside that help with the translation. Refused bed alarm for now, education given. Call light within reach.     Tele shows NSR.  "

## 2024-10-15 NOTE — PROGRESS NOTES
Physical Therapy Discharge Summary    Reason for therapy discharge:    Discharged to home with home care.    Progress towards therapy goal(s). See goals on Care Plan in [facility] electronic health record for goal details.  Goals partially met.  Barriers to achieving goals:   discharge from facility.    Therapy recommendation(s):    Further recommended therapy is related to documented deficits, and is necessary to maximize functional independence in order for patient to return to prior level of function.      Racheal Alvarez, NICHOLAS 10/15/2024

## 2024-10-15 NOTE — TELEPHONE ENCOUNTER
[phone] regarding an established patient with [facility].     Requesting orders from: Suzanne Thayer  Provider is following patient: Yes  Is this a 60-day recertification request?  No    Orders Requested    Physical Therapy  Request for delay in care, service is not able to be provided within same scheduled day.         Occupational Therapy  Request for delay in care, service is not able to be provided within same scheduled day.     Pt has been rescheduled for OT/PT on 10/21/24.  Unable to accommodate due to staffing; unable to staff.    Information was gathered and will be sent to provider for review.  RN will contact Home Care with information after provider review.    Confirmed ok to leave a detailed message with call back.  Contact information confirmed and updated as needed.    Patience Will RN

## 2024-10-15 NOTE — DISCHARGE SUMMARY
[facility]    Discharge Summary  Hospitalist    Date of Admission:  10/13/2024  Date of Discharge:  10/15/2024  Discharging Provider: Alexx Bridges MD  Date of Service (when I saw the patient): 10/15/24    Discharge Diagnoses   Fall  Chronic vertigo, vestibular?  Hyponatremia due to hydrochlorothiazide?  Hypotension  Positive TB test, ?Latent TB    History of Present Illness   Doug Davidson is an 64 year old female who presented with fall.    Hospital Course   Doug Davidson is a 64 year old female with a medical history of nicotine use, recent positive QuantiFERON test, remote history of Strongyloides infection, type II DM, depression, hypertension, intermittent vertigo/dizziness who is admitted due to ongoing weakness and fall.      Status Post Fall  -no major injury  -Likely related to vertigo/dizziness  -tele monitoring. No arrhythmia.  - CTA neck is negative.  - TTE is WNL with EF 55-60%.   -D-dimer mildly elevated, nonspecific and no tachycarida, tachypnea, , no cp/sob, no leg edema to suggest embolic causes.   -Hyponatremia may be contributing.     Acute Hyponatremia  -Likely multifactorial, especially HCTZ.  -1L saline bolus already given by the ER.   -Continue to monitor sodium closely with targeted correction of 0.5 mEq/L per hour.   -normal magnesium and random cortisol   -TSH elevated but T4 normal  -Stop hydrochlorothiazide and Na back to normal     Chronic Vertigo/Dizziness  -most likely vestibular causes.   -normal B12 levels,  TTE WNL,  -Recommended MRI of brain to r/o central dizziness due to CVA of cerebellum. But pt/family refused.   -meclizine prn   -outpatient pt/ot  -Outpatient ENT and neurology consultation as needed     Positive TB Test/Latent TB  -Positive QuantiFERON test, concern for latent tuberculosis. Noted she had the BCG scar but positive less likely related.   -Consult infectious disease (ID).  - CT with granulomatous disease not concerning for TB.    -There is concern that the granulomatous reaction noted in the right lung may be related to Strongyloides infection, which also affects the lungs, GI tract, and liver.  -pulmonology have been consulted: ok not to isolate. Pulm signed off.  -ID: Ok to discharge, and no need for treatment for TB. Follow up with ID in 2-3 months and ID would recheck her QFT.     HTN  -BP soft and 94/57   -discontinue hydrochlorothiazide  -hold Norvasc until bp electated     History of Strongyloides Infection  -Risk of recurrence if not properly treated.  -Granulomatous appearance in the right lung may be related to Strongyloides infection.   -Infectious disease (ID) consulted for input and management. See ID note.    Type II Diabetes Mellitus  -Accu-checks for blood glucose monitoring and sliding scale insulin as needed.  -hgb A1c 6.5     Major Depression  Etiology: Pre-existing condition.  Plan of Care: Resume outpatient psychiatric care and continue current medications. Resume trazodone and zoloft. Also resume sequel. Will decrease doses of seroquel and trazodone.      Gastritis/GERD/History of H. Pylori  Etiology: Chronic gastric issues, history of H. pylori infection.  Plan of Care: Continue proton pump inhibitor (PPI) therapy.      Other Medical Problems  Plan of Care: Management of the patient's other medical issues remains unchanged.          Alexx Bridges MD    Significant Results and Procedures   See below    Pending Results   These results will be followed up by PCP  Unresulted Labs Ordered in the Past 30 Days of this Admission       Date and Time Order Name Status Description    10/15/2024  6:10 AM Extra Purple Top EDTA (LAB USE ONLY) In process             Code Status   Full Code       [facility]  Awake alert oriented not in acute distress.  HEENT.  Pupils equal round react to light, anicteric, EOM intact.  Neck supple no JVD.  CVS regular rhythm no murmur gallops.  Lungs.   Clear to auscultation bilateral no wheezing or rales.  Abdomen.  Soft nontender bowel sounds present.  Extremities.  No edema no calf tenderness.  Neurological.  Awake and alert. No focal deficit; general weakness; unsteady on feet with dizziness  Skin no rash. No pallor.  Psych. Normal mood.      Discharge Disposition   Discharged to home with therapy  Condition at discharge: Fair    Consultations This Hospital Stay   INFECTIOUS DISEASES IP CONSULT  PULMONARY IP CONSULT  OCCUPATIONAL THERAPY ADULT IP CONSULT  CARE MANAGEMENT / SOCIAL WORK IP CONSULT  PHYSICAL THERAPY ADULT IP CONSULT  VESTIBULAR PHYSICAL THERAPY IP CONSULT  SPIRITUAL HEALTH SERVICES IP CONSULT    Time Spent on this Encounter   I, Alexx Bridges MD, personally saw the patient today and spent greater than 30 minutes discharging this patient.    Discharge Orders      Primary Care - Care Coordination Referral      Home Care Referral      Reason for your hospital stay    Fall  Vertigo     Follow-up and recommended labs and tests     Follow up with primary care provider, Suzanne Thayer, within 7 days for hospital follow- up.  The following labs/tests are recommended: cbc, bmp.    Follow up with ENT and neurologist as needed     Activity    Your activity upon discharge: activity as tolerated; fall precaution; follow pt/ot instructions     When to contact your care team    Call your primary doctor if you have any of the following: any concerns.     Diet    Follow this diet upon discharge: Current Diet:Orders Placed This Encounter      Combination Diet Moderate Consistent Carb (60 g CHO per Meal) Diet     Discharge Medications   Current Discharge Medication List        CONTINUE these medications which have NOT CHANGED    Details   ammonium lactate (AMLACTIN) 12 % external cream Apply liberally to dry skin daily.      Cholecalciferol (VITAMIN D) 50 MCG (2000 UT) CAPS       fluticasone (FLONASE) 50 MCG/ACT nasal spray 2 sprays.      gabapentin (NEURONTIN)  100 MG capsule Take 100 mg by mouth.      hydrocortisone (CORTAID) 1 % external cream Apply topically 2 times daily as needed for rash or itching.      meclizine (ANTIVERT) 25 MG tablet Take 1 tablet (25 mg) by mouth 3 times daily as needed for dizziness.  Qty: 30 tablet, Refills: 0    Associated Diagnoses: Dizziness      omeprazole (PRILOSEC) 20 MG DR capsule Take 20 mg by mouth.      QUEtiapine (SEROQUEL) 50 MG tablet Take 1 tablet by mouth at bedtime.      rosuvastatin (CRESTOR) 10 MG tablet Take 1 tablet (10 mg) by mouth daily.  Qty: 30 tablet, Refills: 0    Associated Diagnoses: High cholesterol      SENEXON-S 8.6-50 MG tablet Take 1 tablet by mouth 2 times daily      !! sertraline (ZOLOFT) 100 MG tablet Take 1 tablet (100 mg) by mouth daily.  Qty: 30 tablet, Refills: 0    Comments: Take 100 mg plus 50 mg tablet for a total of 150 mg  Associated Diagnoses: CRISTINO (generalized anxiety disorder)      !! sertraline (ZOLOFT) 50 MG tablet Take 1 tablet (50 mg) by mouth daily.  Qty: 30 tablet, Refills: 0    Comments: Take 100 mg plus 50 mg tablet for a total of 150 mg  Associated Diagnoses: CRISTINO (generalized anxiety disorder)      traZODone (DESYREL) 50 MG tablet Take 1 tablet (50 mg) by mouth at bedtime.  Qty: 30 tablet, Refills: 0    Associated Diagnoses: Other insomnia       !! - Potential duplicate medications found. Please discuss with provider.        STOP taking these medications       amLODIPine (NORVASC) 5 MG tablet Comments:   Reason for Stopping:         hydrochlorothiazide (MICROZIDE) 12.5 MG capsule Comments:   Reason for Stopping:           Low bp. Resume Norvasc if blood pressure elevated  Allergies   No Known Allergies  Data   Most Recent 3 CBC's:  Recent Labs   Lab Test 10/13/24  0630 10/13/24  0252 10/03/24  1143   WBC 7.1 6.3 8.3   HGB 12.0 11.6* 11.0*   MCV 84 84 87   * 483* 495*      Most Recent 3 BMP's:  Recent Labs   Lab Test 10/15/24  0836 10/15/24  0617 10/14/24  2029 10/14/24  0818  10/14/24  0725 10/13/24  1608 10/13/24  1331   NA  --  135  --   --  133*  --  132*   POTASSIUM  --  4.2  --   --  4.0  3.9  --  3.9   CHLORIDE  --  98  --   --  98  --  97*   CO2  --  25  --   --  24  --  22   BUN  --  17.3  --   --  14.7  --  7.2*   CR  --  0.99*  --   --  0.84  --  0.58   ANIONGAP  --  12  --   --  11  --  13   KANE  --  8.8  --   --  9.0  --  8.9   * 103* 182*   < > 119*   < > 116*    < > = values in this interval not displayed.     Most Recent 2 LFT's:  Recent Labs   Lab Test 10/13/24  0630 10/03/24  1143   AST 27 32   ALT 11 12   ALKPHOS 156* 122   BILITOTAL 0.3 0.2     Most Recent INR's and Anticoagulation Dosing History:  Anticoagulation Dose History          Latest Ref Rng & Units 5/1/2022   Recent Dosing and Labs   INR 0.85 - 1.15 1.03       Details                 Most Recent 3 Troponin's:No lab results found.  Most Recent Cholesterol Panel:  Recent Labs   Lab Test 10/03/24  1143   CHOL 177   LDL 98   HDL 34*   TRIG 227*     Most Recent 6 Bacteria Isolates From Any Culture (See EPIC Reports for Culture Details):No lab results found.  Most Recent TSH, T4 and A1c Labs:  Recent Labs   Lab Test 10/13/24  0252 10/03/24  1143   TSH 4.22* 1.25   T4 1.05  --    A1C  --  6.5*     Results for orders placed or performed during the hospital encounter of 10/13/24   CTA Head Neck with Contrast    Narrative    EXAM: CTA HEAD NECK W CONTRAST  LOCATION: [facility]  DATE: 10/13/2024    INDICATION: tingling in bilateral hands  COMPARISON: Head CT dated 12/08/2021 and MRI of the brain dated 05/01/2022  CONTRAST: 75ml isovue 370  TECHNIQUE: Head and neck CT angiogram with IV contrast. Noncontrast head CT followed by axial helical CT images of the head and neck vessels obtained during the arterial phase of intravenous contrast administration. Axial 2D reconstructed images and   multiplanar 3D MIP reconstructed images of the head and neck vessels were performed by the  technologist. Dose reduction techniques were used. All stenosis measurements made according to NASCET criteria unless otherwise specified.    FINDINGS:   NONCONTRAST HEAD CT:   INTRACRANIAL CONTENTS: No intracranial hemorrhage, extraaxial collection, or mass effect.  No CT evidence of acute infarct. Normal parenchymal attenuation. Normal ventricles and sulci.     VISUALIZED ORBITS/SINUSES/MASTOIDS: No intraorbital abnormality. No paranasal sinus mucosal disease. No middle ear or mastoid effusion.    BONES/SOFT TISSUES: No acute abnormality.    HEAD CTA:  ANTERIOR CIRCULATION: No stenosis/occlusion, aneurysm, or high flow vascular malformation. Developmentally hypoplastic right A1 anterior cerebral artery segment.    POSTERIOR CIRCULATION: No stenosis/occlusion, aneurysm, or high flow vascular malformation. Balanced vertebral arteries supply a normal basilar artery.     DURAL VENOUS SINUSES: Expected enhancement of the major dural venous sinuses.    NECK CTA:  RIGHT CAROTID: Mild atherosclerotic plaque at the bifurcation. No measurable stenosis or dissection.    LEFT CAROTID: Mild atheromatous plaque at the bifurcation. No measurable stenosis or dissection.    VERTEBRAL ARTERIES: No focal stenosis or dissection. Balanced vertebral arteries.    AORTIC ARCH: Classic aortic arch anatomy with no significant stenosis at the origin of the great vessels.    NONVASCULAR STRUCTURES: Unremarkable.      Impression    IMPRESSION:   HEAD CT:  1.  No acute intracranial process.    HEAD CTA:   1.  Normal CTA [place].    NECK CTA:  1.  No hemodynamically significant stenosis in the neck vessels.   2.  No evidence for dissection.   Chest CT w/o contrast    Narrative    EXAM: CT CHEST W/O CONTRAST  LOCATION: [facility]  DATE: 10/13/2024    INDICATION: Weakness. Evaluate for tuberculosis.  COMPARISON: None.  TECHNIQUE: CT chest without IV contrast. Multiplanar reformats were obtained. Dose reduction  techniques were used.  CONTRAST: None.    FINDINGS:   LUNGS AND PLEURA: Mild atelectasis is seen in the bilateral lower lobes. Scattered 2 mm calcified granulomas bilaterally. No confluent consolidation, pleural effusion, pneumothorax or architectural distortion.    MEDIASTINUM/AXILLAE: No lymphadenopathy. No thoracic aortic aneurysm. Heart size is normal without pericardial effusion.    CORONARY ARTERY CALCIFICATION: None.    UPPER ABDOMEN: No acute findings.    MUSCULOSKELETAL: No significant osseous abnormality.        Impression    IMPRESSION:     1.  Scattered bilateral calcified granulomas compatible with old granulomatous disease. No significant intrathoracic abnormality, including pulmonary findings of tuberculosis.     Echocardiogram Complete     Value    LVEF  55-60%    [facility]    [id number]  [id number]  [id number]  [id number]^GILBERTO^LESA^[place] [address]     Name: ALESHIA CONNORS  MRN: [id number]  : 1960  Study Date: 10/13/2024 02:06 PM  Age: 64 yrs  Gender: Female  Patient Location: [facility]  Reason For Study: Dizziness  Ordering Physician: LESA MACIAS  Performed By: BETO     BSA: 1.6 m2  Height: 60 in  Weight: 137 lb  HR: 77  BP: 115/69 mmHg  ______________________________________________________________________________  Procedure  Complete Portable Echo Adult.  ______________________________________________________________________________  Interpretation Summary     The left ventricle is normal in size.  The visual ejection fraction is 55-60%.  No regional wall motion abnormalities noted.  Normal right ventricle size and systolic function.  IVC diameter <2.1 cm collapsing >50% with sniff suggests a normal RA pressure  of 3 mmHg.  Sinus rhythm was noted.  No hemodynamically significant valvular abnormalities on 2D or color flow  imaging. There is no comparison study  available.  ______________________________________________________________________________  Left Ventricle  The left ventricle is normal in size. There is normal left ventricular wall  thickness. The visual ejection fraction is 55-60%. Diastolic Doppler findings  (E/E' ratio and/or other parameters) suggest left ventricular filling  pressures are indeterminate. No regional wall motion abnormalities noted. A  false chord is noted (normal variant).     Right Ventricle  Normal right ventricle size and systolic function. TAPSE is normal, which is  consistent with normal right ventricular systolic function.     Atria  The left atrium is borderline dilated. Right atrial size is normal.     Mitral Valve  Mitral valve leaflets appear normal. There is mild (1+) mitral regurgitation.  There is no mitral valve stenosis.     Tricuspid Valve  Tricuspid valve leaflets appear normal. There is mild (1+) tricuspid  regurgitation. The right ventricular systolic pressure is approximated at  23mmHg plus the right atrial pressure. There is no tricuspid stenosis.     Aortic Valve  The aortic valve is trileaflet. No aortic regurgitation is present. No aortic  stenosis is present.     Pulmonic Valve  The pulmonic valve is not well visualized. There is no pulmonic valvular  regurgitation. There is no pulmonic valvular stenosis.     Vessels  Normal size ascending aorta. IVC diameter <2.1 cm collapsing >50% with sniff  suggests a normal RA pressure of 3 mmHg.     Pericardium  There is no pericardial effusion.     Rhythm  Sinus rhythm was noted.  ______________________________________________________________________________  MMode/2D Measurements & Calculations     IVSd: 1.0 cm  LVIDd: 5.1 cm  LVIDs: 3.5 cm  LVPWd: 0.89 cm  FS: 30.5 %  LV mass(C)d: 175.1 grams  LV mass(C)dI: 110.1 grams/m2  Ao root diam: 3.3 cm  LA dimension: 3.7 cm  asc Aorta Diam: 3.1 cm  LA/Ao: 1.1  LVOT diam: 2.1 cm  LVOT area: 3.5 cm2  Ao root diam index Ht(cm/m): 2.2  Ao  root diam index BSA (cm/m2): 2.1  Asc Ao diam index BSA (cm/m2): 2.0  Asc Ao diam index Ht(cm/m): 2.0  EF Biplane: 55.5 %  LA Volume (BP): 42.8 ml     LA Volume Index (BP): 26.9 ml/m2  LA Volume Indexed (AL/bp): 28.5 ml/m2  RV Base: 3.3 cm  RWT: 0.35  TAPSE: 2.0 cm     Time Measurements  MM HR: 77.0 BPM     Doppler Measurements & Calculations  MV E max rolando: 61.3 cm/sec  MV A max rolando: 79.1 cm/sec  MV E/A: 0.77  MV dec slope: 260.0 cm/sec2  MV dec time: 0.24 sec  Ao V2 max: 105.0 cm/sec  Ao max P.0 mmHg  Ao V2 mean: 63.8 cm/sec  Ao mean P.0 mmHg  Ao V2 VTI: 18.8 cm  HECTOR(I,D): 2.9 cm2  HECTOR(V,D): 2.7 cm2  LV V1 max P.8 mmHg  LV V1 max: 83.0 cm/sec  LV V1 VTI: 16.0 cm  SV(LVOT): 55.4 ml  SI(LVOT): 34.9 ml/m2  PA acc time: 0.09 sec  TR max rolando: 241.0 cm/sec  TR max P.2 mmHg  AV Rolando Ratio (DI): 0.79  HECTOR Index (cm2/m2): 1.9  E/E': 6.7  E/E' av.6  Lateral E/e': 6.7     Medial E/e': 12.5  Peak E' Rolando: 9.1 cm/sec  RV S Rolando: 14.6 cm/sec     ______________________________________________________________________________  Report approved by: Erna Liu 10/13/2024 03:44 PM

## 2024-10-15 NOTE — PLAN OF CARE
Problem: Adult Inpatient Plan of Care  Goal: Plan of Care Review  Description: The Plan of Care Review/Shift note should be completed every shift.  The Outcome Evaluation is a brief statement about your assessment that the patient is improving, declining, or no change.  This information will be displayed automatically on your shift  note.  Outcome: Progressing     Problem: Comorbidity Management  Goal: Blood Glucose Levels Within Targeted Range  Intervention: Monitor and Manage Glycemia  Recent Flowsheet Documentation  Taken 10/13/2024 2324 by Glenda Danielson RN  Medication Review/Management: medications reviewed  Taken 10/13/2024 2000 by Glenda Danielson RN  Medication Review/Management: medications reviewed     Problem: Adult Inpatient Plan of Care  Goal: Absence of Hospital-Acquired Illness or Injury  Intervention: Identify and Manage Fall Risk  Recent Flowsheet Documentation  Taken 10/13/2024 2324 by Glenda Danielson RN  Safety Promotion/Fall Prevention:   activity supervised   lighting adjusted   nonskid shoes/slippers when out of bed   patient and family education  Taken 10/13/2024 2000 by Glenda Danielson RN  Safety Promotion/Fall Prevention:   activity supervised   lighting adjusted   nonskid shoes/slippers when out of bed   patient and family education       Goal Outcome Evaluation:     Pt's assessment/medications via [facility]  (see previous note) Admission completed. A/O x 4,  VSS, afebrile, Neuro intact, denies pain.  BGM TID and HS, pt refused bedtime medications.  Continent of B/B, uses call light appropriately,verbalized understanding to call to use call light for safety.  Uses BSC- SBA.  Uses cane at home.  ID to see today w/ .  Pulm signed off.  Bed alarm applied, frequent safety checks during shift.    No pain reported.                  
  Problem: Adult Inpatient Plan of Care  Goal: Plan of Care Review  Description: The Plan of Care Review/Shift note should be completed every shift.  The Outcome Evaluation is a brief statement about your assessment that the patient is improving, declining, or no change.  This information will be displayed automatically on your shift  note.  Outcome: Progressing  Goal: Absence of Hospital-Acquired Illness or Injury  Outcome: Progressing  Intervention: Prevent Skin Injury  Recent Flowsheet Documentation  Taken 10/15/2024 0054 by Veronika Crystal RN  Body Position: position changed independently  Intervention: Prevent and Manage VTE (Venous Thromboembolism) Risk  Recent Flowsheet Documentation  Taken 10/15/2024 0054 by Veronika Crystal RN  VTE Prevention/Management: SCDs off (sequential compression devices)  Goal: Optimal Comfort and Wellbeing  Outcome: Progressing     Problem: Pain Acute  Goal: Optimal Pain Control and Function  Outcome: Progressing   Goal Outcome Evaluation:                        
"Goal Outcome Evaluation:  PRIMARY DIAGNOSIS: \"GENERIC\" NURSING  OUTPATIENT/OBSERVATION GOALS TO BE MET BEFORE DISCHARGE:  ADLs back to baseline: No    Activity and level of assistance: Assist of 1    Pain status: Pain free.    Return to near baseline physical activity: No     Discharge Planner Nurse   Safe discharge environment identified: Yes  Barriers to discharge: Yes, PT OT review       Entered by: Veronika Crystal RN 10/15/2024 6:55 AM     Please review provider order for any additional goals.   Nurse to notify provider when observation goals have been met and patient is ready for discharge.       Patient alert and oriented x 4. Denied pain. Neuros intact.                "
Goal Outcome Evaluation:       Patient denies pain this shift.  Tolerating a regular diet.  Complains of occasional dizziness when up.  Family at bedside.  [facility]  used for all communication.  Bed alarm in place.                   
Goal Outcome Evaluation:     Pt Lithuanian speaking. A&Ox4, VSS on room air. Having 3/10 lower back pain but declines PRNs. NSR on tele. Ambulated the halls this evening. Moves with assist x1 walker and gait belt. Family at the bedside. Bed alarm on, call light within reach.                    
Occupational Therapy Discharge Summary    Reason for therapy discharge:    Discharged to home with home therapy.    Progress towards therapy goal(s). See goals on Care Plan in [facility] electronic health record for goal details.  Goals partially met.  Barriers to achieving goals:   discharge from facility.    Therapy recommendation(s):    Continued therapy is recommended.  Rationale/Recommendations:  Cont HC OT to facilitate safe discharge, continue to improve pt's independence in home environment.    Mariann Rodriguez OTR/L. 10/15/2024, 10:29 AM    
PRIMARY DIAGNOSIS: vertigo  OUTPATIENT/OBSERVATION GOALS TO BE MET BEFORE DISCHARGE:  ADLs back to baseline: No    Activity and level of assistance: Assist of 2 with walker and gait belt.     Pain status: Pain free.    Return to near baseline physical activity: No     Discharge Planner Nurse   Safe discharge environment identified: will anticipate to return home  Barriers to discharge: low na, dizziness await OT/PT eval; ID consult        Entered by: Khushboo Harrington RN 10/14/2024 5:44 PM     Please review provider order for any additional goals.   Nurse to notify provider when observation goals have been met and patient is ready for discharge.Goal Outcome Evaluation:                        
yes

## 2024-10-15 NOTE — PROGRESS NOTES
[facility]      OUTPATIENT OCCUPATIONAL THERAPY  EVALUATION  PLAN OF TREATMENT FOR OUTPATIENT REHABILITATION  (COMPLETE FOR INITIAL CLAIMS ONLY)  Patient's Last Name, First Name, M.I.  YOB: 1960  Doug Davidson                          Provider's Name  [facility] Medical Record No.  [id number]                               Onset Date:  10/13/24   Start of Care Date:        Type:     ___PT   _X_OT   ___SLP Medical Diagnosis:                           OT Diagnosis:  decreased ADL's due to dizziness   Visits from SOC:  1   _________________________________________________________________________________  Plan of Treatment/Functional Goals    Planned Interventions: ADL retraining, balance training, transfer training, bed mobility training, progressive activity/exercise   Goals: See Occupational Therapy Goals on Care Plan in [facility] electronic health record.    Therapy Frequency: 5 times/week  Predicted Duration of Therapy Intervention: 10/21/24  _________________________________________________________________________________    I CERTIFY THE NEED FOR THESE SERVICES FURNISHED UNDER        THIS PLAN OF TREATMENT AND WHILE UNDER MY CARE .             Physician Signature               Date    X_____________________________________________________                   ,      Referring Physician: Alexx Bridges MD            Initial Assessment        See Occupational Therapy evaluation dated   in Epic electronic health record.           10/14/24 0300   Appointment Info   Signing Clinician's Name / Credentials (OT) Anahi Potter OTR/L       Present yes   Language [place]   Living Environment   People in Home child(uma), adult   Current Living Arrangements house   Home Accessibility stairs to enter home   Number of Stairs, Main Entrance 3   Self-Care   Usual  Activity Tolerance moderate   Equipment Currently Used at Home walker, rolling   Fall history within last six months yes   Number of times patient has fallen within last six months 1   Activity/Exercise/Self-Care Comment Pt. receives assist with ADL's per pt.   Instrumental Activities of Daily Living (IADL)   Previous Responsibilities   (Pt. states she sometimes goes shopping with son with her walker, son assist with managing her medications)   General Information   Onset of Illness/Injury or Date of Surgery 10/13/24   Referring Physician Alexx Bridges MD   Additional Occupational Profile Info/Pertinent History of Current Problem Doug Davidson is a 64 year old female with a medical history of nicotine use, recent positive QuantiFERON test, remote history of Strongyloides infection, type II DM, depression, hypertension, intermittent vertigo/dizziness who is admitted due to ongoing weakness from possible acute hynatremia vd from another cause   Existing Precautions/Restrictions other (see comments)  (dizziness)   Cognitive Status Examination   Cognitive Status Comments Often states she does not remember when asking medical info.   Pain Assessment   Patient Currently in Pain No   Posture   Posture not impaired   Range of Motion Comprehensive   General Range of Motion no range of motion deficits identified   Bed Mobility   Bed Mobility supine-sit   Supine-Sit [place] (Bed Mobility) minimum assist (75% patient effort)   Transfers   Transfers sit-stand transfer   Sit-Stand Transfer   Sit-Stand [place] (Transfers) minimum assist (75% patient effort)   Assistive Device (Sit-Stand Transfers) other (see comments)  (HHA)   Balance   Balance Assessment standing static balance;sitting static balance   Sitting Balance: Static supervision   Position, Sitting Balance sitting edge of bed   Standing Balance: Static contact guard   Position/Device Used, Standing Balance other (see comments)  (HHA)   Activities of Daily  Living   BADL Assessment/Intervention toileting   Grooming Assessment/Training   [place] Level (Grooming) wash face, hands;oral care regimen;other (see comments)  (declined due to dizziness rated 10/10)   Toileting   [place] Level (Toileting) other (see comments)  (declined, but stated she felt like she was going to pass out when walking to bathroom earlier)   Clinical Impression   Criteria for Skilled Therapeutic Interventions Met (OT) Yes, treatment indicated   OT Diagnosis decreased ADL's due to dizziness   Influenced by the following impairments dizziness   OT Problem List-Impairments impacting ADL activity tolerance impaired;balance;mobility   Assessment of Occupational Performance 3-5 Performance Deficits   Identified Performance Deficits bed mobility, transfers, toileitng, G/H   Planned Therapy Interventions (OT) ADL retraining;balance training;transfer training;bed mobility training;progressive activity/exercise   Clinical Decision Making Complexity (OT) problem focused assessment/low complexity   Risk & Benefits of therapy have been explained evaluation/treatment results reviewed;patient;participants voiced agreement with care plan;son   OT Total Evaluation Time   OT Eval, Low Complexity Minutes (15383) 20   OT Goals   Therapy Frequency (OT) 5 times/week   OT Predicted Duration/Target Date for Goal Attainment 10/21/24   OT Goals Hygiene/Grooming;Toilet Transfer/Toileting   OT: Hygiene/Grooming supervision/stand-by assist;minimal assist   OT: Toilet Transfer/Toileting Minimal assist   Interventions   Interventions Quick Adds Therapeutic Activity   Therapeutic Activities   Therapeutic Activity Minutes (39225) 8   Symptoms noted during/after treatment dizziness   Treatment Detail/Skilled Intervention Pt. able to persist in ADL positions with encouragement after initial eval.-sitting at EOB for 3 minutes with increased dizziness, stood at EOB for 1 minute. BP lying 103/68-sitting 97/71- standing  97/60. Requested from  and explained vestibular order to pt. and family as source of dizziness still not determined.   OT Discharge Planning   OT Plan Continue to try to progress ADL's for return home , monitor dizziness   OT Discharge Recommendation (DC Rec) (S)  home with assist;other (see comments)  (Home PT/outpt. PT)   OT Rationale for DC Rec Family able to assist and pt. only requiring min assist with functional mobility   OT Brief overview of current status min dizziness to 10/10 with standing, not positive for orthostatic hypotention with mild drop in BP, min assist with sitting and standing at edge of bed-not wanting to continue activity due to dizziness   Total Session Time   Timed Code Treatment Minutes 8   Total Session Time (sum of timed and untimed services) 28

## 2024-10-16 ENCOUNTER — PATIENT OUTREACH (OUTPATIENT)
Dept: CARE COORDINATION | Facility: CLINIC | Age: 64
End: 2024-10-16
Payer: COMMERCIAL

## 2024-10-16 DIAGNOSIS — Z09 HOSPITAL DISCHARGE FOLLOW-UP: ICD-10-CM

## 2024-10-16 NOTE — TELEPHONE ENCOUNTER
Home Health Care orders have been placed per request. Approve the changes.    Suzanne Cummings, APRN, CNP

## 2024-10-16 NOTE — TELEPHONE ENCOUNTER
Call placed to [facility].  No answer, but has confidential line.  Relayed message from YARELIS Thayer from below.  Left call back number for questions or concerns.  Osorio Hackett RN

## 2024-10-16 NOTE — PROGRESS NOTES
Clinic Care Coordination Contact  Transitions of Care Outreach  Chief Complaint   Patient presents with    Clinic Care Coordination - Follow-up       Most Recent Admission Date: 10/13/2024   Most Recent Admission Diagnosis: Dizziness - R42  Hyponatremia - E87.1  Syncope, unspecified syncope type - R55     Most Recent Discharge Date: 10/15/2024   Most Recent Discharge Diagnosis: Hyponatremia - E87.1  Dizziness - R42  Syncope, unspecified syncope type - R55  Benign paroxysmal positional vertigo due to bilateral vestibular disorder - H81.13     Transitions of Care Assessment    Discharge Assessment  How are you doing now that you are home?: spoke with Doug and with verbal consent DIL via language line.  Took quite a few minutes to determine that patient indeed had meclizine in the home.  Gave full instructions for use.  Scheduled a RN appointment for 10/17 to complete a full med rec tomorrow.  Instructed several times to have all medication bottles present for the call.  How are your symptoms? (Red Flag symptoms escalate to triage hotline per guidelines): Improved  Do you know how to contact your clinic care team if you have future questions or changes to your health status? : Yes  Does the patient have their discharge instructions? : Yes  Does the patient have questions regarding their discharge instructions? : No  Were you started on any new medications or were there changes to any of your previous medications? : Yes  Does the patient have all of their medications?: Yes  Do you have questions regarding any of your medications? : No  Do you have all of your needed medical supplies or equipment (DME)?  (i.e. oxygen tank, CPAP, cane, etc.): Yes    Post-op (CHW CTA Only)  If the patient had a surgery or procedure, do they have any questions for a nurse?: No    Post-op (Clinicians Only)  Did the patient have surgery or a procedure: No  Eating & Drinking: eating and drinking without complaints/concerns  PO Intake: regular  diet  Bowel Function: normal  Urinary Status: voiding without complaint/concerns        Follow up Plan     Discharge Follow-Up  Discharge follow up appointment scheduled in alignment with recommended follow up timeframe or Transitions of Risk Category? (Low = within 30 days; Moderate= within 14 days; High= within 7 days): Yes  Discharge Follow Up Appointment Date: 10/24/24  Discharge Follow Up Appointment Scheduled with?: Primary Care Provider    Future Appointments   Date Time Provider Department Center   [facility]   10/24/2024 11:30 AM Suzanne Thayer APRN CNP [facility]   11/5/2024 10:00 AM Aicha Davis MD [facility]IN[facility]       Outpatient Plan as outlined on AVS reviewed with patient.    For any urgent concerns, please contact our 24 hour nurse triage line: [phone] ([phone])       Марина Short RN

## 2024-10-17 ENCOUNTER — TELEPHONE (OUTPATIENT)
Dept: PHARMACY | Facility: OTHER | Age: 64
End: 2024-10-17

## 2024-10-17 ENCOUNTER — PATIENT OUTREACH (OUTPATIENT)
Dept: NURSING | Facility: CLINIC | Age: 64
End: 2024-10-17
Payer: COMMERCIAL

## 2024-10-17 NOTE — PROGRESS NOTES
Clinic Care Coordination Contact  Follow Up Progress Note      Assessment: spoke with both patient and DIL for scheduled CCC RN follow up appointment.  50 minutes for full med rec via language line.  Confirmed patient is taking all medication's correctly except as noted below.  Patient remains to have hydrochlorothiazide and amlodipine on hold as directed.  Told them to discuss with PCP at 10/24 appointment.  DIL was only giving patient Sertraline 50 mg daily and unable to locate Sertraline 100 mg bottle. Additionally unable to locate Trazodone in the home.   Call to [place] [facility] in [place].  Was told that patient picked up Trazodone & Sertraline 100 mg at [facility] pharmacy in Sept and unable to  until Nov 30th.  After returning to the call, DIL stating that she had located the Sertraline 100 mg bottle.  Educated several times that patient was to take both the sertraline 100 mg and 50 mg to equal 150 mg.  Discussed importance of MTM appointment.  Described how MTM is different from CCC.  Patient and DIL agreeable.  Connected the call to scheduling and appointment with MTM made for 11/12 @ 2:00.  Care Gaps:    Health Maintenance Due   Topic Date Due    DEXA  Never done    MICROALBUMIN  Never done    DIABETIC FOOT EXAM  Never done    ADVANCE CARE PLANNING  Never done    DEPRESSION ACTION PLAN  Never done    EYE EXAM  Never done    PHQ-9  Never done    RSV VACCINE (1 - Risk 60-74 years 1-dose series) Never done    ZOSTER IMMUNIZATION (2 of 2) 01/16/2020    MEDICARE ANNUAL WELLNESS VISIT  06/01/2023       Did not discuss Care Gaps due to the nature of the call    Care Plans  Care Plan: Annual Wellness Visit       Problem: HP GENERAL PROBLEM       Goal: I will complete my annual wellness visit.       Note:     Barriers: language barrier  Strengths: family support    Patient expressed understanding of goal: yes  Action steps to achieve this goal:  1. I will schedule my annual wellness visit; [phone]  [phone]  2. I will attend my annual wellness visit.  3. I will contact my Care Management or clinic team if I have barriers to attending my annual wellness visit.                               Care Plan: Appointments/Referrals       Problem: HP GENERAL PROBLEM       Goal: I would like to attend the following appointments/referrals.       Start Date: 10/3/2024    Note:     Barriers: language barrier  Strengths: family support  Patient expressed understanding of goal: yes  Action steps to achieve this goal:  1. I will call to schedule both my Dexa Scan and my chest x-ray.  [phone]  2. I will call to schedule an eye appointment.  I am asking for my Community Healthcare Worker to mail resources of eye doctors covered by my plan.  3.  I would like to call and schedule a dental exam.  If I am unable to schedule this through the number provided on 10/16; my Community Healthcare Worker will provide additional resources.  4.  I will speak with the MTM 11/12 @ 1:00.                                Intervention/Education provided during outreach: yes, see above              Plan: CHW to outreach in 2 weeks as per previously scheduled.  Care Coordinator will follow up in 30 days per standard work.

## 2024-10-17 NOTE — Clinical Note
Boone Palacios!  Have talked to Doug & her DIL a couple of times last week and this week.  No trazodone in the home and they were unable to locate it.  Unable to refill until 11/30 per pharmacy.  She is supposed to continue to look for it.  She found the Sertraline 100 mg while we were on the phone and Doug is now taking both the 100 mg and the 50 mg.  I also got her scheduled with MT but not until 11/12.  Thanks!

## 2024-10-17 NOTE — TELEPHONE ENCOUNTER
MTM referral from: Transitions of Care (recent hospital discharge, TCU discharge, or ED visit)    MTM referral outreach attempt #1 on October 17, 2024 at 11:26 AM      Outcome: Spoke with patient daughter in [place], I was going to have a MTM call patient but then she told me an RN will be calling about medications today    Use chapis bowden MA,  jeanie needs  for the carrier/Plan on the flowsLondons Holiday Apartments Message Sent in case they can't to schedule following the RN medication conversation    Oriana Chaves [facility]  -[facility]  [phone]

## 2024-10-17 NOTE — Clinical Note
Boone Pelayo!  This patient isn't on your schedule until November 12th.  PCP follow up next week.  Missing Trazodone in the home and unable to be refilled until 11/30.  I will be on BREANA for the month of November but will have people covering my inbasket.  Thanks!

## 2024-10-22 ENCOUNTER — PATIENT OUTREACH (OUTPATIENT)
Dept: CARE COORDINATION | Facility: CLINIC | Age: 64
End: 2024-10-22
Payer: COMMERCIAL

## 2024-10-22 NOTE — PROGRESS NOTES
Attempted to reach patient and left VM. Current preventative visit is overdue. left scheduling number for patient to call. [phone]

## 2024-10-24 ENCOUNTER — OFFICE VISIT (OUTPATIENT)
Dept: FAMILY MEDICINE | Facility: CLINIC | Age: 64
End: 2024-10-24
Payer: COMMERCIAL

## 2024-10-24 VITALS
OXYGEN SATURATION: 98 % | SYSTOLIC BLOOD PRESSURE: 118 MMHG | TEMPERATURE: 98.3 F | DIASTOLIC BLOOD PRESSURE: 78 MMHG | HEART RATE: 66 BPM | BODY MASS INDEX: 23.4 KG/M2 | WEIGHT: 136.3 LBS

## 2024-10-24 DIAGNOSIS — F41.1 GAD (GENERALIZED ANXIETY DISORDER): ICD-10-CM

## 2024-10-24 DIAGNOSIS — I10 ESSENTIAL HYPERTENSION: ICD-10-CM

## 2024-10-24 DIAGNOSIS — E78.00 HIGH CHOLESTEROL: ICD-10-CM

## 2024-10-24 DIAGNOSIS — E11.9 TYPE 2 DIABETES MELLITUS WITHOUT COMPLICATION, WITHOUT LONG-TERM CURRENT USE OF INSULIN (H): ICD-10-CM

## 2024-10-24 DIAGNOSIS — Z09 HOSPITAL DISCHARGE FOLLOW-UP: Primary | ICD-10-CM

## 2024-10-24 DIAGNOSIS — B78.9 STRONGYLOIDES STERCORALIS INFECTION: ICD-10-CM

## 2024-10-24 DIAGNOSIS — Z22.7 LATENT TUBERCULOSIS: ICD-10-CM

## 2024-10-24 DIAGNOSIS — E78.5 HYPERLIPIDEMIA LDL GOAL <100: ICD-10-CM

## 2024-10-24 DIAGNOSIS — E87.1 HYPONATREMIA: ICD-10-CM

## 2024-10-24 DIAGNOSIS — D64.9 LOW HEMOGLOBIN: ICD-10-CM

## 2024-10-24 DIAGNOSIS — F33.1 MAJOR DEPRESSIVE DISORDER, RECURRENT, MODERATE (H): ICD-10-CM

## 2024-10-24 LAB
ANION GAP SERPL CALCULATED.3IONS-SCNC: 11 MMOL/L (ref 7–15)
BUN SERPL-MCNC: 6.2 MG/DL (ref 8–23)
CALCIUM SERPL-MCNC: 9 MG/DL (ref 8.8–10.4)
CHLORIDE SERPL-SCNC: 99 MMOL/L (ref 98–107)
CREAT SERPL-MCNC: 0.68 MG/DL (ref 0.51–0.95)
CREAT UR-MCNC: 92.3 MG/DL
EGFRCR SERPLBLD CKD-EPI 2021: >90 ML/MIN/1.73M2
ERYTHROCYTE [DISTWIDTH] IN BLOOD BY AUTOMATED COUNT: 12.7 % (ref 10–15)
FERRITIN SERPL-MCNC: 338 NG/ML (ref 11–328)
GLUCOSE SERPL-MCNC: 115 MG/DL (ref 70–99)
HCO3 SERPL-SCNC: 24 MMOL/L (ref 22–29)
HCT VFR BLD AUTO: 30.6 % (ref 35–47)
HGB BLD-MCNC: 10.2 G/DL (ref 11.7–15.7)
IRON BINDING CAPACITY (ROCHE): 227 UG/DL (ref 240–430)
IRON SATN MFR SERPL: 11 % (ref 15–46)
IRON SERPL-MCNC: 26 UG/DL (ref 37–145)
MCH RBC QN AUTO: 28.7 PG (ref 26.5–33)
MCHC RBC AUTO-ENTMCNC: 33.3 G/DL (ref 31.5–36.5)
MCV RBC AUTO: 86 FL (ref 78–100)
MICROALBUMIN UR-MCNC: 13.9 MG/L
MICROALBUMIN/CREAT UR: 15.06 MG/G CR (ref 0–25)
PLATELET # BLD AUTO: 376 10E3/UL (ref 150–450)
POTASSIUM SERPL-SCNC: 4 MMOL/L (ref 3.4–5.3)
RBC # BLD AUTO: 3.55 10E6/UL (ref 3.8–5.2)
SODIUM SERPL-SCNC: 134 MMOL/L (ref 135–145)
VIT B12 SERPL-MCNC: 315 PG/ML (ref 232–1245)
WBC # BLD AUTO: 7.6 10E3/UL (ref 4–11)

## 2024-10-24 PROCEDURE — 80048 BASIC METABOLIC PNL TOTAL CA: CPT | Performed by: NURSE PRACTITIONER

## 2024-10-24 PROCEDURE — 82607 VITAMIN B-12: CPT | Performed by: NURSE PRACTITIONER

## 2024-10-24 PROCEDURE — 82043 UR ALBUMIN QUANTITATIVE: CPT | Performed by: NURSE PRACTITIONER

## 2024-10-24 PROCEDURE — 83540 ASSAY OF IRON: CPT | Performed by: NURSE PRACTITIONER

## 2024-10-24 PROCEDURE — 99495 TRANSJ CARE MGMT MOD F2F 14D: CPT | Performed by: NURSE PRACTITIONER

## 2024-10-24 PROCEDURE — 36415 COLL VENOUS BLD VENIPUNCTURE: CPT | Performed by: NURSE PRACTITIONER

## 2024-10-24 PROCEDURE — 85027 COMPLETE CBC AUTOMATED: CPT | Performed by: NURSE PRACTITIONER

## 2024-10-24 PROCEDURE — 83550 IRON BINDING TEST: CPT | Performed by: NURSE PRACTITIONER

## 2024-10-24 PROCEDURE — 82570 ASSAY OF URINE CREATININE: CPT | Performed by: NURSE PRACTITIONER

## 2024-10-24 PROCEDURE — 82728 ASSAY OF FERRITIN: CPT | Performed by: NURSE PRACTITIONER

## 2024-10-24 PROCEDURE — 99207 PR FOOT EXAM NO CHARGE: CPT | Performed by: NURSE PRACTITIONER

## 2024-10-24 RX ORDER — QUETIAPINE FUMARATE 50 MG/1
50 TABLET, FILM COATED ORAL AT BEDTIME
Qty: 90 TABLET | Refills: 3 | Status: SHIPPED | OUTPATIENT
Start: 2024-10-24

## 2024-10-24 RX ORDER — SERTRALINE HYDROCHLORIDE 100 MG/1
100 TABLET, FILM COATED ORAL DAILY
Qty: 90 TABLET | Refills: 3 | Status: SHIPPED | OUTPATIENT
Start: 2024-10-24

## 2024-10-24 RX ORDER — METFORMIN HYDROCHLORIDE 500 MG/1
TABLET, EXTENDED RELEASE ORAL
Qty: 210 TABLET | Refills: 0 | Status: SHIPPED | OUTPATIENT
Start: 2024-10-24 | End: 2025-02-21

## 2024-10-24 RX ORDER — ROSUVASTATIN CALCIUM 10 MG/1
10 TABLET, COATED ORAL DAILY
Qty: 90 TABLET | Refills: 3 | Status: SHIPPED | OUTPATIENT
Start: 2024-10-24

## 2024-10-24 NOTE — ASSESSMENT & PLAN NOTE
She was seen in the hospital by an infectious disease specialist, latent TB was stated to not be treated.  She was to follow-up with them in 2-3 months.  She does have a follow-up appointment with them in November.

## 2024-10-24 NOTE — ASSESSMENT & PLAN NOTE
Recent Labs   Lab Test 10/03/24  1143 11/09/16  1015   CHOL 177 262*   HDL 34* 48*   LDL 98 176*   TRIG 227*  --      Non-HDL level 143  > 130 is considered high risk, proxy for LDL-P    Lipoprotein (a): 9  Lipoprotein (a) (LP(a)): is a test that evaluates for genetic predisposition to making atherogenic lipid particles.   Reference ranges:  <30 is low risk  >30 is high risk    Apo B: 105  Apolipoprotein B (Apo B) is a test to evaluate for heart disease causing lipid particles   [facility] Lab reference range for Apo B  Here are the reference ranges:  Males/Females > 18 years of age  Desirable <90  Above desirable: 90-99  Borderline High 100-119  High: 120-130  Very high > or = 140    The 10-year ASCVD risk score (Darryl AGUILAR, et al., 2019) is: 19.6%    Values used to calculate the score:      Age: 64 years      Sex: Female      Is Non- : No      Diabetic: Yes      Tobacco smoker: Yes      Systolic Blood Pressure: 118 mmHg      Is BP treated: No      HDL Cholesterol: 34 mg/dL      Total Cholesterol: 177 mg/dL    Family History:  unknown    Risks: DMT2  Positives: normotensive, normoweight  Plan: Rosuvastatin 10 mg

## 2024-10-24 NOTE — PROGRESS NOTES
Assessment & Plan   Problem List Items Addressed This Visit       Essential hypertension     BP Readings from Last 3 Encounters:   10/24/24 118/78   10/15/24 94/57   09/26/24 122/64     Discontinued hydrochlorothiazide and amlodipine.  No further medications for now.  Will continue to monitor her blood pressure consider ACE/ARB if blood pressure rises.         CRISTINO (generalized anxiety disorder)     Sertraline 150 mg once daily, refill has been sent to the pharmacy.         Relevant Medications    sertraline (ZOLOFT) 100 MG tablet    sertraline (ZOLOFT) 50 MG tablet    Hyperlipidemia LDL goal <100     Recent Labs   Lab Test 10/03/24  1143 11/09/16  1015   CHOL 177 262*   HDL 34* 48*   LDL 98 176*   TRIG 227*  --      Non-HDL level 143  > 130 is considered high risk, proxy for LDL-P    Lipoprotein (a): 9  Lipoprotein (a) (LP(a)): is a test that evaluates for genetic predisposition to making atherogenic lipid particles.   Reference ranges:  <30 is low risk  >30 is high risk    Apo B: 105  Apolipoprotein B (Apo B) is a test to evaluate for heart disease causing lipid particles   [facility] reference range for Apo B  Here are the reference ranges:  Males/Females > 18 years of age  Desirable <90  Above desirable: 90-99  Borderline High 100-119  High: 120-130  Very high > or = 140    The 10-year ASCVD risk score (Darryl AGUILAR, et al., 2019) is: 19.6%    Values used to calculate the score:      Age: 64 years      Sex: Female      Is Non- : No      Diabetic: Yes      Tobacco smoker: Yes      Systolic Blood Pressure: 118 mmHg      Is BP treated: No      HDL Cholesterol: 34 mg/dL      Total Cholesterol: 177 mg/dL    Family History:  unknown    Risks: DMT2  Positives: normotensive, normoweight  Plan: Rosuvastatin 10 mg             Relevant Medications    rosuvastatin (CRESTOR) 10 MG tablet    Latent tuberculosis     She was seen in the hospital by an infectious disease specialist, latent TB was  stated to not be treated.  She was to follow-up with them in 2-3 months.  She does have a follow-up appointment with them in November.         Strongyloides stercoralis infection     Following with infectious disease in November.         Major depressive disorder, recurrent, moderate (H)     Seroquel 50 mg daily at bedtime.  Refill given  Sertraline 150 mg daily.         Relevant Medications    sertraline (ZOLOFT) 100 MG tablet    sertraline (ZOLOFT) 50 MG tablet    QUEtiapine (SEROQUEL) 50 MG tablet    Type 2 diabetes mellitus without complication, without long-term current use of insulin (H)     Lab Results   Component Value Date    A1C 6.5 10/03/2024       Creatinine: 0.84 GFR 76  Recent Labs   Lab Test 10/03/24  1143 11/09/16  1015   CHOL 177 262*   HDL 34* 48*   LDL 98 176*   TRIG 227*  --        MDM: Today we discussed what diabetes is and started metformin.  We did discuss trying to avoid refined carbohydrates and increase her activity levels.  Medications: Metformin 500 mg once daily for 30 days then increase the dose to 1000 mg once daily at dinnertime  Nutrition: recommend a low carbohydrate whole foods high-protein diet  Exercise: Recommend some cardiorespiratory fitness activity and resistance training  Statin: Simvastatin 10 mg  Aspirin: Will discuss next visit  ACE/ARB: She was recently taken off her blood pressure medications because her blood pressure was going too low and she was dizzy.  For now we will hold continue to monitor blood pressure  Eye Exam: She has a follow-up eye diabetic eye exam in the near future  Foot Exam: Done today no neuropathy           Relevant Medications    metFORMIN (GLUCOPHAGE XR) 500 MG 24 hr tablet    Other Relevant Orders    FOOT EXAM (Completed)    Hyponatremia     Rechecking BMP today.  She is not showing symptoms.          Other Visit Diagnoses       Hospital discharge follow-up    -  Primary    Relevant Orders    CBC with platelets (Completed)    Basic metabolic  panel  (Ca, Cl, CO2, Creat, Gluc, K, Na, BUN) (Completed)    Low hemoglobin        Relevant Orders    Iron and iron binding capacity    Ferritin    Vitamin B12           Hemoglobin was low today, we will check for iron deficiency as well.     MED REC REQUIRED  Post Medication Reconciliation Status:  Discharge medications reconciled and changed, see notes/orders  FUTURE APPOINTMENTS:       - Follow-up visit in 3 months or sooner for any problems.       Namrata Camacho is a 64 year old, presenting for the following health issues:  Recheck Medication      10/24/2024    11:19 AM   Additional Questions   Roomed by KIKE Oneill   Accompanied by Daughter and interp. [id number] [facility] Follow-up Visit:    Hospital/Nursing Home/ Rehab Facility: [facility]  Date of Admission: 10/13/24  Date of Discharge: 10/15/24  Reason(s) for Admission: Dizziness   Hyponatremia  Syncope, unspecified syncope type  Was the patient in the ICU or did the patient experience delirium during hospitalization?  No  Do you have any other stressors you would like to discuss with your provider? No    Problems taking medications regularly:  None  Medication changes since discharge: unsure   Problems adhering to non-medication therapy:  None    Summary of hospitalization:  [facility] discharge summary reviewed  Diagnostic Tests/Treatments reviewed.  Follow up needed: BMP and CBC  Other Healthcare Providers Involved in Patient s Care:          Infectious disease appointment with provider on November  Update since discharge: improved.       DMT2 discussed what diabetes is, start Metformin, has been on rosuvastatin has not having any side effects.    Latent TB is following up with ID specialist in November.  She saw ID in the hospital and they determined that she did not have to have any treatment from her latent TB but to refollow-up in 2-3 months with them for recheck of the QuantiFERON  gold.  Hyperlipidemia rosuvastatin 10 mg will continue to have her take it.  Dizziness improved is not having any additional dizziness right now.  Hypertension Blood pressure medications hydrochlorothiazide and amlodipine have been continued by the hospital.  She has not really had any dizziness since they have been discontinued.  Her blood pressure is 118/78 today continue the discontinuation.  Anxiety/depression sertraline 150 mg prescription given for the year, she is on Seroquel as well these have both been started by a psychiatrist.  Plan of care communicated with patient and family through a [facility] .           Review of Systems  Constitutional, HEENT, cardiovascular, pulmonary, gi and gu systems are negative, except as otherwise noted.      Objective    /78   Pulse 66   Temp 98.3  F (36.8  C) (Tympanic)   Wt 61.8 kg (136 lb 4.8 oz)   SpO2 98%   BMI 23.40 kg/m    Body mass index is 23.4 kg/m .  Physical Exam  Constitutional:       Appearance: Normal appearance.   HENT:      Head: Normocephalic.      Right Ear: Tympanic membrane, ear canal and external ear normal.      Left Ear: Tympanic membrane, ear canal and external ear normal.      Nose: Nose normal.      Mouth/Throat:      Mouth: Mucous membranes are moist.      Pharynx: Oropharynx is clear.   Cardiovascular:      Rate and Rhythm: Normal rate and regular rhythm.      Pulses:           Dorsalis pedis pulses are 2+ on the right side and 2+ on the left side.        Posterior tibial pulses are 2+ on the right side and 2+ on the left side.      Heart sounds: Normal heart sounds.   Pulmonary:      Effort: Pulmonary effort is normal.      Breath sounds: Normal breath sounds.   Abdominal:      General: Bowel sounds are normal.      Palpations: Abdomen is soft.   Musculoskeletal:      Cervical back: Normal range of motion.      Right foot: Normal range of motion. No deformity or bunion.      Left foot: Normal range of motion. No deformity or  jose.   Feet:      Right foot:      Protective Sensation: 10 sites tested.  10 sites sensed.      Skin integrity: Skin integrity normal.      Toenail Condition: Right toenails are normal.      Left foot:      Protective Sensation: 10 sites tested.  10 sites sensed.      Skin integrity: Skin integrity normal.      Toenail Condition: Left toenails are normal.      Comments: Neuropathy: none  Lymphadenopathy:      Cervical: No cervical adenopathy.   Skin:     General: Skin is warm and dry.   Neurological:      Mental Status: She is alert and oriented to person, place, and time.   Psychiatric:         Mood and Affect: Mood normal.         Behavior: Behavior normal.         Thought Content: Thought content normal.         Judgment: Judgment normal.              Signed Electronically by: NEIDA Prince CNP

## 2024-10-24 NOTE — ASSESSMENT & PLAN NOTE
BP Readings from Last 3 Encounters:   10/24/24 118/78   10/15/24 94/57   09/26/24 122/64     Discontinued hydrochlorothiazide and amlodipine.  No further medications for now.  Will continue to monitor her blood pressure consider ACE/ARB if blood pressure rises.

## 2024-10-24 NOTE — ASSESSMENT & PLAN NOTE
Lab Results   Component Value Date    A1C 6.5 10/03/2024       Creatinine: 0.84 GFR 76  Recent Labs   Lab Test 10/03/24  1143 11/09/16  1015   CHOL 177 262*   HDL 34* 48*   LDL 98 176*   TRIG 227*  --        MDM: Today we discussed what diabetes is and started metformin.  We did discuss trying to avoid refined carbohydrates and increase her activity levels.  Medications: Metformin 500 mg once daily for 30 days then increase the dose to 1000 mg once daily at dinnertime  Nutrition: recommend a low carbohydrate whole foods high-protein diet  Exercise: Recommend some cardiorespiratory fitness activity and resistance training  Statin: Simvastatin 10 mg  Aspirin: Will discuss next visit  ACE/ARB: She was recently taken off her blood pressure medications because her blood pressure was going too low and she was dizzy.  For now we will hold continue to monitor blood pressure  Eye Exam: She has a follow-up eye diabetic eye exam in the near future  Foot Exam: Done today no neuropathy

## 2024-10-25 NOTE — PROGRESS NOTES
Clinic Care Coordination Contact  Community Health Worker Follow Up    Care Gaps:     Health Maintenance Due   Topic Date Due    DEXA  Never done    ADVANCE CARE PLANNING  Never done    DEPRESSION ACTION PLAN  Never done    EYE EXAM  Never done    PHQ-9  Never done    RSV VACCINE (1 - Risk 60-74 years 1-dose series) Never done    ZOSTER IMMUNIZATION (2 of 2) 01/16/2020    MEDICARE ANNUAL WELLNESS VISIT  06/01/2023       Postponed to next CHW follow up     Care Plan: CHW mailing patient a list of Dental providers that accept [facility] Medical Assistance.    Intervention and Education during outreach: N/A    CHW Plan: CHW will follow up with patient around 10/31/24.    Kirstin Rodriguez  Community Health Worker  [facility] Care Coordination   [place], [facility], [place], [place], [place] and [facility]  Office: [phone]

## 2024-10-27 DIAGNOSIS — D50.9 IRON DEFICIENCY ANEMIA, UNSPECIFIED IRON DEFICIENCY ANEMIA TYPE: Primary | ICD-10-CM

## 2024-10-27 RX ORDER — FERROUS SULFATE 325(65) MG
325 TABLET ORAL
Qty: 90 TABLET | Refills: 3 | Status: SHIPPED | OUTPATIENT
Start: 2024-10-27

## 2024-10-27 NOTE — PROGRESS NOTES
Low Hgb, recheck in 2 weeks as well as the Retic count. Colonoscopy has been ordered to check for blood loss through the colon. Start on Ferrous sulfate 325 mg with food.     Suzanne Cummings, NEIDA, CNP

## 2024-10-31 ENCOUNTER — PATIENT OUTREACH (OUTPATIENT)
Dept: CARE COORDINATION | Facility: CLINIC | Age: 64
End: 2024-10-31
Payer: COMMERCIAL

## 2024-10-31 NOTE — PROGRESS NOTES
Clinic Care Coordination Contact  Care Coordination Clinician Chart Review    Situation: Patient chart reviewed by Care Coordinator.       Background: Care Coordination Program started: 9/26/2024. Initial assessment completed and patient-centered care plan(s) were developed with participation from patient. Lead CC handed patient off to CHW for continued outreaches.       Assessment: Per chart review, patient outreach completed by CC CHW on 10/25/24.  Patient is actively working to accomplish goal(s). Patient's goal(s) appropriate and relevant at this time. Patient is not due for updated Plan of Care.  Assessments will be completed annually or as needed/with change of patient status.      Care Plan: Annual Wellness Visit       Problem: HP GENERAL PROBLEM       Goal: I will complete my annual wellness visit.       Note:     Barriers: language barrier  Strengths: family support    Patient expressed understanding of goal: yes  Action steps to achieve this goal:  1. I will schedule my annual wellness visit; [phone] ([phone])  2. I will attend my annual wellness visit.  3. I will contact my Care Management or clinic team if I have barriers to attending my annual wellness visit.                               Care Plan: Appointments/Referrals       Problem: HP GENERAL PROBLEM       Goal: I would like to attend the following appointments/referrals.       Start Date: 10/3/2024    Note:     Barriers: language barrier  Strengths: family support  Patient expressed understanding of goal: yes  Action steps to achieve this goal:  1. I will call to schedule both my Dexa Scan and my chest x-ray.  [phone]  2. I will call to schedule an eye appointment.  I am asking for my Community Healthcare Worker to mail resources of eye doctors covered by my plan.  3.  I would like to call and schedule a dental exam.  If I am unable to schedule this through the number provided on 10/16; my Community Healthcare Worker will provide  additional resources.  4.  I will speak with the MTM 11/12 @ 1:00.                                   Plan/Recommendations: The patient will continue working with Care Coordination to achieve goal(s) as above. CHW will continue outreaches at minimum every 30 days and will involve Lead CC as needed or if patient is ready to move to Maintenance. Lead CC will continue to monitor CHW outreaches and patient's progress to goal(s) every 6 weeks.     Plan of Care updated and sent to patient: No

## 2024-11-01 ENCOUNTER — PATIENT OUTREACH (OUTPATIENT)
Dept: CARE COORDINATION | Facility: CLINIC | Age: 64
End: 2024-11-01
Payer: COMMERCIAL

## 2024-11-01 NOTE — PROGRESS NOTES
Clinic Care Coordination Contact  Community Health Worker Follow Up    Care Gaps:     Health Maintenance Due   Topic Date Due    DEXA  Never done    ADVANCE CARE PLANNING  Never done    DEPRESSION ACTION PLAN  Never done    EYE EXAM  Never done    PHQ-9  Never done    RSV VACCINE (1 - Risk 60-74 years 1-dose series) Never done    ZOSTER IMMUNIZATION (2 of 2) 01/16/2020    MEDICARE ANNUAL WELLNESS VISIT  06/01/2023       Scheduled for AWV on 1/2/25 @ 8:40      Care Plan:   Care Plan: Annual Wellness Visit       Problem: HP GENERAL PROBLEM       Goal: I will complete my annual wellness visit.       Note:     Barriers: language barrier  Strengths: family support    Patient expressed understanding of goal: yes  Action steps to achieve this goal:  1. I will schedule my annual wellness visit; [phone] ([phone]). My CHW assisted me in scheduling my AWV for 1/2/25 @ 8:40. Completed  2. I will attend my annual wellness visit.  3. I will contact my Care Management or clinic team if I have barriers to attending my annual wellness visit.                               Care Plan: Appointments/Referrals       Problem: HP GENERAL PROBLEM       Goal: I would like to attend the following appointments/referrals.       Start Date: 10/3/2024    This Visit's Progress: 20%    Note:     Barriers: language barrier  Strengths: family support  Patient expressed understanding of goal: yes  Action steps to achieve this goal:  1. I will call to schedule both my Dexa Scan and my chest x-ray; [phone]. My CHW assisted me in scheduling both appointments scheduled with [facility]: Chest X ray: 11/15/24 @ 8:05 and DEXA Scan: 11/15/24 @ 8:45.  2. I will call to schedule an eye appointment. Scheduled 11/12/24 @ 10:55.  3. I would like to call and schedule a dental exam.  If I am unable to schedule this through the number provided on 10/16; my Community Healthcare Worker will provide additional resources. CHW sent locations by  mail and I will check with my  to see if we received that letter yet.  4.  I will speak with the MTM 11/12 @ 1:00. Continuous (MB)                                Intervention and Education during outreach: I assisted patient in scheduling the following appointments:  AWV scheduled 1/2/25 @ 8:40  Chest X ray: 11/15/24 @ 8:05 and DEXA Scan: 11/15/24 @ 8:45.    Patient is not sure if she received my letter in the mail with Dental locations in her area. Patient will check with her  to see if she has received my letter.     No other needs at this time.     CHW Plan: CHW will follow up with patient in about 1 month.     Kirstin Rodriguez  Community Health Worker  [facility] Care Coordination   [place], [place], [place], [place] and [facility]  Office: [phone]

## 2024-11-05 ENCOUNTER — LAB (OUTPATIENT)
Dept: LAB | Facility: CLINIC | Age: 64
End: 2024-11-05
Payer: COMMERCIAL

## 2024-11-05 ENCOUNTER — OFFICE VISIT (OUTPATIENT)
Dept: INFECTIOUS DISEASES | Facility: CLINIC | Age: 64
End: 2024-11-05
Attending: NURSE PRACTITIONER
Payer: COMMERCIAL

## 2024-11-05 VITALS
BODY MASS INDEX: 23.36 KG/M2 | OXYGEN SATURATION: 96 % | WEIGHT: 136.1 LBS | SYSTOLIC BLOOD PRESSURE: 132 MMHG | TEMPERATURE: 98.2 F | DIASTOLIC BLOOD PRESSURE: 84 MMHG | HEART RATE: 77 BPM

## 2024-11-05 DIAGNOSIS — Z22.7 LATENT TUBERCULOSIS BY BLOOD TEST: ICD-10-CM

## 2024-11-05 DIAGNOSIS — D50.9 IRON DEFICIENCY ANEMIA, UNSPECIFIED IRON DEFICIENCY ANEMIA TYPE: ICD-10-CM

## 2024-11-05 DIAGNOSIS — Z22.7 LATENT TUBERCULOSIS BY BLOOD TEST: Primary | ICD-10-CM

## 2024-11-05 LAB
ALBUMIN SERPL BCG-MCNC: 3.8 G/DL (ref 3.5–5.2)
ALP SERPL-CCNC: 95 U/L (ref 40–150)
ALT SERPL W P-5'-P-CCNC: 8 U/L (ref 0–50)
ANION GAP SERPL CALCULATED.3IONS-SCNC: 10 MMOL/L (ref 7–15)
AST SERPL W P-5'-P-CCNC: 23 U/L (ref 0–45)
BILIRUB SERPL-MCNC: 0.2 MG/DL
BUN SERPL-MCNC: 12.8 MG/DL (ref 8–23)
CALCIUM SERPL-MCNC: 9.6 MG/DL (ref 8.8–10.4)
CHLORIDE SERPL-SCNC: 102 MMOL/L (ref 98–107)
CREAT SERPL-MCNC: 0.75 MG/DL (ref 0.51–0.95)
EGFRCR SERPLBLD CKD-EPI 2021: 87 ML/MIN/1.73M2
ERYTHROCYTE [DISTWIDTH] IN BLOOD BY AUTOMATED COUNT: 12.6 % (ref 10–15)
FERRITIN SERPL-MCNC: 273 NG/ML (ref 11–328)
GLUCOSE SERPL-MCNC: 108 MG/DL (ref 70–99)
HCO3 SERPL-SCNC: 23 MMOL/L (ref 22–29)
HCT VFR BLD AUTO: 31.4 % (ref 35–47)
HGB BLD-MCNC: 10.3 G/DL (ref 11.7–15.7)
IRON BINDING CAPACITY (ROCHE): 211 UG/DL (ref 240–430)
IRON SATN MFR SERPL: 20 % (ref 15–46)
IRON SERPL-MCNC: 42 UG/DL (ref 37–145)
MCH RBC QN AUTO: 28.8 PG (ref 26.5–33)
MCHC RBC AUTO-ENTMCNC: 32.8 G/DL (ref 31.5–36.5)
MCV RBC AUTO: 88 FL (ref 78–100)
PLATELET # BLD AUTO: 566 10E3/UL (ref 150–450)
POTASSIUM SERPL-SCNC: 4.4 MMOL/L (ref 3.4–5.3)
PROT SERPL-MCNC: 7.5 G/DL (ref 6.4–8.3)
RBC # BLD AUTO: 3.58 10E6/UL (ref 3.8–5.2)
RETICS # AUTO: 0.05 10E6/UL (ref 0.03–0.1)
RETICS/RBC NFR AUTO: 1.4 % (ref 0.5–2)
SODIUM SERPL-SCNC: 135 MMOL/L (ref 135–145)
WBC # BLD AUTO: 5.2 10E3/UL (ref 4–11)

## 2024-11-05 PROCEDURE — 80053 COMPREHEN METABOLIC PANEL: CPT

## 2024-11-05 PROCEDURE — 36415 COLL VENOUS BLD VENIPUNCTURE: CPT

## 2024-11-05 PROCEDURE — 99213 OFFICE O/P EST LOW 20 MIN: CPT | Performed by: STUDENT IN AN ORGANIZED HEALTH CARE EDUCATION/TRAINING PROGRAM

## 2024-11-05 PROCEDURE — 82728 ASSAY OF FERRITIN: CPT

## 2024-11-05 PROCEDURE — 83550 IRON BINDING TEST: CPT

## 2024-11-05 PROCEDURE — 85027 COMPLETE CBC AUTOMATED: CPT

## 2024-11-05 PROCEDURE — 83540 ASSAY OF IRON: CPT

## 2024-11-05 PROCEDURE — 86481 TB AG RESPONSE T-CELL SUSP: CPT

## 2024-11-05 PROCEDURE — 85045 AUTOMATED RETICULOCYTE COUNT: CPT

## 2024-11-05 NOTE — PROGRESS NOTES
[facility] post-hospital stay follow up.    Name: Doug Davidson  :   1960  MRN:   [id number]  PCP:    Suzanne Thayer  DOS:    24      CC: Post Hospital Stay follow up for positive QuantiFERON gold.    HPI/Interval History:  Doug Davidson is a 64 year old old female with the history of smoking, diabetes mellitus who was recently admitted to the hospital with hyponatremia.  ID was consulted on a  for positive QuantiFERON gold.  No  available.  Patient is now in clinic for positive QuantiFERON gold.  She denies any fever, chills, night sweats.  No cough, chest pain, shortness of breath.  No weight loss.  She denies any prior diagnosis of prior treatment for tuberculosis.  No known exposure to tuberculosis.    ===========================  Past Medical History:  Past Medical History:   Diagnosis Date    Tobacco use disorder 2023    Formatting of this note might be different from the original.   2 cig per day         Past Surgical History:  No prior surgery.    Social History:  Social History     Socioeconomic History    Marital status:      Spouse name: Not on file    Number of children: Not on file    Years of education: Not on file    Highest education level: Not on file   Occupational History    Not on file   Tobacco Use    Smoking status: Some Days    Smokeless tobacco: Not on file   Vaping Use    Vaping status: Former   Substance and Sexual Activity    Alcohol use: Not on file    Drug use: Not on file    Sexual activity: Not on file   Other Topics Concern    Not on file   Social History Narrative    Not on file     Social Drivers of Health     Financial Resource Strain: Low Risk  (10/13/2024)    Financial Resource Strain     Within the past 12 months, have you or your family members you live with been unable to get utilities (heat, electricity) when it was really needed?: No   Food Insecurity: Low Risk  (10/13/2024)    Food Insecurity     Within the past 12  months, did you worry that your food would run out before you got money to buy more?: No     Within the past 12 months, did the food you bought just not last and you didn t have money to get more?: No   Transportation Needs: Low Risk  (10/13/2024)    Transportation Needs     Within the past 12 months, has lack of transportation kept you from medical appointments, getting your medicines, non-medical meetings or appointments, work, or from getting things that you need?: No   Physical Activity: Not on file   Stress: Not on file   Social Connections: Not on file   Interpersonal Safety: Low Risk  (10/13/2024)    Interpersonal Safety     Do you feel physically and emotionally safe where you currently live?: Yes     Within the past 12 months, have you been hit, slapped, kicked or otherwise physically hurt by someone?: No     Within the past 12 months, have you been humiliated or emotionally abused in other ways by your partner or ex-partner?: No   Housing Stability: High Risk (10/13/2024)    Housing Stability     Do you have housing? : No     Are you worried about losing your housing?: No       Family Medical History:  Family History   Problem Relation Age of Onset    Colon Cancer No family hx of     Breast Cancer No family hx of        Allergies:   No Known Allergies    Medications:  Current Outpatient Medications   Medication Sig Dispense Refill    ammonium lactate (AMLACTIN) 12 % external cream Apply liberally to dry skin daily.      Cholecalciferol (VITAMIN D) 50 MCG (2000 UT) CAPS       ferrous sulfate (FEROSUL) 325 (65 Fe) MG tablet Take 1 tablet (325 mg) by mouth daily (with breakfast). 90 tablet 3    fluticasone (FLONASE) 50 MCG/ACT nasal spray 2 sprays.      gabapentin (NEURONTIN) 100 MG capsule Take 100 mg by mouth.      hydrocortisone (CORTAID) 1 % external cream Apply topically 2 times daily as needed for rash or itching.      meclizine (ANTIVERT) 25 MG tablet Take 1 tablet (25 mg) by mouth 3 times daily as  needed for dizziness. 30 tablet 0    metFORMIN (GLUCOPHAGE XR) 500 MG 24 hr tablet Take 1 tablet (500 mg) by mouth daily (with dinner) for 30 days, THEN 2 tablets (1,000 mg) daily (with dinner). 210 tablet 0    omeprazole (PRILOSEC) 20 MG DR capsule Take 20 mg by mouth.      QUEtiapine (SEROQUEL) 50 MG tablet Take 1 tablet (50 mg) by mouth at bedtime. 90 tablet 3    rosuvastatin (CRESTOR) 10 MG tablet Take 1 tablet (10 mg) by mouth daily. 90 tablet 3    SENEXON-S 8.6-50 MG tablet Take 1 tablet by mouth 2 times daily      sertraline (ZOLOFT) 100 MG tablet Take 1 tablet (100 mg) by mouth daily. 90 tablet 3    sertraline (ZOLOFT) 50 MG tablet Take 1 tablet (50 mg) by mouth daily. 90 tablet 3    traZODone (DESYREL) 50 MG tablet Take 1 tablet (50 mg) by mouth at bedtime. 30 tablet 0       Immunizations:  Immunization History   Administered Date(s) Administered    COVID-19 12+ (Pfizer) 09/26/2024    COVID-19 Bivalent 18+ (Moderna) 11/17/2022    COVID-19 Monovalent 18+ (Moderna) 05/24/2021, 06/21/2021, 05/18/2022    HEPA 11/09/2016    HepB 02/19/2016, 03/18/2016, 08/23/2016    HepB, Unspecified 02/19/2016, 03/18/2016, 08/23/2016    Hepatitis A (ADULT 19+) 11/09/2016    Hepatitis A Immunity: Titer/MD Dx 11/09/2016    Hepatitis B Immunity: Titer 11/09/2016    Hepatitis B, Adult 02/19/2016, 03/18/2016, 08/23/2016, 11/09/2016    Hepatitis B, Peds 11/09/2016    Influenza (High Dose) Trivalent,PF (Fluzone) 09/26/2024    Influenza Vaccine >6 months,quad, PF 11/22/2016, 09/26/2017, 10/30/2018, 09/09/2019, 02/01/2023    Influenza Vaccine, 6+MO IM (QUADRIVALENT W/PRESERVATIVES) 11/22/2016    MMR 02/19/2016, 03/18/2016    Pneumococcal 20 valent Conjugate (Prevnar 20) 02/01/2023    Pneumococcal 23 valent 03/20/2018    TD,PF 7+ (Tenivac) 02/19/2016, 03/18/2016    TDAP Vaccine (Boostrix) 11/22/2016    Td (Adult), Adsorbed 02/19/2016, 03/18/2016    Varicella 11/09/2016    Varicella Immunity: Titer/MD Dx 11/09/2016    Zoster recombinant  adjuvanted (SHINGRIX) 11/21/2019       ==================    Review of System:  12 points review of system is negative except for findings in the HPI.    Exam  VS: /84 (BP Location: Right arm, Patient Position: Sitting, Cuff Size: Adult Regular)   Pulse 77   Temp 98.2  F (36.8  C) (Oral)   Wt 61.7 kg (136 lb 1.6 oz)   SpO2 96%   BMI 23.36 kg/m      Gen: Pleasant in no acute distress.  HEENT: NCAT. EOMI.  Neck: No LAD.  Lungs: Clear to auscultation bilaterally with no crackles or wheezes.   Card: RRR. No RMG. Peripheral pulses present and symmetrical. No edema.   Abd: Soft NT ND. No hepatomegaly or splenomegaly.  Ext: No edema  Lymph: No cervical or supraclavicular adenopathy.  Skin: No rash  Neuro: Alert and oriented to place time and person. Cranial nerves grossly intact.     Labs:  Positive QuantiFERON gold.    Imaging:  T scan of the chest reviewed.    Assessment:  Doug Davidson is a 64 year old old female with positive QuantiFERON gold in a patient from high endemic region.  No symptoms suggestive of active tuberculosis.  Previous QuantiFERON gold was weakly positive.      Recommendations:  Repeat QuantiFERON gold today.  If positive, will treat the patient as LTBI with rifampin plus INH for 3 months.      Aicha Davis MD  [facility]  [facility]  Office Telephone [phone].  Fax [phone]  [facility] paging

## 2024-11-06 ENCOUNTER — PATIENT OUTREACH (OUTPATIENT)
Dept: ONCOLOGY | Facility: CLINIC | Age: 64
End: 2024-11-06
Payer: COMMERCIAL

## 2024-11-06 DIAGNOSIS — D69.6 THROMBOCYTOPENIA (H): ICD-10-CM

## 2024-11-06 DIAGNOSIS — D50.9 IRON DEFICIENCY ANEMIA, UNSPECIFIED IRON DEFICIENCY ANEMIA TYPE: Primary | ICD-10-CM

## 2024-11-06 LAB
GAMMA INTERFERON BACKGROUND BLD IA-ACNC: 0.02 IU/ML
M TB IFN-G BLD-IMP: NEGATIVE
M TB IFN-G CD4+ BCKGRND COR BLD-ACNC: 8.02 IU/ML
MITOGEN IGNF BCKGRD COR BLD-ACNC: 0.14 IU/ML
MITOGEN IGNF BCKGRD COR BLD-ACNC: 0.15 IU/ML
QUANTIFERON MITOGEN: 8.04 IU/ML
QUANTIFERON NIL TUBE: 0.02 IU/ML
QUANTIFERON TB1 TUBE: 0.16 IU/ML
QUANTIFERON TB2 TUBE: 0.17

## 2024-11-06 NOTE — PROGRESS NOTES
New Patient Oncology Nurse Navigator Note     Referral Received: 11/06/24      Referring provider: Suzanne Thayer APRN CNP     Referring Clinic/Organization: [facility]     Referred to: Benign Hematology    Requested provider (if applicable): First available - did not specify      Evaluation for :   Diagnosis   D50.9 (ICD-10-CM) - Iron deficiency anemia, unspecified iron deficiency anemia type   D69.6 (ICD-10-CM) - Thrombocytopenia (H)      Clinical History (per Nurse review of records provided):       Latest Reference Range & Units 10/03/24 11:43 10/13/24 02:52 10/13/24 06:30 10/24/24 12:34 11/05/24 11:11   Ferritin 11 - 328 ng/mL    338 (H) 273   Iron 37 - 145 ug/dL    26 (L) 42   Iron Binding Capacity 240 - 430 ug/dL    227 (L) 211 (L)   Iron Sat Index 15 - 46 %    11 (L) 20   WBC 4.0 - 11.0 10e3/uL 8.3 6.3 7.1 7.6 5.2   Hemoglobin 11.7 - 15.7 g/dL 11.0 (L) 11.6 (L) 12.0 10.2 (L) 10.3 (L)   Hematocrit 35.0 - 47.0 % 33.3 (L) 33.7 (L) 34.6 (L) 30.6 (L) 31.4 (L)   Platelet Count 150 - 450 10e3/uL 495 (H) 483 (H) 547 (H) 376 566 (H)   RBC Count 3.80 - 5.20 10e6/uL 3.81 4.02 4.13 3.55 (L) 3.58 (L)   MCV 78 - 100 fL 87 84 84 86 88   MCH 26.5 - 33.0 pg 28.9 28.9 29.1 28.7 28.8   MCHC 31.5 - 36.5 g/dL 33.0 34.4 34.7 33.3 32.8   RDW 10.0 - 15.0 % 12.4 12.1 12.2 12.7 12.6   (H): Data is abnormally high  (L): Data is abnormally low    Records Location: [facility]     Additional testing needed prior to consult:     ?    Referral updates and Plan:     11/06/2024 3:06 PM -  Referral received and reviewed. Sent to NPS to schedule first available.    Mai Perez, KYLEN, RN  Hematology/Oncology Nurse Navigator  [facility]  [phone] / [phone]

## 2024-11-06 NOTE — PROGRESS NOTES
Patient's labs still indicate iron deficiency anemia, unclear cause.  Order placed for colonoscopy to evaluate for bleeding in the GI system.  I also placed an order for hematology to evaluate further.  She also is showing elevated platelet levels.  Unclear cause we will have her follow-up with hematology to evaluate further.    Suzanne Cummings, APRN, CNP

## 2024-11-12 ENCOUNTER — OFFICE VISIT (OUTPATIENT)
Dept: OPTOMETRY | Facility: CLINIC | Age: 64
End: 2024-11-12
Payer: COMMERCIAL

## 2024-11-12 ENCOUNTER — VIRTUAL VISIT (OUTPATIENT)
Dept: PHARMACY | Facility: CLINIC | Age: 64
End: 2024-11-12
Payer: COMMERCIAL

## 2024-11-12 DIAGNOSIS — K59.00 CONSTIPATION, UNSPECIFIED CONSTIPATION TYPE: ICD-10-CM

## 2024-11-12 DIAGNOSIS — R42 DIZZINESS: ICD-10-CM

## 2024-11-12 DIAGNOSIS — Z78.9 TAKES DIETARY SUPPLEMENTS: ICD-10-CM

## 2024-11-12 DIAGNOSIS — F41.1 GAD (GENERALIZED ANXIETY DISORDER): ICD-10-CM

## 2024-11-12 DIAGNOSIS — H54.3 VISION LOSS, BILATERAL: ICD-10-CM

## 2024-11-12 DIAGNOSIS — E11.9 TYPE 2 DIABETES MELLITUS WITHOUT COMPLICATION, WITHOUT LONG-TERM CURRENT USE OF INSULIN (H): ICD-10-CM

## 2024-11-12 DIAGNOSIS — H25.13 NUCLEAR AGE-RELATED CATARACT, BOTH EYES: ICD-10-CM

## 2024-11-12 DIAGNOSIS — H52.4 PRESBYOPIA: ICD-10-CM

## 2024-11-12 DIAGNOSIS — H35.30 MACULAR DEGENERATION OF BOTH EYES, UNSPECIFIED TYPE: ICD-10-CM

## 2024-11-12 DIAGNOSIS — E78.5 HYPERLIPIDEMIA LDL GOAL <100: ICD-10-CM

## 2024-11-12 DIAGNOSIS — M25.562 CHRONIC PAIN OF LEFT KNEE: ICD-10-CM

## 2024-11-12 DIAGNOSIS — G89.29 CHRONIC PAIN OF LEFT KNEE: ICD-10-CM

## 2024-11-12 DIAGNOSIS — E11.9 TYPE 2 DIABETES MELLITUS WITHOUT COMPLICATION, WITHOUT LONG-TERM CURRENT USE OF INSULIN (H): Primary | ICD-10-CM

## 2024-11-12 DIAGNOSIS — F33.1 MAJOR DEPRESSIVE DISORDER, RECURRENT, MODERATE (H): ICD-10-CM

## 2024-11-12 DIAGNOSIS — Z01.01 ENCOUNTER FOR EXAMINATION OF EYES AND VISION WITH ABNORMAL FINDINGS: Primary | ICD-10-CM

## 2024-11-12 DIAGNOSIS — M81.0 AGE-RELATED OSTEOPOROSIS WITHOUT CURRENT PATHOLOGICAL FRACTURE: Primary | ICD-10-CM

## 2024-11-12 DIAGNOSIS — J30.1 ALLERGIC RHINITIS DUE TO POLLEN, UNSPECIFIED SEASONALITY: ICD-10-CM

## 2024-11-12 DIAGNOSIS — K29.50 CHRONIC GASTRITIS WITHOUT BLEEDING, UNSPECIFIED GASTRITIS TYPE: ICD-10-CM

## 2024-11-12 PROCEDURE — 92015 DETERMINE REFRACTIVE STATE: CPT | Performed by: OPTOMETRIST

## 2024-11-12 PROCEDURE — 92004 COMPRE OPH EXAM NEW PT 1/>: CPT | Performed by: OPTOMETRIST

## 2024-11-12 PROCEDURE — 99605 MTMS BY PHARM NP 15 MIN: CPT | Mod: 93 | Performed by: PHARMACIST

## 2024-11-12 ASSESSMENT — KERATOMETRY
OD_AXISANGLE_DEGREES: 087
OS_AXISANGLE_DEGREES: 046
OD_K2POWER_DIOPTERS: 47.00
OD_AXISANGLE2_DEGREES: 177
OD_K1POWER_DIOPTERS: 46.00
OS_K1POWER_DIOPTERS: 46.00
OS_K2POWER_DIOPTERS: 46.50
OS_AXISANGLE2_DEGREES: 136

## 2024-11-12 ASSESSMENT — CONF VISUAL FIELD
METHOD: COUNTING FINGERS
OS_NORMAL: 1
OD_SUPERIOR_NASAL_RESTRICTION: 0
OS_SUPERIOR_NASAL_RESTRICTION: 0
OS_INFERIOR_NASAL_RESTRICTION: 0
OD_INFERIOR_TEMPORAL_RESTRICTION: 0
OS_SUPERIOR_TEMPORAL_RESTRICTION: 0
OS_INFERIOR_TEMPORAL_RESTRICTION: 0
OD_NORMAL: 1
OD_SUPERIOR_TEMPORAL_RESTRICTION: 0
OD_INFERIOR_NASAL_RESTRICTION: 0

## 2024-11-12 ASSESSMENT — REFRACTION_MANIFEST
METHOD_AUTOREFRACTION: 1
OD_AXIS: 098
OD_CYLINDER: +0.50
OS_SPHERE: -0.75
OD_SPHERE: PLANO
OS_CYLINDER: +1.00
OS_AXIS: 001

## 2024-11-12 ASSESSMENT — EXTERNAL EXAM - RIGHT EYE: OD_EXAM: NORMAL

## 2024-11-12 ASSESSMENT — VISUAL ACUITY
OS_SC: HM
OD_SC: HM
OS_SC: FIX AND FOLLOW
OD_SC: FIX AND FOLLOW
METHOD: ALLEN PICTURES

## 2024-11-12 ASSESSMENT — TONOMETRY
OD_IOP_MMHG: 19
IOP_METHOD: APPLANATION
OS_IOP_MMHG: 18

## 2024-11-12 ASSESSMENT — EXTERNAL EXAM - LEFT EYE: OS_EXAM: NORMAL

## 2024-11-12 ASSESSMENT — SLIT LAMP EXAM - LIDS
COMMENTS: 2+ DERMATOCHALASIS
COMMENTS: 2+ DERMATOCHALASIS

## 2024-11-12 ASSESSMENT — CUP TO DISC RATIO
OS_RATIO: 0.3
OD_RATIO: 0.35

## 2024-11-12 NOTE — PATIENT INSTRUCTIONS
Macular degeneration vs diabetic retinopathy left > right.   Mild cataract each eye.    Refer to retina specialist for further evaluation.    The effects of the dilating drops last for 4- 6 hours.  You will be more sensitive to light and vision will be blurry up close.  Mydriatic sunglasses were given if needed.       Sam Borrero, OD  [address] [place]  [address]    [phone]

## 2024-11-12 NOTE — PROGRESS NOTES
Medication Therapy Management (MTM) Encounter    ASSESSMENT:                            Medication Adherence/Access: No issues identified.    Hyperlipidemia stable    Type 2 Diabetes stable - A1c at goal of <7%    Depression/Anxiety stable - continue to follow up with psychiatry.     Supplements: stable    Allergies stable    Knee Pain stable    Dizziness stable    GERD stable    Constipation stable    PLAN:                            1. Continue current drug therapy.    Follow-up: as needed    SUBJECTIVE/OBJECTIVE:                          Doug Davidson is a 64 year old female seen for a transitions of care visit. She was discharged from [facility] on 10/15/2024 for Hyponatremia.  (ID# [id number]) was used during today's visit.      Reason for visit: transitions of care.    Allergies/ADRs: Reviewed in chart  Past Medical History: Reviewed in chart  Tobacco: She reports that she quit smoking about 22 months ago. Her smoking use included cigarettes. She does not have any smokeless tobacco history on file.  Alcohol: none     Medication Adherence/Access: no issues reported.    Hyperlipidemia   Rosuvastatin 10 mg daily    Patient reports no current medication side effects.  The 10-year ASCVD risk score (Darryl AGUILAR, et al., 2019) is: 13.9%    Values used to calculate the score:      Age: 64 years      Sex: Female      Is Non- : No      Diabetic: Yes      Tobacco smoker: No      Systolic Blood Pressure: 132 mmHg      Is BP treated: No      HDL Cholesterol: 34 mg/dL      Total Cholesterol: 177 mg/dL    Recent Labs   Lab Test 10/03/24  1143 11/09/16  1015   CHOL 177 262*   HDL 34* 48*   LDL 98 176*   TRIG 227*  --      Type 2 Diabetes   Metformin XR 1000 mg with dinner    Patient reports no current medication side effects.  Blood sugar monitoring: never  Current diabetes symptoms: none  Eye exam: up to date   Foot exam: up to date    Hemoglobin A1C   Date Value Ref  Range Status   10/03/2024 6.5 (H) 0.0 - 5.6 % Final     Comment:     Normal <5.7%   Prediabetes 5.7-6.4%    Diabetes 6.5% or higher     Note: Adopted from ADA consensus guidelines.     Depression/Anxiety  Sertraline 150 mg once daily  Quetiapine 50 mg at bedtime  Trazodone 50 mg at bedtime    Followed by psychiatry - every 3-6 months. States tolerating medications well. Depression and anxiety are doing good. Sleeping well.     Supplements:   Vitamin D 2000 units daily  Ferrous sulfate 325 mg once daily    Vitamin D lab from 10/3/2024 was wnls. Tolerating iron supplement well.   Hemoglobin   Date Value Ref Range Status   11/05/2024 10.3 (L) 11.7 - 15.7 g/dL Final   11/09/2016 14.3 12.0 - 16.0 g/dL Final     Allergies  Flonase 50 mcg/actuation one spray each nostril at bedtime    Working well. Tolerating well.     Knee Pain  Gabapentin 100 mg at bedtime    Working well for pain - tolerating well.     Dizziness:  Meclizine 25 mg three times daily as needed    Taking 2-3 times daily some days - working well. Tolerating well.     GERD:  Omeprazole 20 mg daily    Working well, tolerating well.     Constipation  Senna S one tablet twice daily    Working well. Tolerating well.     BP Readings from Last 3 Encounters:   11/05/24 132/84   10/24/24 118/78   10/15/24 94/57     ----------------  Post Discharge Medication Reconciliation Status: discharge medications reconciled, continue medications without change.    I spent 28 minutes with this patient today. All changes were made via collaborative practice agreement with NEIDA Prince CNP. A copy of the visit note was provided to the patient's provider(s).    A summary of these recommendations was sent via Zimory.    Gita Ndiaye, Yoni  Medication Therapy Management     Telemedicine Visit Details  The patient's medications can be safely assessed via a telemedicine encounter.  Type of service:  Telephone visit  Originating Location (pt. Location): Home    Distant  Location (provider location):  On-site  Start Time:  1:00 PM  End Time: 1:28 PM     Medication Therapy Recommendations  No medication therapy recommendations to display

## 2024-11-12 NOTE — PROGRESS NOTES
Chief Complaint   Patient presents with    Diabetic Eye Exam    Cataract     Accompanied by iPad  [id number] and daughter in law Alayna    Chief Complaint(s) and History of Present Illness(es)       Diabetic Eye Exam              Diabetes Type: Type 2    Duration: years    Blood Sugars: Doesn't check               Cataract                    Lab Results   Component Value Date    A1C 6.5 10/03/2024       -Cataracts each eye      Last Eye Exam: 2017 HP   Dilated Previously: Yes, side effects of dilation explained today    What are you currently using to see?  does not use glasses or contacts - states she has never worn glasses of any kind     Distance Vision Acuity: Noticed gradual change in both eyes - vision extremely blurry and cloudy - ongoing for years     Near Vision Acuity: Not satisfied     Eye Comfort: itchy, watery, and sore   Do you use eye drops? : No  Occupation or Hobbies: home     Sandi García  Optometry Assistant     Medical, surgical and family histories reviewed and updated 11/12/2024.       OBJECTIVE: See Ophthalmology exam    ASSESSMENT:    ICD-10-CM    1. Encounter for examination of eyes and vision with abnormal findings  Z01.01       2. Macular degeneration of both eyes, unspecified type  H35.30       3. Vision loss, bilateral  H54.3       4. Nuclear age-related cataract, both eyes  H25.13       5. Presbyopia  H52.4           PLAN:    Doug nunez  eye exam results will be sent to Suzanne Thayer.  Patient Instructions   Macular degeneration vs diabetic retinopathy left > right.   Mild cataract each eye.    Refer to retina specialist for further evaluation.    The effects of the dilating drops last for 4- 6 hours.  You will be more sensitive to light and vision will be blurry up close.  Mydriatic sunglasses were given if needed.       Sam Borrero, OD  [facility]  [address] [place] [address]. [place]  [place]  [address]    [phone]

## 2024-11-12 NOTE — PATIENT INSTRUCTIONS
"Recommendations from today's MTM visit:                                                    MTM (medication therapy management) is a service provided by a clinical pharmacist designed to help you get the most of out of your medicines.   Today we reviewed what your medicines are for, how to know if they are working, that your medicines are safe and how to make your medicine regimen as easy as possible.      Doug,    It was a pleasure talking with you! We discussed the followin. Continue current drug therapy.    Follow-up: as needed    It was great speaking with you today.  I value your experience and would be very thankful for your time in providing feedback in our clinic survey. In the next few days, you may receive an email or text message from [employer] with a link to a survey related to your  clinical pharmacist.\"     To schedule another MTM appointment, please call the clinic directly or you may call the MTM scheduling line at [phone] or toll-free at [phone].     My Clinical Pharmacist's contact information:                                                      Please feel free to contact me with any questions or concerns you have.      Keep up the good work!!    Gita Ndiaye, PharmD  [phone] in clinic on Tuesday and Wednesday        "

## 2024-11-12 NOTE — LETTER
11/12/2024      Doug Davidson  [address] [address]      Dear Colleague,    Thank you for referring your patient, Doug Davidson, to the [facility]. Please see a copy of my visit note below.    Chief Complaint   Patient presents with     Diabetic Eye Exam     Cataract     Accompanied by iPad  [id number] and daughter in law Alayna    Chief Complaint(s) and History of Present Illness(es)       Diabetic Eye Exam              Diabetes Type: Type 2    Duration: years    Blood Sugars: Doesn't check               Cataract                    Lab Results   Component Value Date    A1C 6.5 10/03/2024       -Cataracts each eye      Last Eye Exam: 2017 HP   Dilated Previously: Yes, side effects of dilation explained today    What are you currently using to see?  does not use glasses or contacts - states she has never worn glasses of any kind     Distance Vision Acuity: Noticed gradual change in both eyes - vision extremely blurry and cloudy - ongoing for years     Near Vision Acuity: Not satisfied     Eye Comfort: itchy, watery, and sore   Do you use eye drops? : No  Occupation or Hobbies: home     Sandi García  Optometry Assistant     Medical, surgical and family histories reviewed and updated 11/12/2024.       OBJECTIVE: See Ophthalmology exam    ASSESSMENT:    ICD-10-CM    1. Encounter for examination of eyes and vision with abnormal findings  Z01.01       2. Macular degeneration of both eyes, unspecified type  H35.30       3. Vision loss, bilateral  H54.3       4. Nuclear age-related cataract, both eyes  H25.13       5. Presbyopia  H52.4           PLAN:    Doug Davidson aware  eye exam results will be sent to Suzanne Thayer.  Patient Instructions   Macular degeneration vs diabetic retinopathy left > right.   Mild cataract each eye.    Refer to retina specialist for further evaluation.    The effects of the dilating drops last for 4- 6 hours.  You will be more sensitive to  light and vision will be blurry up close.  Mydriatic sunglasses were given if needed.       Sam Borrero OD  [address] [place]  [address] [phone]            Again, thank you for allowing me to participate in the care of your patient.        Sincerely,        Sam Borrero OD

## 2024-11-13 RX ORDER — METFORMIN HYDROCHLORIDE 500 MG/1
TABLET, EXTENDED RELEASE ORAL
Qty: 210 TABLET | Refills: 0 | OUTPATIENT
Start: 2024-11-13 | End: 2025-03-12

## 2024-11-15 ENCOUNTER — HOSPITAL ENCOUNTER (OUTPATIENT)
Dept: BONE DENSITY | Facility: HOSPITAL | Age: 64
Discharge: HOME | End: 2024-11-15
Attending: NURSE PRACTITIONER
Payer: COMMERCIAL

## 2024-11-15 ENCOUNTER — HOSPITAL ENCOUNTER (OUTPATIENT)
Dept: GENERAL RADIOLOGY | Facility: HOSPITAL | Age: 64
Discharge: HOME | End: 2024-11-15
Attending: NURSE PRACTITIONER
Payer: COMMERCIAL

## 2024-11-15 DIAGNOSIS — Z22.7 LATENT TUBERCULOSIS: ICD-10-CM

## 2024-11-15 DIAGNOSIS — Z13.820 SCREENING FOR OSTEOPOROSIS: ICD-10-CM

## 2024-11-15 PROCEDURE — 77091 TBS TECHL CALCULATION ONLY: CPT

## 2024-11-15 PROCEDURE — 71046 X-RAY EXAM CHEST 2 VIEWS: CPT

## 2024-11-15 PROCEDURE — 77080 DXA BONE DENSITY AXIAL: CPT

## 2024-11-17 PROBLEM — M81.0 AGE-RELATED OSTEOPOROSIS WITHOUT CURRENT PATHOLOGICAL FRACTURE: Status: ACTIVE | Noted: 2024-11-17

## 2024-11-17 RX ORDER — ALENDRONATE SODIUM 70 MG/1
70 TABLET ORAL
Qty: 12 TABLET | Refills: 3 | Status: SHIPPED | OUTPATIENT
Start: 2024-11-17

## 2024-11-17 NOTE — ASSESSMENT & PLAN NOTE
Dexa scan shows osteoporosis.   Follow up with an appointment to discuss starting medications and lifestyle interventions.   Fosamax 70 mg every 7 days ordered and sent to the [facility] pharmacy.     EXAM: DX TBS AXIAL  LOCATION: [facility]  DATE: 11/15/2024     INDICATION: BMD screening, baseline exam.  DEMOGRAPHICS: Age- 64 years. Gender- Female. Menopausal status- Postmenopausal.  COMPARISON: No prior studies available on the current scanner.  TECHNIQUE: Dual-energy x-ray absorptiometry (DXA) performed with routine technique. Trabecular bone score (TBS) analysis performed.     FINDINGS:     DXA RESULTS  -Lumbar Spine: L1-L4: BMD: 0.719 g/cm2. T-score: -3.8. Z-score: -2.2.  -RIGHT Hip Total: BMD: 0.692 g/cm2. T-score: -2.5. Z-score: -1.2.  -RIGHT Hip Femoral neck: BMD: 0.642 g/cm2. T-score: -2.8. Z-score: -1.3.  -LEFT Hip Total: BMD: 0.683 g/cm2. T-score: -2.6. Z-score: -1.3.  -LEFT Hip Femoral neck: BMD: 0.652 g/cm2. T-score: -2.8. Z-score: -1.3.     WHO T-SCORE CRITERIA  -Normal: T score at or above -1 SD  -Osteopenia: T score between -1 and -2.5 SD  -Osteoporosis: T score at or below -2.5 SD     The World Health Organization (WHO) criteria is applicable to perimenopausal females, postmenopausal females, and men aged 50 years or older.     TBS RESULTS  -Lumbar Spine L1-L4: TBS: 1.201. TBS T-score: -2.9.TBS Z-score: -0.8.     The TBS is a DXA derived measurement for fracture risk assessment, and reflects the structural condition of the bone microarchitecture. It can be used to adjust WHO Fracture Risk Assessment Tool (FRAX) probability of fracture in postmenopausal women and   older men. The calculated probabilities of fracture have been shown to be more accurate when computed with the TBS.     INTERVAL CHANGE  -No prior studies available on the current scanner for comparison.       FRACTURE RISK  -The FRAX risk calculator is not applicable due to osteoporosis.      RECOMMENDATIONS  The patient's BMD is consistent with osteoporosis, and she is at increased fracture risk. If not currently being treated for low BMD, this would merit treatment according to the Bone Health and Osteoporosis Foundation.                                                                      IMPRESSION: OSTEOPOROSIS. T score meets the WHO criteria for osteoporosis at one or more measured sites. The risk of osteoporotic fracture increases approximately two-fold for each standard deviation decrease in T-score.

## 2024-11-17 NOTE — TELEPHONE ENCOUNTER
Age-related osteoporosis without current pathological fracture  Dexa scan shows osteoporosis.   Follow up with an appointment to discuss starting medications and lifestyle interventions.   Fosamax 70 mg every 7 days ordered and sent to the [facility] pharmacy.     EXAM: DX TBS AXIAL  LOCATION: [facility]  DATE: 11/15/2024     INDICATION: BMD screening, baseline exam.  DEMOGRAPHICS: Age- 64 years. Gender- Female. Menopausal status- Postmenopausal.  COMPARISON: No prior studies available on the current scanner.  TECHNIQUE: Dual-energy x-ray absorptiometry (DXA) performed with routine technique. Trabecular bone score (TBS) analysis performed.     FINDINGS:     DXA RESULTS  -Lumbar Spine: L1-L4: BMD: 0.719 g/cm2. T-score: -3.8. Z-score: -2.2.  -RIGHT Hip Total: BMD: 0.692 g/cm2. T-score: -2.5. Z-score: -1.2.  -RIGHT Hip Femoral neck: BMD: 0.642 g/cm2. T-score: -2.8. Z-score: -1.3.  -LEFT Hip Total: BMD: 0.683 g/cm2. T-score: -2.6. Z-score: -1.3.  -LEFT Hip Femoral neck: BMD: 0.652 g/cm2. T-score: -2.8. Z-score: -1.3.     WHO T-SCORE CRITERIA  -Normal: T score at or above -1 SD  -Osteopenia: T score between -1 and -2.5 SD  -Osteoporosis: T score at or below -2.5 SD     The World Health Organization (WHO) criteria is applicable to perimenopausal females, postmenopausal females, and men aged 50 years or older.     TBS RESULTS  -Lumbar Spine L1-L4: TBS: 1.201. TBS T-score: -2.9.TBS Z-score: -0.8.     The TBS is a DXA derived measurement for fracture risk assessment, and reflects the structural condition of the bone microarchitecture. It can be used to adjust WHO Fracture Risk Assessment Tool (FRAX) probability of fracture in postmenopausal women and   older men. The calculated probabilities of fracture have been shown to be more accurate when computed with the TBS.     INTERVAL CHANGE  -No prior studies available on the current scanner for comparison.       FRACTURE RISK  -The FRAX risk  calculator is not applicable due to osteoporosis.     RECOMMENDATIONS  The patient's BMD is consistent with osteoporosis, and she is at increased fracture risk. If not currently being treated for low BMD, this would merit treatment according to the Bone Health and Osteoporosis Foundation.                                                                      IMPRESSION: OSTEOPOROSIS. T score meets the WHO criteria for osteoporosis at one or more measured sites. The risk of osteoporotic fracture increases approximately two-fold for each standard deviation decrease in T-score.

## 2024-11-18 NOTE — TELEPHONE ENCOUNTER
RECORDS STATUS - ALL OTHER DIAGNOSIS      RECORDS RECEIVED FROM: [facility]   NOTES STATUS DETAILS   OFFICE NOTE from referring provider Epic 10/24/2024 - NEIDA Matos CNP   DISCHARGE SUMMARY from hospital [facility] 10/13/2024 - [facility]    MEDICATION LIST [facility]    LABS     PATHOLOGY REPORTS     ANYTHING RELATED TO DIAGNOSIS Epic 11/5/2024   IMAGING (NEED IMAGES & REPORT)     XRAYS PACS DX Bone Density: 11/15/2024

## 2024-12-03 ENCOUNTER — MYC REFILL (OUTPATIENT)
Dept: FAMILY MEDICINE | Facility: CLINIC | Age: 64
End: 2024-12-03
Payer: COMMERCIAL

## 2024-12-03 DIAGNOSIS — R42 DIZZINESS: ICD-10-CM

## 2024-12-03 DIAGNOSIS — J30.2 SEASONAL ALLERGIC RHINITIS, UNSPECIFIED TRIGGER: ICD-10-CM

## 2024-12-03 DIAGNOSIS — L20.9 ATOPIC DERMATITIS, UNSPECIFIED TYPE: Primary | ICD-10-CM

## 2024-12-05 RX ORDER — AMMONIUM LACTATE 12 G/100G
CREAM TOPICAL DAILY PRN
Qty: 140 G | Refills: 2 | OUTPATIENT
Start: 2024-12-05

## 2024-12-05 RX ORDER — FLUTICASONE PROPIONATE 50 MCG
1 SPRAY, SUSPENSION (ML) NASAL DAILY
Qty: 16 G | Refills: 11 | Status: SHIPPED | OUTPATIENT
Start: 2024-12-05

## 2024-12-05 RX ORDER — MECLIZINE HYDROCHLORIDE 25 MG/1
25 TABLET ORAL 3 TIMES DAILY PRN
Qty: 30 TABLET | Refills: 1 | Status: SHIPPED | OUTPATIENT
Start: 2024-12-05

## 2024-12-05 RX ORDER — BENZOCAINE/MENTHOL 6 MG-10 MG
LOZENGE MUCOUS MEMBRANE 2 TIMES DAILY PRN
Qty: 60 G | Refills: 2 | Status: SHIPPED | OUTPATIENT
Start: 2024-12-05

## 2024-12-06 ENCOUNTER — OFFICE VISIT (OUTPATIENT)
Dept: FAMILY MEDICINE | Facility: CLINIC | Age: 64
End: 2024-12-06
Payer: COMMERCIAL

## 2024-12-06 VITALS
OXYGEN SATURATION: 95 % | TEMPERATURE: 98.4 F | HEIGHT: 64 IN | WEIGHT: 137 LBS | SYSTOLIC BLOOD PRESSURE: 118 MMHG | RESPIRATION RATE: 16 BRPM | HEART RATE: 83 BPM | DIASTOLIC BLOOD PRESSURE: 80 MMHG | BODY MASS INDEX: 23.39 KG/M2

## 2024-12-06 DIAGNOSIS — D64.9 ANEMIA, UNSPECIFIED TYPE: ICD-10-CM

## 2024-12-06 DIAGNOSIS — M81.0 AGE-RELATED OSTEOPOROSIS WITHOUT CURRENT PATHOLOGICAL FRACTURE: ICD-10-CM

## 2024-12-06 DIAGNOSIS — B00.9 HERPES SIMPLEX TYPE 1 INFECTION: Primary | ICD-10-CM

## 2024-12-06 DIAGNOSIS — H57.9 ITCH OF EYE, LEFT: ICD-10-CM

## 2024-12-06 LAB
ERYTHROCYTE [DISTWIDTH] IN BLOOD BY AUTOMATED COUNT: 12.8 % (ref 10–15)
HCT VFR BLD AUTO: 32.8 % (ref 35–47)
HGB BLD-MCNC: 11.7 G/DL (ref 11.7–15.7)
MCH RBC QN AUTO: 29.1 PG (ref 26.5–33)
MCHC RBC AUTO-ENTMCNC: 35.7 G/DL (ref 31.5–36.5)
MCV RBC AUTO: 82 FL (ref 78–100)
PLATELET # BLD AUTO: 365 10E3/UL (ref 150–450)
RBC # BLD AUTO: 4.02 10E6/UL (ref 3.8–5.2)
WBC # BLD AUTO: 6.1 10E3/UL (ref 4–11)

## 2024-12-06 PROCEDURE — 36415 COLL VENOUS BLD VENIPUNCTURE: CPT | Performed by: NURSE PRACTITIONER

## 2024-12-06 PROCEDURE — 99214 OFFICE O/P EST MOD 30 MIN: CPT | Performed by: NURSE PRACTITIONER

## 2024-12-06 PROCEDURE — 85027 COMPLETE CBC AUTOMATED: CPT | Performed by: NURSE PRACTITIONER

## 2024-12-06 RX ORDER — OLOPATADINE HYDROCHLORIDE 1 MG/ML
1 SOLUTION/ DROPS OPHTHALMIC 2 TIMES DAILY
Qty: 5 ML | Refills: 0 | Status: SHIPPED | OUTPATIENT
Start: 2024-12-06

## 2024-12-06 RX ORDER — VALACYCLOVIR HYDROCHLORIDE 1 G/1
2000 TABLET, FILM COATED ORAL 2 TIMES DAILY
Qty: 4 TABLET | Refills: 0 | Status: SHIPPED | OUTPATIENT
Start: 2024-12-06 | End: 2024-12-07

## 2024-12-06 ASSESSMENT — PAIN SCALES - GENERAL: PAINLEVEL_OUTOF10: MILD PAIN (2)

## 2024-12-06 NOTE — PATIENT INSTRUCTIONS
To Schedule colonoscopy call [phone], option 2.     To strengthen bones  Vitamin D3 and K2  Weight bearing exercises; walking, jumping, weight lifting  Start Fosamax 70 mg once weekly to help increase the bone density    Anemia (low hemoglobin)  Take ferrous sulfate daily  Colonoscopy  Hematology referral if Hgb is still low    For sore on mouth  Valacyclovir 2000 mg (2 pills) twice a day for 1 day    For itching eye  Patanol eye drops twice a day

## 2024-12-06 NOTE — PROGRESS NOTES
Assessment & Plan   Problem List Items Addressed This Visit       Age-related osteoporosis without current pathological fracture     DEXA scan done 11/15/2024 shows osteoporosis in the spine, right and left hip and femoral neck.  Today we discussed what osteoporosis is as well as  Both lifestyle interventions of eating a high-protein diet, taking supplements of vitamin D3 and K2 as well as calcium, resistance training and weight bearing exercises.  Medication includes Fosamax 70 mg once weekly which she is taking currently.  Discussion was done through an .  I think there are large of cultural  barriers as well as language barriers         Anemia, unspecified type     Iron deficiency anemia unclear cause.  Could be of chronic disease.  Colonoscopy has been ordered however patient has not scheduled this.  We are managing through cultural and language barriers.  I did discuss with them again today to try to schedule that.  It seems like her hemoglobin levels are increasing with the added ferrous sulfate.  Continue to monitor.         Relevant Orders    CBC with platelets (Completed)     Other Visit Diagnoses       Herpes simplex type 1 infection    -  Primary    Itch of eye, left        Relevant Medications    olopatadine (PATANOL) 0.1 % ophthalmic solution           Herpes: Valacyclovir was given for 1 day to help heal the mouth sore.  Left eye itching unclear cause.  I do not see any redness or discharge in the eye today.  Will give Patanol eyedrop to see if that will help her eye itching.         FUTURE APPOINTMENTS:       - Follow-up visit in 3 months      Namrata Camacho is a 64 year old, presenting for the following health issues:  RECHECK (Discuss osteoporosis), Mouth Problem, and Eye Problem    HPI     Used Estelita interpretor   Osteoporosis- Dexa scan shows osteoporosis, discussion around low bone density and the various lifestyle interventions as well as medication interventions were discussed  "today.      Anemia; colonoscopy, unclear cause of her low hemoglobin levels will recheck today.  Discussed trying to do a colonoscopy to assess if there is any bleeding in the colon.  Is taking Ferrous sulfate    Concerns today  Left eye itching-no drainage, vision changes  Has a sore on the corner of her mouth.  Has never had herpetic lesions in the past.  But this lesion has not gone away.    Review of Systems  Constitutional, HEENT, cardiovascular, pulmonary, gi and gu systems are negative, except as otherwise noted.      Objective    /80 (BP Location: Right arm, Patient Position: Sitting, Cuff Size: Adult Regular)   Pulse 83   Temp 98.4  F (36.9  C) (Tympanic)   Resp 16   Ht 1.626 m (5' 4\")   Wt 62.1 kg (137 lb)   SpO2 95%   BMI 23.52 kg/m    Body mass index is 23.52 kg/m .  Physical Exam  Constitutional:       Appearance: Normal appearance.   HENT:      Head: Normocephalic.   Eyes:      General: Lids are normal.         Right eye: No foreign body or discharge.         Left eye: No foreign body or discharge.      Conjunctiva/sclera:      Right eye: Right conjunctiva is not injected.      Left eye: Left conjunctiva is not injected.      Pupils: Pupils are equal, round, and reactive to light.   Pulmonary:      Effort: Pulmonary effort is normal. No respiratory distress.   Skin:     General: Skin is warm and dry.   Neurological:      Mental Status: She is alert and oriented to person, place, and time.   Psychiatric:         Mood and Affect: Mood normal.         Behavior: Behavior normal.         Thought Content: Thought content normal.         Judgment: Judgment normal.                    Signed Electronically by: NEIDA Prince CNP    "

## 2024-12-08 PROBLEM — D64.9 ANEMIA, UNSPECIFIED TYPE: Status: ACTIVE | Noted: 2024-12-08

## 2024-12-08 NOTE — ASSESSMENT & PLAN NOTE
DEXA scan done 11/15/2024 shows osteoporosis in the spine, right and left hip and femoral neck.  Today we discussed what osteoporosis is as well as  Both lifestyle interventions of eating a high-protein diet, taking supplements of vitamin D3 and K2 as well as calcium, resistance training and weight bearing exercises.  Medication includes Fosamax 70 mg once weekly which she is taking currently.  Discussion was done through an .  I think there are large of cultural  barriers as well as language barriers

## 2024-12-08 NOTE — ASSESSMENT & PLAN NOTE
Iron deficiency anemia unclear cause.  Could be of chronic disease.  Colonoscopy has been ordered however patient has not scheduled this.  We are managing through cultural and language barriers.  I did discuss with them again today to try to schedule that.  It seems like her hemoglobin levels are increasing with the added ferrous sulfate.  Continue to monitor.   [de-identified] : AD: Hearing WNL .25-8kHz w/ excellent WRS and type A tymp.\par AS: Mild to moderately-severe SNHL .25-8kHz w/ poor WRS and type A tymp.

## 2024-12-12 ENCOUNTER — PRE VISIT (OUTPATIENT)
Dept: ONCOLOGY | Facility: CLINIC | Age: 64
End: 2024-12-12
Payer: COMMERCIAL

## 2024-12-12 ENCOUNTER — PATIENT OUTREACH (OUTPATIENT)
Dept: CARE COORDINATION | Facility: CLINIC | Age: 64
End: 2024-12-12
Payer: COMMERCIAL

## 2024-12-12 NOTE — PROGRESS NOTES
Clinic Care Coordination Contact  Community Health Worker Follow Up    Care Gaps:     Health Maintenance Due   Topic Date Due    ADVANCE CARE PLANNING  Never done    DEPRESSION ACTION PLAN  Never done    PHQ-9  Never done    RSV VACCINE (1 - Risk 60-74 years 1-dose series) Never done    ZOSTER IMMUNIZATION (2 of 2) 01/16/2020    MEDICARE ANNUAL WELLNESS VISIT  06/01/2023       Scheduled for AWV on 1/2/25 @ 8:40      Care Plan:   Care Plan: Annual Wellness Visit       Problem: HP GENERAL PROBLEM       Goal: I will complete my annual wellness visit.       This Visit's Progress: 50%    Note:     Barriers: language barrier  Strengths: family support    Patient expressed understanding of goal: yes  Action steps to achieve this goal:  1. I will schedule my annual wellness visit; [phone] ([phone]). My CHW assisted me in scheduling my AWV for 1/2/25 @ 8:40. Completed  2. I will attend my annual wellness visit. Continuous (MB 12/12/24)  3. I will contact my Care Management or clinic team if I have barriers to attending my annual wellness visit.                               Care Plan: Appointments/Referrals       Problem: HP GENERAL PROBLEM       Goal: I would like to attend the following appointments/referrals.       Start Date: 10/3/2024    This Visit's Progress: 60% Recent Progress: 20%    Note:     Barriers: language barriermpleted  Strengths: family support  Patient expressed understanding of goal: yes  Action steps to achieve this goal:  1. I will call to schedule both my Dexa Scan and my chest x-ray; [phone]. My CHW assisted me in scheduling both appointments scheduled with [facility]: Chest X ray: 11/15/24 @ 8:05 and DEXA Scan: 11/15/24 @ 8:45.-Completed  2. I will call to schedule an eye appointment. Scheduled 12/13/24 @ 10:55. Continuous (MB 12/12/24)  3. I would like to call and schedule a dental exam.  If I am unable to schedule this through the number provided on 10/16; my Community  Healthcare Worker will provide additional resources. I have received this letter and will look into locations soon.  4.  I will speak with the MTM 11/12 @ 1:00. Completed  5. Oncology 12/12/24 @ 11:00 Video visit.-Unable to get on video for visit. CHW assisted patient in rescheduling Oncology appointment @ [phone] today for 2/6/25 @ 10:00.                                Intervention and Education during outreach: CHW assisted patient in rescheduling Oncology appointment @ [phone] today for 2/6/25.     See goal for further details regarding goal progression.No other needs at this time.     CHW Plan: CHW will follow up with patient in about 1 month.    Kirstin Rodriguez  Community Health Worker  [facility] Care Coordination   [facility], [place], [place], [place] [facility]  Office: [phone]

## 2024-12-13 ENCOUNTER — TRANSFERRED RECORDS (OUTPATIENT)
Dept: HEALTH INFORMATION MANAGEMENT | Facility: CLINIC | Age: 64
End: 2024-12-13
Payer: COMMERCIAL

## 2024-12-24 ENCOUNTER — PATIENT OUTREACH (OUTPATIENT)
Dept: CARE COORDINATION | Facility: CLINIC | Age: 64
End: 2024-12-24
Payer: COMMERCIAL

## 2024-12-24 NOTE — LETTER
[facility] CARE COORDINATION  [address] [address] [address]    December 24, 2024        Doug Davidson  [address] [address]          Dear Doug,     Attached is an updated Patient Centered Plan of Care for your continued enrollment in Care Coordination. Please let us know if you have additional questions, concerns, or goals that we can assist with.    Sincerely,    Марина Short RN  Clinic Care Coordination  [phone]        [facility]  Patient Centered Plan of Care  About Me:        Patient Name:  Doug Davidson    YOB: 1960  Age:         64 year old   Sarai MRN:    [id number] Telephone Information:  Home Phone [phone]   Mobile [phone]       Address:  [address] [address] Email address:  [email or web address]      Emergency Contact(s)    Name Relationship Lgl Grd Work Phone Home Phone Mobile Phone   1. MARK HUSAIN Daughter-in-Law    [phone]   2. TEENA SOMMER Son    [phone]           Primary language:  [facility]     needed? Yes   [place] [facility]:  [phone] op. 1  Other communication barriers:Language barrier    Preferred Method of Communication:     Current living arrangement: I live in a private home with family    Mobility Status/ Medical Equipment: Independent        Health Maintenance  Health Maintenance Reviewed: Due/Overdue  (AWV, numerous screenings)      My Access Plan  Medical Emergency 911   Primary Clinic Line [facility] [phone]   24 Hour Appointment Line [phone] or  [phone] ([phone]) (toll-free)   24 Hour Nurse Line [phone] (toll-free)   Preferred Urgent Care [facility], [phone]     Preferred Hospital [facility]  [phone]     Preferred Pharmacy [facility] - [place], [place] - [address] [place] [address]     Behavioral Health Crisis Line The National Suicide Prevention Lifeline at  3-970-579-1999 or Text/Call 988           My Care Team Members  Patient Care Team         Relationship Specialty Notifications Start End    Anitra ThayerNEIDA Esposito CNP PCP - General Nurse Practitioner  9/24/24     Phone: [phone] Fax: [phone] [address] [facility] [address]    Hector ThayerNEIDA Zambrano CNP Assigned PCP   4/23/24     Phone: [phone] Fax: [phone] [address] [facility] [facility] [address]    Gisele Bob MD Assigned Musculoskeletal Provider   4/23/24     Phone: [phone] Fax: [phone]         [facility] [address] [place] [address]    Марина Short, RN Lead Care Coordinator Primary Care - CC Admissions 10/1/24     Phone: [phone]         Kirstin Rodriguez W Community Health Worker  Admissions 10/3/24     Phone: [phone]         Aicha Davis MD MD Infectious Diseases  10/10/24     Phone: [phone] Fax: [phone] [address] [facility] [address] [facility] [address]    Sam Borrero OD MD Optometrist  10/18/24     Phone: [phone] Fax: [phone] [address] [facility] [address]    Ganesh Lunsford MD MD Medical Oncology  11/6/24     Phone: [phone] Fax: [phone]         [address] [facility] [address]    [facility] Suzanne Leah, APRN CNP Nurse Practitioner Nurse Practitioner  11/6/24     Phone: [phone] Fax: [phone] [address] [facility] [address]    Gita Ndiaye [facility] Pharmacist Pharmacist  11/12/24     Phone: [phone] Fax: [phone] [address] [facility] [address]    Aicha Davis MD Assigned Infectious Disease Provider   11/23/24     Phone: [phone] Fax: [phone] [address] [address]    Sam Borrero OD Assigned Surgical Provider   11/23/24     Phone: [phone] Fax: [phone] [address] [place] [address]    Gita Ndiaye [facility] Assigned [facility]  Pharmacist   11/23/24     Phone: [phone] Fax: [phone]         [address] [facility] [address]    Friedell, Peter E, MD MD Internal Medicine-Hematology & Oncology  12/12/24     Phone: [phone] Fax: [phone]         [address] [facility] [address]                My Care Plans  Self Management and Treatment Plan    Care Plan  Care Plan: Annual Wellness Visit       Problem: HP GENERAL PROBLEM       Goal: I will complete my annual wellness visit.       This Visit's Progress: 50%    Note:     Barriers: language barrier  Strengths: family support    Patient expressed understanding of goal: yes  Action steps to achieve this goal:  1. I will schedule my annual wellness visit; [phone] ([phone]). My CHW assisted me in scheduling my AWV for 1/2/25 @ 8:40. Completed  2. I will attend my annual wellness visit. Continuous (MB 12/12/24)  3. I will contact my Care Management or clinic team if I have barriers to attending my annual wellness visit.                               Care Plan: Appointments/Referrals       Problem: HP GENERAL PROBLEM       Goal: I would like to attend the following appointments/referrals.       Start Date: 10/3/2024    Recent Progress: 60%    Note:     Barriers: language barriermpleted  Strengths: family support  Patient expressed understanding of goal: yes  Action steps to achieve this goal:  1. I would like to call and schedule a dental exam.  If I am unable to schedule this through the number provided on 10/16; my Community Healthcare Worker will provide additional resources. I have received this letter and will look into locations soon.  2. Eye appt-was referred to Retina Specialist for further eval has she attended this?  3. Oncology appointment @ [phone] today for 2/6/25 @ 9:45                                Action Plans on File:                       Advance Care Plans/Directives:   Advanced Care Plan/Directives on file: No    Discussed with  patient/caregiver(s): Declined Further Information             My Medical and Care Information  Problem List   Patient Active Problem List   Diagnosis    Essential hypertension    CRISTINO (generalized anxiety disorder)    Gastritis, chronic    Heme positive stool    Hyperlipidemia LDL goal <100    History of Helicobacter pylori infection    Latent tuberculosis    Strongyloides stercoralis infection    Major depressive disorder, recurrent, moderate (H)    Tobacco use disorder    Pain in joint, ankle and foot, right    Type 2 diabetes mellitus without complication, without long-term current use of insulin (H)    Other insomnia    Dizziness    Hyponatremia    Syncope, unspecified syncope type    Age-related osteoporosis without current pathological fracture    Anemia, unspecified type      Current Medications:  Please refer to the most recent medication list provided to you by your medical team and reach out to your provider with any questions or to make any corrections.    Care Coordination Start Date: 9/26/2024   Frequency of Care Coordination: monthly, more frequently as needed     Form Last Updated: 12/24/2024

## 2024-12-24 NOTE — PROGRESS NOTES
Clinic Care Coordination Contact  Care Coordination Clinician Chart Review    Situation: Patient chart reviewed by Care Coordinator.       Background: Care Coordination Program started: 9/26/2024. Initial assessment completed and patient-centered care plan(s) were developed with participation from patient. Lead CC handed patient off to CHW for continued outreaches.       Assessment: Per chart review, patient outreach completed by CC CHW on 12/12/24.  Patient is actively working to accomplish goal(s). Patient's goal(s) appropriate and relevant at this time. Patient is due for updated Plan of Care.  Assessments will be completed annually or as needed/with change of patient status.      Care Plan: Annual Wellness Visit       Problem: HP GENERAL PROBLEM       Goal: I will complete my annual wellness visit.       This Visit's Progress: 50%    Note:     Barriers: language barrier  Strengths: family support    Patient expressed understanding of goal: yes  Action steps to achieve this goal:  1. I will schedule my annual wellness visit; [phone] ([phone]). My CHW assisted me in scheduling my AWV for 1/2/25 @ 8:40. Completed  2. I will attend my annual wellness visit. Continuous (MB 12/12/24)  3. I will contact my Care Management or clinic team if I have barriers to attending my annual wellness visit.                               Care Plan: Appointments/Referrals       Problem: HP GENERAL PROBLEM       Goal: I would like to attend the following appointments/referrals.       Start Date: 10/3/2024    Recent Progress: 60%    Note:     Barriers: language barriermpleted  Strengths: family support  Patient expressed understanding of goal: yes  Action steps to achieve this goal:  1. I would like to call and schedule a dental exam.  If I am unable to schedule this through the number provided on 10/16; my Community Healthcare Worker will provide additional resources. I have received this letter and will look into locations  soon.  2. Eye appt-was referred to Retina Specialist for further eval has she attended this?  3. Oncology appointment @ [phone] today for 2/6/25 @ 9:45                                   Plan/Recommendations: The patient will continue working with Care Coordination to achieve goal(s) as above. CHW will continue outreaches at minimum every 30 days and will involve Lead CC as needed or if patient is ready to move to Maintenance. Lead CC will continue to monitor CHW outreaches and patient's progress to goal(s) every 6 weeks.     Plan of Care updated and sent to patient: Yes, via [employer]

## 2025-01-11 ENCOUNTER — MYC REFILL (OUTPATIENT)
Dept: FAMILY MEDICINE | Facility: CLINIC | Age: 65
End: 2025-01-11
Payer: COMMERCIAL

## 2025-01-11 DIAGNOSIS — F41.1 GAD (GENERALIZED ANXIETY DISORDER): Primary | ICD-10-CM

## 2025-01-11 DIAGNOSIS — E78.00 HIGH CHOLESTEROL: ICD-10-CM

## 2025-01-11 DIAGNOSIS — R42 DIZZINESS: ICD-10-CM

## 2025-01-11 DIAGNOSIS — K29.50 CHRONIC GASTRITIS WITHOUT BLEEDING, UNSPECIFIED GASTRITIS TYPE: ICD-10-CM

## 2025-01-12 ENCOUNTER — HEALTH MAINTENANCE LETTER (OUTPATIENT)
Age: 65
End: 2025-01-12

## 2025-01-13 DIAGNOSIS — E11.9 TYPE 2 DIABETES MELLITUS WITHOUT COMPLICATION, WITHOUT LONG-TERM CURRENT USE OF INSULIN (H): ICD-10-CM

## 2025-01-13 RX ORDER — ROSUVASTATIN CALCIUM 10 MG/1
10 TABLET, COATED ORAL DAILY
Qty: 90 TABLET | Refills: 3 | OUTPATIENT
Start: 2025-01-13

## 2025-01-13 RX ORDER — MECLIZINE HYDROCHLORIDE 25 MG/1
25 TABLET ORAL 3 TIMES DAILY PRN
Qty: 30 TABLET | Refills: 1 | Status: SHIPPED | OUTPATIENT
Start: 2025-01-13

## 2025-01-14 RX ORDER — METFORMIN HYDROCHLORIDE 500 MG/1
1000 TABLET, EXTENDED RELEASE ORAL
Qty: 180 TABLET | Refills: 0 | Status: SHIPPED | OUTPATIENT
Start: 2025-01-14

## 2025-01-14 NOTE — TELEPHONE ENCOUNTER
Prescription approved per [facility] Refill Protocol.  
Normal vision: sees adequately in most situations; can see medication labels, newsprint

## 2025-01-15 ENCOUNTER — TELEPHONE (OUTPATIENT)
Dept: GASTROENTEROLOGY | Facility: CLINIC | Age: 65
End: 2025-01-15
Payer: COMMERCIAL

## 2025-01-15 RX ORDER — GABAPENTIN 100 MG/1
100 CAPSULE ORAL AT BEDTIME
Qty: 90 CAPSULE | Refills: 3 | Status: SHIPPED | OUTPATIENT
Start: 2025-01-15

## 2025-01-15 NOTE — TELEPHONE ENCOUNTER
"Endoscopy Scheduling Screen    Have you had any respiratory illness or flu-like symptoms in the last 10 days?  No    What is your communication preference for Instructions and/or Bowel Prep?   MyChart    What insurance is in the chart?  Other:  [facility]    Ordering/Referring Provider: Suzanne Thayer APRN CNP in [facility] PR[facility]   (If ordering provider performs procedure, schedule with ordering provider unless otherwise instructed. )    BMI: Estimated body mass index is 26.56 kg/m  as calculated from the following:    Height as of 12/12/24: 1.524 m (5').    Weight as of 12/12/24: 61.7 kg (136 lb).     Sedation Ordered  moderate sedation.   If patient BMI > 50 do not schedule in [facility].    If patient BMI > 45 do not schedule at [facility].    Are you taking methadone or Suboxone?  NO, No RN review required.    Have you been diagnosed and are being treated for severe PTSD or severe anxiety?  NO, No RN review required.    Are you taking any prescription medications for pain 3 or more times per week?   NO, No RN review required.    Do you have a history of malignant hyperthermia?  No    (Females) Are you currently pregnant?   No     Have you been diagnosed or told you have pulmonary hypertension?   No    Do you have an LVAD?  No    Have you been told you have moderate to severe sleep apnea?  No.    Have you been told you have COPD, asthma, or any other lung disease?  No    Do you have any heart conditions?  No     Have you ever had or are you waiting for an organ transplant?  No. Continue scheduling, no site restrictions.    Have you had a stroke or transient ischemic attack (TIA aka \"mini stroke\" in the last 6 months?   No    Have you been diagnosed with or been told you have cirrhosis of the liver?   No.    Are you currently on dialysis?   No    Do you need assistance transferring?   No    BMI: Estimated body mass index is 26.56 kg/m  as calculated from the following:    Height as of 12/12/24: 1.524 m (5').    Weight as " of 12/12/24: 61.7 kg (136 lb).     Is patients BMI > 40 and scheduling location [facility]?  No    Do you take an injectable or oral medication for weight loss or diabetes (excluding insulin)?  Yes, hold time can be up to 7 days. Please consult with you prescribing provider to discuss endoscopy recommendations. (Please schedule at least 7 days out.)    Do you take the medication Naltrexone?  No    Do you take blood thinners?  No       Prep   Are you currently on dialysis or do you have chronic kidney disease?  No    Do you have a diagnosis of diabetes?  Yes (Golytely Prep)    Do you have a diagnosis of cystic fibrosis (CF)?  No    On a regular basis do you go 3 -5 days between bowel movements?  Yes (Extended Prep)    BMI > 40?  No    Preferred Pharmacy:    [facility] [place] [address]  [address]  [facility] [address]  Phone: [phone] Fax: [phone]      Final Scheduling Details     Procedure scheduled  Colonoscopy    Surgeon:       Date of procedure:  1.29.25     Pre-OP / PAC:   No - Not required for this site.    Location  [facility] - [facility] - Patient preference.    Sedation   Moderate Sedation - Per order.      Patient Reminders:   You will receive a call from a Nurse to review instructions and health history.  This assessment must be completed prior to your procedure.  Failure to complete the Nurse assessment may result in the procedure being cancelled.      On the day of your procedure, please designate an adult(s) who can drive you home stay with you for the next 24 hours. The medicines used in the exam will make you sleepy. You will not be able to drive.      You cannot take public transportation, ride share services, or non-medical taxi service without a responsible caregiver.  Medical transport services are allowed with the requirement that a responsible caregiver will receive you at your destination.  We require that drivers and caregivers are confirmed prior to your  procedure.

## 2025-01-16 ENCOUNTER — PATIENT OUTREACH (OUTPATIENT)
Dept: CARE COORDINATION | Facility: CLINIC | Age: 65
End: 2025-01-16
Payer: COMMERCIAL

## 2025-01-16 NOTE — PROGRESS NOTES
Clinic Care Coordination Contact  [facility]/Voicemail    Clinical Data: Care Coordinator Outreach    Outreach Documentation Number of Outreach Attempt   1/16/2025  12:51 PM 1     Left message on patient's voicemail with call back information and requested return call.    Plan: Care Coordinator will try to reach patient again in 10 business days.    Kirstin Rodriguez  [facility] Health Worker  [facility] Care Coordination   Agency, [facility], [facility], [facility]  Office: [phone]

## 2025-01-28 ENCOUNTER — ANESTHESIA EVENT (OUTPATIENT)
Dept: SURGERY | Facility: AMBULATORY SURGERY CENTER | Age: 65
End: 2025-01-28
Payer: COMMERCIAL

## 2025-01-28 NOTE — ANESTHESIA PREPROCEDURE EVALUATION
"Anesthesia Pre-Procedure Evaluation    Patient: Doug Davidson   MRN: [id number] : 1960        Procedure : Procedure(s):  Colonoscopy          Past Medical History:   Diagnosis Date    Diabetes (H)     Tobacco use disorder 2023    Formatting of this note might be different from the original.   2 cig per day        No past surgical history on file.   No Known Allergies   Social History     Tobacco Use    Smoking status: Former     Current packs/day: 0.00     Types: Cigarettes     Quit date:      Years since quittin.0    Smokeless tobacco: Not on file   Substance Use Topics    Alcohol use: Not on file      Wt Readings from Last 1 Encounters:   24 61.7 kg (136 lb)           Physical Exam    Airway        Mallampati: III   TM distance: > 3 FB   Neck ROM: full   Mouth opening: > 3 cm    Respiratory Devices and Support         Dental       (+) Edentulous and Removable bridges or other hardware      Cardiovascular   cardiovascular exam normal          Pulmonary   pulmonary exam normal                OUTSIDE LABS:  CBC:   Lab Results   Component Value Date    WBC 6.1 2024    WBC 5.2 2024    HGB 11.7 2024    HGB 10.3 (L) 2024    HCT 32.8 (L) 2024    HCT 31.4 (L) 2024     2024     (H) 2024     BMP:   Lab Results   Component Value Date     2024     (L) 10/24/2024    POTASSIUM 4.4 2024    POTASSIUM 4.0 10/24/2024    CHLORIDE 102 2024    CHLORIDE 99 10/24/2024    CO2 23 2024    CO2 24 10/24/2024    BUN 12.8 2024    BUN 6.2 (L) 10/24/2024    CR 0.75 2024    CR 0.68 10/24/2024     (H) 2024     (H) 10/24/2024     COAGS:   Lab Results   Component Value Date    PTT 27 2022    INR 1.03 2022     POC: No results found for: \"BGM\", \"HCG\", \"HCGS\"  HEPATIC:   Lab Results   Component Value Date    ALBUMIN 3.8 2024    PROTTOTAL 7.5 2024    ALT 8 2024    AST 23 " 11/05/2024    ALKPHOS 95 11/05/2024    BILITOTAL 0.2 11/05/2024     OTHER:   Lab Results   Component Value Date    A1C 6.5 (H) 10/03/2024    KANE 9.6 11/05/2024    MAG 2.3 10/15/2024    LIPASE 42 02/24/2021    TSH 4.22 (H) 10/13/2024    T4 1.05 10/13/2024       Anesthesia Plan    ASA Status:  2    NPO Status:  NPO Appropriate    Anesthesia Type: MAC.     - Reason for MAC: straight local not clinically adequate   Induction: Intravenous, Propofol.   Maintenance: TIVA.        Consents    Anesthesia Plan(s) and associated risks, benefits, and realistic alternatives discussed. Questions answered and patient/representative(s) expressed understanding.     - Discussed: Risks, Benefits and Alternatives for BOTH SEDATION and the PROCEDURE were discussed     - Discussed with:  Patient, , Other (See Comment) (daughter in law)      - Extended Intubation/Ventilatory Support Discussed: No.      - Patient is DNR/DNI Status: No     Use of blood products discussed: No .     Postoperative Care       PONV prophylaxis: Ondansetron (or other 5HT-3), Background Propofol Infusion     Comments:               Bill Cox MD    I have reviewed the pertinent notes and labs in the chart from the past 30 days and (re)examined the patient.  Any updates or changes from those notes are reflected in this note.    Clinically Significant Risk Factors Present on Admission                   # Hypertension: Noted on problem list          # DMII: A1C = N/A within past 6 months        # Financial/Environmental Concerns:

## 2025-01-29 ENCOUNTER — ANESTHESIA (OUTPATIENT)
Dept: SURGERY | Facility: AMBULATORY SURGERY CENTER | Age: 65
End: 2025-01-29
Payer: COMMERCIAL

## 2025-01-29 ENCOUNTER — HOSPITAL ENCOUNTER (OUTPATIENT)
Facility: AMBULATORY SURGERY CENTER | Age: 65
Discharge: HOME | End: 2025-01-29
Attending: INTERNAL MEDICINE
Payer: COMMERCIAL

## 2025-01-29 VITALS
HEART RATE: 71 BPM | OXYGEN SATURATION: 96 % | DIASTOLIC BLOOD PRESSURE: 95 MMHG | BODY MASS INDEX: 30.42 KG/M2 | WEIGHT: 141 LBS | RESPIRATION RATE: 18 BRPM | HEIGHT: 57 IN | TEMPERATURE: 97.2 F | SYSTOLIC BLOOD PRESSURE: 168 MMHG

## 2025-01-29 VITALS — HEART RATE: 80 BPM

## 2025-01-29 LAB
COLONOSCOPY: NORMAL
GLUCOSE BLDC GLUCOMTR-MCNC: 113 MG/DL (ref 70–99)

## 2025-01-29 PROCEDURE — 82962 GLUCOSE BLOOD TEST: CPT | Performed by: PATHOLOGY

## 2025-01-29 PROCEDURE — 88305 TISSUE EXAM BY PATHOLOGIST: CPT | Mod: TC | Performed by: INTERNAL MEDICINE

## 2025-01-29 PROCEDURE — 88305 TISSUE EXAM BY PATHOLOGIST: CPT | Mod: 26 | Performed by: PATHOLOGY

## 2025-01-29 RX ORDER — LIDOCAINE 40 MG/G
CREAM TOPICAL
Status: DISCONTINUED | OUTPATIENT
Start: 2025-01-29 | End: 2025-01-29 | Stop reason: HOSPADM

## 2025-01-29 RX ORDER — NALOXONE HYDROCHLORIDE 0.4 MG/ML
0.2 INJECTION, SOLUTION INTRAMUSCULAR; INTRAVENOUS; SUBCUTANEOUS
Status: DISCONTINUED | OUTPATIENT
Start: 2025-01-29 | End: 2025-01-30 | Stop reason: HOSPADM

## 2025-01-29 RX ORDER — ONDANSETRON 2 MG/ML
4 INJECTION INTRAMUSCULAR; INTRAVENOUS
Status: DISCONTINUED | OUTPATIENT
Start: 2025-01-29 | End: 2025-01-29 | Stop reason: HOSPADM

## 2025-01-29 RX ORDER — ONDANSETRON 4 MG/1
4 TABLET, ORALLY DISINTEGRATING ORAL EVERY 6 HOURS PRN
Status: DISCONTINUED | OUTPATIENT
Start: 2025-01-29 | End: 2025-01-30 | Stop reason: HOSPADM

## 2025-01-29 RX ORDER — NALOXONE HYDROCHLORIDE 0.4 MG/ML
0.4 INJECTION, SOLUTION INTRAMUSCULAR; INTRAVENOUS; SUBCUTANEOUS
Status: DISCONTINUED | OUTPATIENT
Start: 2025-01-29 | End: 2025-01-30 | Stop reason: HOSPADM

## 2025-01-29 RX ORDER — SODIUM CHLORIDE, SODIUM LACTATE, POTASSIUM CHLORIDE, CALCIUM CHLORIDE 600; 310; 30; 20 MG/100ML; MG/100ML; MG/100ML; MG/100ML
INJECTION, SOLUTION INTRAVENOUS CONTINUOUS PRN
Status: DISCONTINUED | OUTPATIENT
Start: 2025-01-29 | End: 2025-01-29

## 2025-01-29 RX ORDER — ONDANSETRON 2 MG/ML
4 INJECTION INTRAMUSCULAR; INTRAVENOUS EVERY 6 HOURS PRN
Status: DISCONTINUED | OUTPATIENT
Start: 2025-01-29 | End: 2025-01-30 | Stop reason: HOSPADM

## 2025-01-29 RX ORDER — FLUMAZENIL 0.1 MG/ML
0.2 INJECTION, SOLUTION INTRAVENOUS
Status: ACTIVE | OUTPATIENT
Start: 2025-01-29 | End: 2025-01-29

## 2025-01-29 RX ORDER — LIDOCAINE HYDROCHLORIDE 20 MG/ML
INJECTION, SOLUTION INFILTRATION; PERINEURAL PRN
Status: DISCONTINUED | OUTPATIENT
Start: 2025-01-29 | End: 2025-01-29

## 2025-01-29 RX ORDER — PROPOFOL 10 MG/ML
INJECTION, EMULSION INTRAVENOUS PRN
Status: DISCONTINUED | OUTPATIENT
Start: 2025-01-29 | End: 2025-01-29

## 2025-01-29 RX ORDER — PROPOFOL 10 MG/ML
INJECTION, EMULSION INTRAVENOUS CONTINUOUS PRN
Status: DISCONTINUED | OUTPATIENT
Start: 2025-01-29 | End: 2025-01-29

## 2025-01-29 RX ORDER — PROCHLORPERAZINE MALEATE 5 MG/1
5 TABLET ORAL EVERY 6 HOURS PRN
Status: DISCONTINUED | OUTPATIENT
Start: 2025-01-29 | End: 2025-01-30 | Stop reason: HOSPADM

## 2025-01-29 RX ADMIN — PROPOFOL 100 MG: 10 INJECTION, EMULSION INTRAVENOUS at 11:25

## 2025-01-29 RX ADMIN — SODIUM CHLORIDE, SODIUM LACTATE, POTASSIUM CHLORIDE, CALCIUM CHLORIDE: 600; 310; 30; 20 INJECTION, SOLUTION INTRAVENOUS at 11:21

## 2025-01-29 RX ADMIN — PROPOFOL 150 MCG/KG/MIN: 10 INJECTION, EMULSION INTRAVENOUS at 11:25

## 2025-01-29 RX ADMIN — LIDOCAINE HYDROCHLORIDE 60 MG: 20 INJECTION, SOLUTION INFILTRATION; PERINEURAL at 11:25

## 2025-01-29 NOTE — H&P
Doug Davidson  [id number]  female  64 year old      Reason for procedure/surgery: CASANDRA, hx of FIT negative 10/24    Patient Active Problem List   Diagnosis    Essential hypertension    CRISTINO (generalized anxiety disorder)    Gastritis, chronic    Heme positive stool    Hyperlipidemia LDL goal <100    History of Helicobacter pylori infection    Latent tuberculosis    Strongyloides stercoralis infection    Major depressive disorder, recurrent, moderate (H)    Tobacco use disorder    Pain in joint, ankle and foot, right    Type 2 diabetes mellitus without complication, without long-term current use of insulin (H)    Other insomnia    Dizziness    Hyponatremia    Syncope, unspecified syncope type    Age-related osteoporosis without current pathological fracture    Anemia, unspecified type       Past Surgical History:  No past surgical history on file.    Past Medical History:   Past Medical History:   Diagnosis Date    Diabetes (H)     Tobacco use disorder 2023    Formatting of this note might be different from the original.   2 cig per day         Social History:   Social History     Tobacco Use    Smoking status: Former     Current packs/day: 0.00     Types: Cigarettes     Quit date:      Years since quittin.0    Smokeless tobacco: Not on file   Substance Use Topics    Alcohol use: Not on file       Family History:   Family History   Problem Relation Age of Onset    Colon Cancer No family hx of     Breast Cancer No family hx of        Allergies: No Known Allergies    Active Medications:   Current Outpatient Medications   Medication Sig Dispense Refill    alendronate (FOSAMAX) 70 MG tablet Take 1 tablet (70 mg) by mouth every 7 days. 12 tablet 3    ammonium lactate (AMLACTIN) 12 % external cream Apply liberally to dry skin daily.      bisacodyl (DULCOLAX) 5 MG EC tablet Two days prior to exam take two (2) tablets at 4pm. One day prior to exam take two (2) tablets at 4pm 4 tablet 0    Cholecalciferol (VITAMIN  D) 50 MCG (2000 UT) CAPS Take 1 tablet by mouth daily.      ferrous sulfate (FEROSUL) 325 (65 Fe) MG tablet Take 1 tablet (325 mg) by mouth daily (with breakfast). 90 tablet 3    fluticasone (FLONASE) 50 MCG/ACT nasal spray Spray 1 spray into both nostrils daily. 16 g 11    gabapentin (NEURONTIN) 100 MG capsule Take 1 capsule (100 mg) by mouth at bedtime. 90 capsule 3    hydrocortisone (CORTAID) 1 % external cream Apply topically 2 times daily as needed for rash or itching. 60 g 2    meclizine (ANTIVERT) 25 MG tablet Take 1 tablet (25 mg) by mouth 3 times daily as needed for dizziness. 30 tablet 1    metFORMIN (GLUCOPHAGE XR) 500 MG 24 hr tablet Take 2 tablets (1,000 mg) by mouth daily (with dinner). 180 tablet 0    olopatadine (PATANOL) 0.1 % ophthalmic solution Place 1 drop Into the left eye 2 times daily. 5 mL 0    omeprazole (PRILOSEC) 20 MG DR capsule Take 1 capsule (20 mg) by mouth daily. 90 capsule 3    polyethylene glycol (GOLYTELY) 236 g suspension Two days before procedure at 5PM fill first container with water. Mix and drink an 8 oz glass every 15 minutes until HALF of the container is gone. Place the remainder in the refrigerator. One day before procedure at 5PM drink second half of bowel prep. Drink an 8 oz glass every 15 minutes until it is gone. Day of procedure 6 hours before arrival time fill the 2nd container with water. Mix and drink an 8 oz glass every 15 minutes until HALF of the container is gone. Discard the remaining solution. 8000 mL 0    QUEtiapine (SEROQUEL) 50 MG tablet Take 1 tablet (50 mg) by mouth at bedtime. 90 tablet 3    rosuvastatin (CRESTOR) 10 MG tablet Take 1 tablet (10 mg) by mouth daily. 90 tablet 3    SENEXON-S 8.6-50 MG tablet Take 1 tablet by mouth 2 times daily      sertraline (ZOLOFT) 100 MG tablet Take 1 tablet (100 mg) by mouth daily. 90 tablet 3    sertraline (ZOLOFT) 50 MG tablet Take 1 tablet (50 mg) by mouth daily. 90 tablet 3    traZODone (DESYREL) 50 MG tablet  Take 1 tablet (50 mg) by mouth at bedtime. 30 tablet 0    valACYclovir (VALTREX) 1000 mg tablet Take 2 tablets (2,000 mg) by mouth 2 times daily for 1 day. 4 tablet 0       Systemic Review:   CONSTITUTIONAL: NEGATIVE for fever, chills, change in weight  ENT/MOUTH: NEGATIVE for ear, mouth and throat problems  RESP: NEGATIVE for significant cough or SOB  CV: NEGATIVE for chest pain, palpitations or peripheral edema    Physical Examination:   Vital Signs: There were no vitals taken for this visit.  GENERAL: healthy, alert and no distress  NECK: no adenopathy, no asymmetry, masses, or scars  RESP: lungs clear to auscultation - no rales, rhonchi or wheezes  CV: regular rate and rhythm, normal S1 S2, no S3 or S4, no murmur, click or rub, no peripheral edema and peripheral pulses strong  ABDOMEN: soft, nontender, no hepatosplenomegaly, no masses and bowel sounds normal  MS: no gross musculoskeletal defects noted, no edema    Plan: Appropriate to proceed as scheduled.      Steph Morrison MD  1/28/2025    PCP:  Suzanne Thayer

## 2025-01-29 NOTE — ANESTHESIA POSTPROCEDURE EVALUATION
Patient: Doug Davidson    Procedure: Procedure(s):  COLONOSCOPY, WITH POLYPECTOMY       Anesthesia Type:  MAC    Note:  Disposition: Outpatient   Postop Pain Control: Uneventful            Sign Out: Well controlled pain   PONV: No   Neuro/Psych: Uneventful            Sign Out: Acceptable/Baseline neuro status   Airway/Respiratory: Uneventful            Sign Out: Acceptable/Baseline resp. status   CV/Hemodynamics: Uneventful            Sign Out: Acceptable CV status; No obvious hypovolemia; No obvious fluid overload   Other NRE:    DID A NON-ROUTINE EVENT OCCUR?            Last vitals:  Vitals Value Taken Time   /95 01/29/25 1215   Temp 36.2  C (97.2  F) 01/29/25 1215   Pulse 71 01/29/25 1215   Resp 18 01/29/25 1215   SpO2 96 % 01/29/25 1215       Electronically Signed By: Bill Cox MD  January 29, 2025  12:20 PM

## 2025-01-29 NOTE — ANESTHESIA CARE TRANSFER NOTE
Patient: Doug Davidson    Procedure: Procedure(s):  COLONOSCOPY, WITH POLYPECTOMY       Diagnosis: Iron deficiency anemia, unspecified iron deficiency anemia type [D50.9]  Diagnosis Additional Information: No value filed.    Anesthesia Type:   MAC     Note:    Oropharynx: oropharynx clear of all foreign objects  Level of Consciousness: drowsy  Oxygen Supplementation: room air    Independent Airway: airway patency satisfactory and stable  Dentition: dentition unchanged  Vital Signs Stable: post-procedure vital signs reviewed and stable  Report to RN Given: handoff report given  Patient transferred to: Phase II    Handoff Report: Identifed the Patient, Identified the Reponsible Provider, Reviewed the pertinent medical history, Discussed the surgical course, Reviewed Intra-OP anesthesia mangement and issues during anesthesia, Set expectations for post-procedure period and Allowed opportunity for questions and acknowledgement of understanding      Vitals:  Vitals Value Taken Time   /95 01/29/25 1215   Temp 36.2  C (97.2  F) 01/29/25 1215   Pulse 71 01/29/25 1215   Resp 18 01/29/25 1215   SpO2 96 % 01/29/25 1215       Electronically Signed By: Bill Cox MD  January 29, 2025  12:21 PM

## 2025-01-30 LAB
PATH REPORT.COMMENTS IMP SPEC: NORMAL
PATH REPORT.COMMENTS IMP SPEC: NORMAL
PATH REPORT.FINAL DX SPEC: NORMAL
PATH REPORT.GROSS SPEC: NORMAL
PATH REPORT.MICROSCOPIC SPEC OTHER STN: NORMAL
PATH REPORT.RELEVANT HX SPEC: NORMAL
PHOTO IMAGE: NORMAL

## 2025-02-04 ENCOUNTER — PATIENT OUTREACH (OUTPATIENT)
Dept: CARE COORDINATION | Facility: CLINIC | Age: 65
End: 2025-02-04
Payer: COMMERCIAL

## 2025-02-04 NOTE — PROGRESS NOTES
Clinic Care Coordination Contact  Community Health Worker Follow Up    Care Gaps:     Health Maintenance Due   Topic Date Due    ADVANCE CARE PLANNING  Never done    DEPRESSION ACTION PLAN  Never done    PHQ-9  Never done    RSV VACCINE (1 - Risk 60-74 years 1-dose series) Never done    ZOSTER IMMUNIZATION (2 of 2) 01/16/2020    MEDICARE ANNUAL WELLNESS VISIT  06/01/2023    A1C  01/03/2025       CHW assisted with rescheduling Preventative Care visit.    Care Plan:   Care Plan: Annual Wellness Visit       Problem: HP GENERAL PROBLEM       Goal: I will complete my annual wellness visit.       This Visit's Progress: 50% Recent Progress: 50%    Note:     Barriers: language barrier  Strengths: family support    Patient expressed understanding of goal: yes  Action steps to achieve this goal:  1. I will schedule my annual wellness visit; [phone] ([phone]). My CHW assisted me in scheduling my AWV for 1/2/25 @ 8:40. Completed  2. I will attend my annual wellness visit. Continuous (MB 12/12/24)  3. I will contact my Care Management or clinic team if I have barriers to attending my annual wellness visit.                               Care Plan: Appointments/Referrals       Problem: HP GENERAL PROBLEM       Goal: I would like to attend the following appointments/referrals.       Start Date: 10/3/2024    This Visit's Progress: 60% Recent Progress: 60%    Note:     Barriers: language barriermpleted  Strengths: family support  Patient expressed understanding of goal: yes  Action steps to achieve this goal:  1. I would like to call and schedule a dental exam.  If I am unable to schedule this through the number provided on 10/16; my Community Healthcare Worker will provide additional resources. I have received this letter and will look into locations soon.  2. Eye appt-was referred to Retina Specialist for further eval has she attended this?  3. Oncology appointment @ [phone] today for 2/6/25 @ 9:45  4.  Needs PCP  follow up in March.                                Intervention and Education during outreach:     CHW was able to connect with the Patient and introduce self/reason for call.   Per chart review, Patient did not attend 1/2/2025 appointment for a Preventative Visit. Inquired if they would like to reschedule and are agreeable; Patient is rescheduled for 3/3/2025    Writer reminded Patient of upcoming appointment:   2/6/2025 10:00 AM (Arrive by 9:45 AM) Friedell, Peter E, MD; [facility]  [facility]     No additional CC needs identified during the time of call.      CHW Plan: CHW will reach out to the Patient in 1 month to monitor the progression of their goals.        Greta DASILVA. [facility] Community Health Worker  Ambulatory Care Coordination  Covering for Kirstin NIXON

## 2025-02-04 NOTE — PROGRESS NOTES
Clinic Care Coordination Contact  Care Coordination Clinician Chart Review    Situation: Patient chart reviewed by Care Coordinator.       Background: Care Coordination Program started: 9/26/2024. Initial assessment completed and patient-centered care plan(s) were developed with participation from patient. Lead CC handed patient off to CHW for continued outreaches.       Assessment: Per chart review, patient outreach completed by CC CHW on 1/16/25.  Patient is actively working to accomplish goal(s). Patient's goal(s) appropriate and relevant at this time. Patient is not due for updated Plan of Care.  Assessments will be completed annually or as needed/with change of patient status.      Care Plan: Annual Wellness Visit       Problem: HP GENERAL PROBLEM       Goal: I will complete my annual wellness visit.       This Visit's Progress: 50%    Note:     Barriers: language barrier  Strengths: family support    Patient expressed understanding of goal: yes  Action steps to achieve this goal:  1. I will schedule my annual wellness visit; [phone] ([phone]). My CHW assisted me in scheduling my AWV for 1/2/25 @ 8:40. Completed  2. I will attend my annual wellness visit. Continuous (MB 12/12/24)  3. I will contact my Care Management or clinic team if I have barriers to attending my annual wellness visit.                               Care Plan: Appointments/Referrals       Problem: HP GENERAL PROBLEM       Goal: I would like to attend the following appointments/referrals.       Start Date: 10/3/2024    Recent Progress: 60%    Note:     Barriers: language barriermpleted  Strengths: family support  Patient expressed understanding of goal: yes  Action steps to achieve this goal:  1. I would like to call and schedule a dental exam.  If I am unable to schedule this through the number provided on 10/16; my Community Healthcare Worker will provide additional resources. I have received this letter and will look into locations  soon.  2. Eye appt-was referred to Retina Specialist for further eval has she attended this?  3. Oncology appointment @ [phone] today for 2/6/25 @ 9:45  4.  Needs PCP follow up in March.                                   Plan/Recommendations: The patient will continue working with Care Coordination to achieve goal(s) as above. CHW will continue outreaches at minimum every 30 days and will involve Lead CC as needed or if patient is ready to move to Maintenance. Lead CC will continue to monitor CHW outreaches and patient's progress to goal(s) every 6 weeks.     Plan of Care updated and sent to patient: No

## 2025-02-06 ENCOUNTER — LAB (OUTPATIENT)
Dept: INFUSION THERAPY | Facility: HOSPITAL | Age: 65
End: 2025-02-06
Attending: INTERNAL MEDICINE
Payer: COMMERCIAL

## 2025-02-06 DIAGNOSIS — D50.9 IRON DEFICIENCY ANEMIA, UNSPECIFIED IRON DEFICIENCY ANEMIA TYPE: ICD-10-CM

## 2025-02-06 LAB
ERYTHROCYTE [DISTWIDTH] IN BLOOD BY AUTOMATED COUNT: 12.5 % (ref 10–15)
FERRITIN SERPL-MCNC: 173 NG/ML (ref 11–328)
HCT VFR BLD AUTO: 35 % (ref 35–47)
HGB BLD-MCNC: 11.9 G/DL (ref 11.7–15.7)
IRON BINDING CAPACITY (ROCHE): 237 UG/DL (ref 240–430)
IRON SATN MFR SERPL: 43 % (ref 15–46)
IRON SERPL-MCNC: 101 UG/DL (ref 37–145)
MCH RBC QN AUTO: 29.9 PG (ref 26.5–33)
MCHC RBC AUTO-ENTMCNC: 34 G/DL (ref 31.5–36.5)
MCV RBC AUTO: 88 FL (ref 78–100)
PLATELET # BLD AUTO: 387 10E3/UL (ref 150–450)
RBC # BLD AUTO: 3.98 10E6/UL (ref 3.8–5.2)
WBC # BLD AUTO: 7 10E3/UL (ref 4–11)

## 2025-02-06 PROCEDURE — 83550 IRON BINDING TEST: CPT

## 2025-02-06 PROCEDURE — 85018 HEMOGLOBIN: CPT

## 2025-02-06 PROCEDURE — 83540 ASSAY OF IRON: CPT

## 2025-02-06 PROCEDURE — 36415 COLL VENOUS BLD VENIPUNCTURE: CPT

## 2025-02-06 PROCEDURE — 85041 AUTOMATED RBC COUNT: CPT

## 2025-02-06 PROCEDURE — 82728 ASSAY OF FERRITIN: CPT

## 2025-02-10 ENCOUNTER — PATIENT OUTREACH (OUTPATIENT)
Dept: ONCOLOGY | Facility: HOSPITAL | Age: 65
End: 2025-02-10
Payer: COMMERCIAL

## 2025-02-10 NOTE — PROGRESS NOTES
[facility]: Cancer Care                                                                                          Via Language Line, I call patient per Dr. Friedell's instruction. Patient is to stop taking her iron now and we will check it again in 6 weeks.     Patient verbalized understanding and appreciation of our conversation. She will be back in clinic on Thurs, 3/20.      Signature:  Avelina Dickens RN Care Coordinator  [facility]  Cancer Care [facility]

## 2025-02-12 PROBLEM — D12.6 ADENOMATOUS POLYP OF COLON: Status: ACTIVE | Noted: 2025-02-12

## 2025-02-18 ENCOUNTER — HOSPITAL ENCOUNTER (EMERGENCY)
Facility: HOSPITAL | Age: 65
Discharge: HOME | End: 2025-02-18
Attending: EMERGENCY MEDICINE | Admitting: EMERGENCY MEDICINE
Payer: COMMERCIAL

## 2025-02-18 ENCOUNTER — APPOINTMENT (OUTPATIENT)
Dept: MRI IMAGING | Facility: HOSPITAL | Age: 65
End: 2025-02-18
Attending: EMERGENCY MEDICINE
Payer: COMMERCIAL

## 2025-02-18 VITALS
SYSTOLIC BLOOD PRESSURE: 162 MMHG | RESPIRATION RATE: 16 BRPM | OXYGEN SATURATION: 97 % | WEIGHT: 145 LBS | DIASTOLIC BLOOD PRESSURE: 89 MMHG | HEART RATE: 69 BPM | TEMPERATURE: 98.4 F | HEIGHT: 57 IN | BODY MASS INDEX: 31.28 KG/M2

## 2025-02-18 DIAGNOSIS — R42 VERTIGO: ICD-10-CM

## 2025-02-18 LAB
ALBUMIN SERPL BCG-MCNC: 4.3 G/DL (ref 3.5–5.2)
ALP SERPL-CCNC: 93 U/L (ref 40–150)
ALT SERPL W P-5'-P-CCNC: 19 U/L (ref 0–50)
ANION GAP SERPL CALCULATED.3IONS-SCNC: 12 MMOL/L (ref 7–15)
AST SERPL W P-5'-P-CCNC: 39 U/L (ref 0–45)
BASOPHILS # BLD AUTO: 0.1 10E3/UL (ref 0–0.2)
BASOPHILS NFR BLD AUTO: 1 %
BILIRUB SERPL-MCNC: 0.4 MG/DL
BUN SERPL-MCNC: 11.5 MG/DL (ref 8–23)
CALCIUM SERPL-MCNC: 9.1 MG/DL (ref 8.8–10.4)
CHLORIDE SERPL-SCNC: 98 MMOL/L (ref 98–107)
CREAT SERPL-MCNC: 0.59 MG/DL (ref 0.51–0.95)
EGFRCR SERPLBLD CKD-EPI 2021: >90 ML/MIN/1.73M2
EOSINOPHIL # BLD AUTO: 0.1 10E3/UL (ref 0–0.7)
EOSINOPHIL NFR BLD AUTO: 1 %
ERYTHROCYTE [DISTWIDTH] IN BLOOD BY AUTOMATED COUNT: 12.4 % (ref 10–15)
GLUCOSE SERPL-MCNC: 114 MG/DL (ref 70–99)
HCO3 SERPL-SCNC: 21 MMOL/L (ref 22–29)
HCT VFR BLD AUTO: 35.4 % (ref 35–47)
HGB BLD-MCNC: 11.7 G/DL (ref 11.7–15.7)
IMM GRANULOCYTES # BLD: 0 10E3/UL
IMM GRANULOCYTES NFR BLD: 0 %
LYMPHOCYTES # BLD AUTO: 3.2 10E3/UL (ref 0.8–5.3)
LYMPHOCYTES NFR BLD AUTO: 34 %
MCH RBC QN AUTO: 29.3 PG (ref 26.5–33)
MCHC RBC AUTO-ENTMCNC: 33.1 G/DL (ref 31.5–36.5)
MCV RBC AUTO: 89 FL (ref 78–100)
MONOCYTES # BLD AUTO: 0.7 10E3/UL (ref 0–1.3)
MONOCYTES NFR BLD AUTO: 8 %
NEUTROPHILS # BLD AUTO: 5.3 10E3/UL (ref 1.6–8.3)
NEUTROPHILS NFR BLD AUTO: 56 %
NRBC # BLD AUTO: 0 10E3/UL
NRBC BLD AUTO-RTO: 0 /100
PLATELET # BLD AUTO: 368 10E3/UL (ref 150–450)
POTASSIUM SERPL-SCNC: 5.1 MMOL/L (ref 3.4–5.3)
PROT SERPL-MCNC: 7.8 G/DL (ref 6.4–8.3)
RBC # BLD AUTO: 3.99 10E6/UL (ref 3.8–5.2)
SODIUM SERPL-SCNC: 131 MMOL/L (ref 135–145)
WBC # BLD AUTO: 9.3 10E3/UL (ref 4–11)

## 2025-02-18 PROCEDURE — 70551 MRI BRAIN STEM W/O DYE: CPT

## 2025-02-18 PROCEDURE — 85025 COMPLETE CBC W/AUTO DIFF WBC: CPT | Performed by: EMERGENCY MEDICINE

## 2025-02-18 PROCEDURE — 36415 COLL VENOUS BLD VENIPUNCTURE: CPT | Performed by: EMERGENCY MEDICINE

## 2025-02-18 PROCEDURE — 82040 ASSAY OF SERUM ALBUMIN: CPT | Performed by: EMERGENCY MEDICINE

## 2025-02-18 PROCEDURE — 250N000013 HC RX MED GY IP 250 OP 250 PS 637: Performed by: EMERGENCY MEDICINE

## 2025-02-18 PROCEDURE — 99284 EMERGENCY DEPT VISIT MOD MDM: CPT | Mod: 25

## 2025-02-18 RX ORDER — MECLIZINE HCL 12.5 MG 12.5 MG/1
25 TABLET ORAL ONCE
Status: COMPLETED | OUTPATIENT
Start: 2025-02-18 | End: 2025-02-18

## 2025-02-18 RX ORDER — DIAZEPAM 2 MG/1
2 TABLET ORAL ONCE
Status: COMPLETED | OUTPATIENT
Start: 2025-02-18 | End: 2025-02-18

## 2025-02-18 RX ORDER — MECLIZINE HCL 12.5 MG 12.5 MG/1
12.5 TABLET ORAL 4 TIMES DAILY PRN
Qty: 30 TABLET | Refills: 0 | Status: SHIPPED | OUTPATIENT
Start: 2025-02-18

## 2025-02-18 RX ADMIN — DIAZEPAM 2 MG: 2 TABLET ORAL at 13:17

## 2025-02-18 RX ADMIN — MECLIZINE HYDROCHLORIDE 25 MG: 12.5 TABLET ORAL at 13:17

## 2025-02-18 ASSESSMENT — ACTIVITIES OF DAILY LIVING (ADL)
ADLS_ACUITY_SCORE: 57

## 2025-02-18 ASSESSMENT — COLUMBIA-SUICIDE SEVERITY RATING SCALE - C-SSRS
2. HAVE YOU ACTUALLY HAD ANY THOUGHTS OF KILLING YOURSELF IN THE PAST MONTH?: NO
1. IN THE PAST MONTH, HAVE YOU WISHED YOU WERE DEAD OR WISHED YOU COULD GO TO SLEEP AND NOT WAKE UP?: NO
6. HAVE YOU EVER DONE ANYTHING, STARTED TO DO ANYTHING, OR PREPARED TO DO ANYTHING TO END YOUR LIFE?: NO

## 2025-02-18 NOTE — ED TRIAGE NOTES
"Patient arrives to ED for evaluation of dizziness x3 days with position change. Also endorses frontal headache and ear fullness. Has not taken anything for pain but did take her \"dizzy pill\" at home.     Triage Assessment (Adult)       Row Name 02/18/25 1046          Triage Assessment    Airway WDL WDL        Respiratory WDL    Respiratory WDL WDL        Cardiac WDL    Cardiac WDL WDL        Cognitive/Neuro/Behavioral WDL    Cognitive/Neuro/Behavioral WDL WDL                     "

## 2025-02-18 NOTE — ED PROVIDER NOTES
EMERGENCY DEPARTMENT ENCOUNTER            IMPRESSION:  Vertigo        MEDICAL DECISION MAKING:  It was my pleasure to provide care for Doug Davidson who presented for evaluation of vertigo.  She has a history of chronic vertigo but has been evaluated inpatient    On my exam patient is pleasant and cooperative.   Vital signs show hypertension.  Physical exam notable for no nystagmus.  Normal neurologic exam.     Meclizine and Valium administered.  With some improvement    Laboratory investigation independently interpreted by myself and notable for mild hyponatremia, otherwise normal chemistry and CBC    MRI imaging of the brain is normal.  Imaging independently interpreted by myself and reveals no evidence of    Symptoms improved with the above intervention      Patient was reevaluated and results were discussed.     ED evaluation is consistent with benign peripheral.      I recommended follow-up with ENT, follow-up outpatient for blood pressure management, meclizine for      Prior to making a final disposition on this patient the results of patient's tests and other diagnostic studies were discussed with the patient. All questions were answered. Patient expressed understanding of the plan and was amenable.    Return precautions and follow-up were discussed.     =================================================================  CHIEF COMPLAINT:  Chief Complaint   Patient presents with    Headache    Dizziness         HPI  Doug Davidson is a 64 year old female with a history of type 2 diabetes and hypertension who presents to the ED by walk in for evaluation of headache and dizziness.    Per chart review, from 10/13/2023 to 10/15/2024 at [facility], the patient was admitted for a weakness and fall. CTA of neck was negative. D-dimer was mildly elevated.  TTE was WNL with EF 55-60%. She was given 1 L saline bolus for hyponatremia. For her dizziness, she was recommended to get a brain MRI, but the patient and  family refused. She was recommended to take meclizine and follow up with ENT and neurology. She was recommended to stop hydrochlorothiazide and Nrovasc.    Last night, the patient was lying down and developed a superior headache, increased ear pressure, left ear tinnitus, and decreased hearing in her left ear. The headache radiates to the back. Recently, she has had difficulty with balance. Now, she has balance issus, dizziness, lightheadedness, ear pressure, and intermittent nausea. Her headache has resolved. The lightheadedness is provoked by sitting and walking. She notes that has a history of dizziness and it has not been provoked by anything. The patient has a history of type 2 diabetes and hypertension. She denies room-spinning sensation, ear pain, nasal congestion, vomiting, chest pain, shortness of breath, rhinorrhea, cough, cold symptoms, bowel movement issues, and urinary issues.    Per daughter-in-law. The patient came to [facility] in October for dizziness. She stayed in the hospital for 3 days and was given IV medications. Her hypertension mediations were stopped then.    REVIEW OF SYSTEMS  Constitutional: Does not report chills, unintentional weight loss or fatigue   Eyes: Does not report visual changes or discharge    HENT: Reports tinnitus and decreased hearing in left ear, bilateral ear pressure. Does not report sore throat, ear pain or neck pain  Respiratory: Does not report cough or shortness of breath    Cardiovascular: Does not report chest pain, palpitations or leg swelling  GI: Reports intermittent nausea. Does not report abdominal pain, nausea, vomiting, or dark, bloody stools.    : Does not report hematuria, dysuria, or flank pain  Musculoskeletal: Does not report any new musculoskeletal pain or new muscle/joint pains  Skin: Does not report rash or wound  Neurologic: Reports balance issues,  past headache, lightheadedness, and dizziness. Does not report current headache, new weakness,  focal weakness    Remainder of systems reviewed, unless noted in HPI all others negative.      PAST MEDICAL HISTORY:  Past Medical History:   Diagnosis Date    Diabetes (H)     Tobacco use disorder 2023    Formatting of this note might be different from the original.   2 cig per day         PAST SURGICAL HISTORY:  Past Surgical History:   Procedure Laterality Date    COLONOSCOPY N/A 2025    Procedure: COLONOSCOPY, WITH POLYPECTOMY;  Surgeon: Steph Morrison MD;  Location: [facility] OR         CURRENT MEDICATIONS:    meclizine (ANTIVERT) 12.5 MG tablet  alendronate (FOSAMAX) 70 MG tablet  ammonium lactate (AMLACTIN) 12 % external cream  bisacodyl (DULCOLAX) 5 MG EC tablet  Cholecalciferol (VITAMIN D) 50 MCG (2000 UT) CAPS  ferrous sulfate (FEROSUL) 325 (65 Fe) MG tablet  fluticasone (FLONASE) 50 MCG/ACT nasal spray  gabapentin (NEURONTIN) 100 MG capsule  hydrocortisone (CORTAID) 1 % external cream  meclizine (ANTIVERT) 25 MG tablet  metFORMIN (GLUCOPHAGE XR) 500 MG 24 hr tablet  olopatadine (PATANOL) 0.1 % ophthalmic solution  omeprazole (PRILOSEC) 20 MG DR capsule  polyethylene glycol (GOLYTELY) 236 g suspension  QUEtiapine (SEROQUEL) 50 MG tablet  rosuvastatin (CRESTOR) 10 MG tablet  SENEXON-S 8.6-50 MG tablet  sertraline (ZOLOFT) 100 MG tablet  sertraline (ZOLOFT) 50 MG tablet  traZODone (DESYREL) 50 MG tablet  valACYclovir (VALTREX) 1000 mg tablet        ALLERGIES:  No Known Allergies    FAMILY HISTORY:  Family History   Problem Relation Age of Onset    Colon Cancer No family hx of     Breast Cancer No family hx of        SOCIAL HISTORY:   Social History     Socioeconomic History    Marital status:    Tobacco Use    Smoking status: Former     Current packs/day: 0.00     Types: Cigarettes     Quit date:      Years since quittin.1   Vaping Use    Vaping status: Former     Social Drivers of Health     Financial Resource Strain: Low Risk  (10/13/2024)    Financial Resource Strain     Within the  "past 12 months, have you or your family members you live with been unable to get utilities (heat, electricity) when it was really needed?: No   Food Insecurity: Low Risk  (10/13/2024)    Food Insecurity     Within the past 12 months, did you worry that your food would run out before you got money to buy more?: No     Within the past 12 months, did the food you bought just not last and you didn t have money to get more?: No   Transportation Needs: Low Risk  (10/13/2024)    Transportation Needs     Within the past 12 months, has lack of transportation kept you from medical appointments, getting your medicines, non-medical meetings or appointments, work, or from getting things that you need?: No   Interpersonal Safety: Low Risk  (1/29/2025)    Interpersonal Safety     Do you feel physically and emotionally safe where you currently live?: Yes     Within the past 12 months, have you been hit, slapped, kicked or otherwise physically hurt by someone?: No     Within the past 12 months, have you been humiliated or emotionally abused in other ways by your partner or ex-partner?: No   Housing Stability: High Risk (10/13/2024)    Housing Stability     Do you have housing? : No     Are you worried about losing your housing?: No       PHYSICAL EXAM:    BP (!) 162/89   Pulse 69   Temp 98.4  F (36.9  C)   Resp 16   Ht 1.448 m (4' 9\")   Wt 65.8 kg (145 lb)   SpO2 97%   BMI 31.38 kg/m        Constitutional: Awake, alert,    Head: Normocephalic, atraumatic.  ENT: Mucous membranes are moist.  No pallor.   Eyes: Pupils are reactive.  No discoloration.  No nystagmus  Neck: No lymphadenopathy, no stridor, supple, no soft tissue swelling  Chest: No tenderness   Respiratory: Respirations even, unlabored. Lungs clear to ascultation bilaterally, in no acute respiratory distress.  Cardiovascular: Regular rate and rhythm.  Good overall perfusion.  Upper and lower extremity pulses are equal.  GI: Abdomen soft, non-tender to palpation.  No " guarding or rebound. Bowel sounds present throughout.   Back: No CVA tenderness.    Musculoskeletal: Moves all 4 extremities equally, full function and capacity no peripheral edema.  Full function  Integument: Warm, dry. No rash. No bruising or petechiae.  Neurologic: Alert & oriented x 3. Normal speech. Grossly normal motor and sensory function. No focal deficits noted.  Cranial nerves intact. Steady gait and station. NIHSS = 0  Psychiatric: Normal mood and affect.  Appropriate judgement.    ED COURSE:  11:44 AM Awa, Medical student, met with the patient and conducted the initial exam and interview.  12:38 PM Awa, Medical student, updated me on the patient's interview.  4:36 PM I revisited patient and discussed discharge instructions.        Medical Decision Making    History:  Supplemental history from: daughter-in-law  External Record(s) reviewed: External medical records including care everywhere reviewed: 10/13/2023 to 10/15/2024 at [facility]    Work Up:  EKG, laboratory and imaging studies as ordered were independently interpreted by myself.   Broad differential diagnosis considered for vertigo  The patient's presentation was of high complexity.       Complicating factors:  Patient has a complicated past medical history including Type 2 diabetes and hypertension  Care affected by social determinants of health: Access to primary care    Disposition involved shared decision-making with the patient.    Admission was considered for diverting however after discussion with the patient and evidence of clinical improvement patient was felt safe for discharge home.    The patient was prescribed medication meclizine    Patient otherwise to continue outpatient medications as prescribed.     [unfilled]      LAB:  Laboratory results were independently reviewed and interpreted  Results for orders placed or performed during the hospital encounter of 02/18/25   MR Brain w/o Contrast    Impression    IMPRESSION:    1.  No acute intracranial finding. No evidence for recent ischemia, intracranial hemorrhage, or mass.  2.  A few T2 hyperintense foci in the white matter are nonspecific but most commonly reflect gliosis related to prior ischemic or inflammatory insult. They have been reported in the setting of migraines.     Comprehensive metabolic panel   Result Value Ref Range    Sodium 131 (L) 135 - 145 mmol/L    Potassium 5.1 3.4 - 5.3 mmol/L    Carbon Dioxide (CO2) 21 (L) 22 - 29 mmol/L    Anion Gap 12 7 - 15 mmol/L    Urea Nitrogen 11.5 8.0 - 23.0 mg/dL    Creatinine 0.59 0.51 - 0.95 mg/dL    GFR Estimate >90 >60 mL/min/1.73m2    Calcium 9.1 8.8 - 10.4 mg/dL    Chloride 98 98 - 107 mmol/L    Glucose 114 (H) 70 - 99 mg/dL    Alkaline Phosphatase 93 40 - 150 U/L    AST 39 0 - 45 U/L    ALT 19 0 - 50 U/L    Protein Total 7.8 6.4 - 8.3 g/dL    Albumin 4.3 3.5 - 5.2 g/dL    Bilirubin Total 0.4 <=1.2 mg/dL   CBC with platelets and differential   Result Value Ref Range    WBC Count 9.3 4.0 - 11.0 10e3/uL    RBC Count 3.99 3.80 - 5.20 10e6/uL    Hemoglobin 11.7 11.7 - 15.7 g/dL    Hematocrit 35.4 35.0 - 47.0 %    MCV 89 78 - 100 fL    MCH 29.3 26.5 - 33.0 pg    MCHC 33.1 31.5 - 36.5 g/dL    RDW 12.4 10.0 - 15.0 %    Platelet Count 368 150 - 450 10e3/uL    % Neutrophils 56 %    % Lymphocytes 34 %    % Monocytes 8 %    % Eosinophils 1 %    % Basophils 1 %    % Immature Granulocytes 0 %    NRBCs per 100 WBC 0 <1 /100    Absolute Neutrophils 5.3 1.6 - 8.3 10e3/uL    Absolute Lymphocytes 3.2 0.8 - 5.3 10e3/uL    Absolute Monocytes 0.7 0.0 - 1.3 10e3/uL    Absolute Eosinophils 0.1 0.0 - 0.7 10e3/uL    Absolute Basophils 0.1 0.0 - 0.2 10e3/uL    Absolute Immature Granulocytes 0.0 <=0.4 10e3/uL    Absolute NRBCs 0.0 10e3/uL         RADIOLOGY:  Radiology reports were independently reviewed and interpreted  MR Brain w/o Contrast   Final Result   IMPRESSION:    1.  No acute intracranial finding. No evidence for recent ischemia, intracranial  hemorrhage, or mass.   2.  A few T2 hyperintense foci in the white matter are nonspecific but most commonly reflect gliosis related to prior ischemic or inflammatory insult. They have been reported in the setting of migraines.              EKG:    ECG results from 10/13/24   ECG 12-LEAD WITH MUSE (LHE)     Value    Systolic Blood Pressure 175    Diastolic Blood Pressure 107    Ventricular Rate 79    Atrial Rate 79    NC Interval 178    QRS Duration 84        QTc 499    P Axis 42    R AXIS -23    T Axis 43    Interpretation ECG      Sinus rhythm  Prolonged QT  Abnormal ECG  When compared with ECG of 01-May-2022 13:26,  No significant change was found  Confirmed by SEE ED PROVIDER NOTE FOR, ECG INTERPRETATION ([id number]),  AHSAN ARGUETA ([id number]) on 10/14/2024 12:17:23 AM         I have independently reviewed and interpreted the EKG(s) documented above.        MEDICATIONS GIVEN IN THE EMERGENCY:  Medications   meclizine (ANTIVERT) tablet 25 mg (25 mg Oral $Given 2/18/25 1317)   diazepam (VALIUM) tablet 2 mg (2 mg Oral $Given 2/18/25 1317)           NEW PRESCRIPTIONS STARTED AT TODAY'S ER VISIT:  Discharge Medication List as of 2/18/2025  5:22 PM        START taking these medications    Details   !! meclizine (ANTIVERT) 12.5 MG tablet Take 1 tablet (12.5 mg) by mouth 4 times daily as needed for dizziness., Disp-30 tablet, R-0, E-Prescribe       !! - Potential duplicate medications found. Please discuss with provider.                   FINAL DIAGNOSIS:    ICD-10-CM    1. Vertigo  R42                  NAME: Doug Davidson  AGE: 64 year old female  YOB: 1960  MRN: [id number]  EVALUATION DATE & TIME: No admission date for patient encounter.    PCP: Suzanne Thayer    ED PROVIDER: RIGO Marroquin Dipali Arora, am serving as a scribe to document services personally performed by Dr. Brijesh Bess based on my observation and the provider's statements to me. IBrijesh MD attest that Misa  Aura is acting in a scribe capacity, has observed my performance of the services and has documented them in accordance with my direction.    Brijesh Bess M.D.  Emergency Medicine  [facility] EMERGENCY DEPARTMENT  [address] [place] [id number]  [phone]  Dept: [phone]  2/18/2025         Brijesh Bess MD  02/18/25 2317

## 2025-02-18 NOTE — DISCHARGE INSTRUCTIONS
Your MRI is negative  Meclizine prescribed for symptom  Follow-up with outpatient ENT  Your blood pressure does elevated.  I recommend outpatient evaluation for starting blood pressure medication

## 2025-03-03 ENCOUNTER — OFFICE VISIT (OUTPATIENT)
Dept: FAMILY MEDICINE | Facility: CLINIC | Age: 65
End: 2025-03-03
Payer: COMMERCIAL

## 2025-03-03 VITALS
WEIGHT: 142 LBS | RESPIRATION RATE: 16 BRPM | BODY MASS INDEX: 30.63 KG/M2 | OXYGEN SATURATION: 98 % | HEART RATE: 64 BPM | HEIGHT: 57 IN | TEMPERATURE: 96.7 F | SYSTOLIC BLOOD PRESSURE: 120 MMHG | DIASTOLIC BLOOD PRESSURE: 80 MMHG

## 2025-03-03 DIAGNOSIS — Z00.00 ROUTINE GENERAL MEDICAL EXAMINATION AT A HEALTH CARE FACILITY: Primary | ICD-10-CM

## 2025-03-03 DIAGNOSIS — E11.9 TYPE 2 DIABETES MELLITUS WITHOUT COMPLICATION, WITHOUT LONG-TERM CURRENT USE OF INSULIN (H): ICD-10-CM

## 2025-03-03 DIAGNOSIS — I10 ESSENTIAL HYPERTENSION: ICD-10-CM

## 2025-03-03 DIAGNOSIS — Z23 NEED FOR VACCINATION: ICD-10-CM

## 2025-03-03 DIAGNOSIS — F17.200 TOBACCO USE DISORDER: ICD-10-CM

## 2025-03-03 DIAGNOSIS — E11.59 TYPE 2 DIABETES MELLITUS WITH OTHER CIRCULATORY COMPLICATION, WITHOUT LONG-TERM CURRENT USE OF INSULIN (H): ICD-10-CM

## 2025-03-03 DIAGNOSIS — F33.1 MAJOR DEPRESSIVE DISORDER, RECURRENT, MODERATE (H): ICD-10-CM

## 2025-03-03 DIAGNOSIS — Z12.31 VISIT FOR SCREENING MAMMOGRAM: ICD-10-CM

## 2025-03-03 DIAGNOSIS — Z00.00 HEALTHCARE MAINTENANCE: ICD-10-CM

## 2025-03-03 DIAGNOSIS — M81.0 AGE-RELATED OSTEOPOROSIS WITHOUT CURRENT PATHOLOGICAL FRACTURE: ICD-10-CM

## 2025-03-03 DIAGNOSIS — I10 BENIGN ESSENTIAL HYPERTENSION: ICD-10-CM

## 2025-03-03 LAB
ANION GAP SERPL CALCULATED.3IONS-SCNC: 11 MMOL/L (ref 7–15)
BUN SERPL-MCNC: 18.6 MG/DL (ref 8–23)
CALCIUM SERPL-MCNC: 9.9 MG/DL (ref 8.8–10.4)
CHLORIDE SERPL-SCNC: 101 MMOL/L (ref 98–107)
CREAT SERPL-MCNC: 0.82 MG/DL (ref 0.51–0.95)
EGFRCR SERPLBLD CKD-EPI 2021: 78 ML/MIN/1.73M2
EST. AVERAGE GLUCOSE BLD GHB EST-MCNC: 157 MG/DL
GLUCOSE SERPL-MCNC: 115 MG/DL (ref 70–99)
HBA1C MFR BLD: 7.1 % (ref 0–5.6)
HCO3 SERPL-SCNC: 23 MMOL/L (ref 22–29)
POTASSIUM SERPL-SCNC: 4.9 MMOL/L (ref 3.4–5.3)
SODIUM SERPL-SCNC: 135 MMOL/L (ref 135–145)

## 2025-03-03 PROCEDURE — 3074F SYST BP LT 130 MM HG: CPT | Performed by: NURSE PRACTITIONER

## 2025-03-03 PROCEDURE — 36415 COLL VENOUS BLD VENIPUNCTURE: CPT | Performed by: NURSE PRACTITIONER

## 2025-03-03 PROCEDURE — 99397 PER PM REEVAL EST PAT 65+ YR: CPT | Performed by: NURSE PRACTITIONER

## 2025-03-03 PROCEDURE — 99214 OFFICE O/P EST MOD 30 MIN: CPT | Mod: 25 | Performed by: NURSE PRACTITIONER

## 2025-03-03 PROCEDURE — 80048 BASIC METABOLIC PNL TOTAL CA: CPT | Performed by: NURSE PRACTITIONER

## 2025-03-03 PROCEDURE — 83036 HEMOGLOBIN GLYCOSYLATED A1C: CPT | Performed by: NURSE PRACTITIONER

## 2025-03-03 PROCEDURE — 1125F AMNT PAIN NOTED PAIN PRSNT: CPT | Performed by: NURSE PRACTITIONER

## 2025-03-03 PROCEDURE — 3079F DIAST BP 80-89 MM HG: CPT | Performed by: NURSE PRACTITIONER

## 2025-03-03 RX ORDER — LISINOPRIL 2.5 MG/1
2.5 TABLET ORAL DAILY
Qty: 90 TABLET | Refills: 1 | Status: SHIPPED | OUTPATIENT
Start: 2025-03-03

## 2025-03-03 RX ORDER — LISINOPRIL 5 MG/1
5 TABLET ORAL DAILY
Qty: 90 TABLET | Refills: 3 | Status: CANCELLED | OUTPATIENT
Start: 2025-03-03

## 2025-03-03 ASSESSMENT — ACTIVITIES OF DAILY LIVING (ADL)
CURRENT_FUNCTION: PREPARING MEALS REQUIRES ASSISTANCE
CURRENT_FUNCTION: TELEPHONE REQUIRES ASSISTANCE
CURRENT_FUNCTION: HOUSEWORK REQUIRES ASSISTANCE
CURRENT_FUNCTION: MEDICATION ADMINISTRATION REQUIRES ASSISTANCE
CURRENT_FUNCTION: TRANSPORTATION REQUIRES ASSISTANCE
CURRENT_FUNCTION: SHOPPING REQUIRES ASSISTANCE
CURRENT_FUNCTION: BATHING REQUIRES ASSISTANCE
CURRENT_FUNCTION: LAUNDRY REQUIRES ASSISTANCE

## 2025-03-03 ASSESSMENT — PAIN SCALES - GENERAL: PAINLEVEL_OUTOF10: MODERATE PAIN (6)

## 2025-03-03 NOTE — ASSESSMENT & PLAN NOTE
Lab Results   Component Value Date    A1C 6.5 10/03/2024     Medications: 2000 mg once daily increase the dose to to 2000 mg as her A1c has gone above 7.1.  Consider  adding Actos in the future if needed.  Nutrition: recommend a low carbohydrate whole foods high-protein diet  Exercise: Recommend some cardiorespiratory fitness activity and resistance training  Statin: rosuvastatin 10 mg  Aspirin: Will discuss next visit  ACE/ARB: restart Lisinopril 2. 5 mg to keep BP under 130/80 if dizzy can discontinue  Eye Exam: completed  Foot Exam: done 10/2024

## 2025-03-03 NOTE — PATIENT INSTRUCTIONS
Patient Education   Preventive Care Advice   This is general advice given by our system to help you stay healthy. However, your care team may have specific advice just for you. Please talk to your care team about your preventive care needs.  Nutrition  Eat 5 or more servings of fruits and vegetables each day.  Try wheat bread, brown rice and whole grain pasta (instead of white bread, rice, and pasta).  Get enough calcium and vitamin D. Check the label on foods and aim for 100% of the RDA (recommended daily allowance).  Lifestyle  Exercise at least 150 minutes each week  (30 minutes a day, 5 days a week).  Do muscle strengthening activities 2 days a week. These help control your weight and prevent disease.  No smoking.  Wear sunscreen to prevent skin cancer.  Have a dental exam and cleaning every 6 months.  Yearly exams  See your health care team every year to talk about:  Any changes in your health.  Any medicines your care team has prescribed.  Preventive care, family planning, and ways to prevent chronic diseases.  Shots (vaccines)   HPV shots (up to age 26), if you've never had them before.  Hepatitis B shots (up to age 59), if you've never had them before.  COVID-19 shot: Get this shot when it's due.  Flu shot: Get a flu shot every year.  Tetanus shot: Get a tetanus shot every 10 years.  Pneumococcal, hepatitis A, and RSV shots: Ask your care team if you need these based on your risk.  Shingles shot (for age 50 and up)  General health tests  Diabetes screening:  Starting at age 35, Get screened for diabetes at least every 3 years.  If you are younger than age 35, ask your care team if you should be screened for diabetes.  Cholesterol test: At age 39, start having a cholesterol test every 5 years, or more often if advised.  Bone density scan (DEXA): At age 50, ask your care team if you should have this scan for osteoporosis (brittle bones).  Hepatitis C: Get tested at least once in your life.  STIs (sexually  transmitted infections)  Before age 24: Ask your care team if you should be screened for STIs.  After age 24: Get screened for STIs if you're at risk. You are at risk for STIs (including HIV) if:  You are sexually active with more than one person.  You don't use condoms every time.  You or a partner was diagnosed with a sexually transmitted infection.  If you are at risk for HIV, ask about PrEP medicine to prevent HIV.  Get tested for HIV at least once in your life, whether you are at risk for HIV or not.  Cancer screening tests  Cervical cancer screening: If you have a cervix, begin getting regular cervical cancer screening tests starting at age 21.  Breast cancer scan (mammogram): If you've ever had breasts, begin having regular mammograms starting at age 40. This is a scan to check for breast cancer.  Colon cancer screening: It is important to start screening for colon cancer at age 45.  Have a colonoscopy test every 10 years (or more often if you're at risk) Or, ask your provider about stool tests like a FIT test every year or Cologuard test every 3 years.  To learn more about your testing options, visit:   .  For help making a decision, visit:   https://bit.ly/sa41411.  Prostate cancer screening test: If you have a prostate, ask your care team if a prostate cancer screening test (PSA) at age 55 is right for you.  Lung cancer screening: If you are a current or former smoker ages 50 to 80, ask your care team if ongoing lung cancer screenings are right for you.  For informational purposes only. Not to replace the advice of your health care provider. Copyright   2023 [employer]. All rights reserved. Clinically reviewed by the [facility] Transitions Program. [employer] [id number] - REV 01/24.

## 2025-03-03 NOTE — ASSESSMENT & PLAN NOTE
DEXA scan done 11/15/2024 shows osteoporosis in the spine, right and left hip and femoral neck.  Today we discussed what osteoporosis is as well as  Both lifestyle interventions of eating a high-protein diet, taking supplements of vitamin D3 and K2 as well as calcium, resistance training and weight bearing exercises.  Medication includes Fosamax 70 mg once weekly which she is taking currently.

## 2025-03-03 NOTE — ASSESSMENT & PLAN NOTE
BP Readings from Last 3 Encounters:   03/03/25 120/80   02/18/25 (!) 162/89   02/06/25 131/84   Goal BP < 130/80 over 90/60  Medication: start Lisinopril 2.5 mg, just to keep BP under control and for DMT2    Familly can monitor blood pressure readings and if dropping to low, will need to contact me to adjust BP medication.

## 2025-03-03 NOTE — PROGRESS NOTES
Preventive Care Visit  [facility] STEPHEN Thayer, APRN CNP, Nurse Practitioner  Mar 3, 2025      Assessment & Plan   Problem List Items Addressed This Visit      Routine general medical examination at a health care facility    -  Primary       Essential hypertension     BP Readings from Last 3 Encounters:   03/03/25 120/80   02/18/25 (!) 162/89   02/06/25 131/84   Goal BP < 130/80 over 90/60  Medication: start Lisinopril 2.5 mg, just to keep BP under control and for DMT2    Familly can monitor blood pressure readings and if dropping to low, will need to contact me to adjust BP medication.          Relevant Medications    lisinopril (ZESTRIL) 2.5 MG tablet    Major depressive disorder, recurrent, moderate (H)     controlled  Medications:Seroquel 50 mg daily at bedtime.   Sertraline 150 mg daily.         Tobacco use disorder     Quit 5-6 months ago  Smoked cigarettes for 40 years  CT chest was done 10/2024 negative         Type 2 diabetes mellitus without complication, without long-term current use of insulin (H)     Lab Results   Component Value Date    A1C 6.5 10/03/2024     Medications: 2000 mg once daily increase the dose to to 2000 mg as her A1c has gone above 7.1.  Consider  adding Actos in the future if needed.   Nutrition: recommend a low carbohydrate whole foods high-protein diet  Exercise: Recommend some cardiorespiratory fitness activity and resistance training  Statin: rosuvastatin 10 mg  Aspirin: Will discuss next visit  ACE/ARB: restart Lisinopril 2. 5 mg to keep BP under 130/80 if dizzy can discontinue  Eye Exam: completed  Foot Exam: done 10/2024         Age-related osteoporosis without current pathological fracture     DEXA scan done 11/15/2024 shows osteoporosis in the spine, right and left hip and femoral neck.  Today we discussed what osteoporosis is as well as  Both lifestyle interventions of eating a high-protein diet, taking supplements of vitamin D3 and K2 as well as  "calcium, resistance training and weight bearing exercises.  Medication includes Fosamax 70 mg once weekly which she is taking currently.         Healthcare maintenance     Dexa scan completed 10/2024  Mammogram done 3/2023 reordered  Vaccines RSV, zoster  Colonoscopy done 2024             Other Visit Diagnoses           Type 2 diabetes mellitus with other circulatory complication, without long-term current use of insulin (H)        Relevant Orders    Hemoglobin A1c    Benign essential hypertension        Relevant Medications    lisinopril (ZESTRIL) 2.5 MG tablet    Other Relevant Orders    Basic metabolic panel  (Ca, Cl, CO2, Creat, Gluc, K, Na, BUN)    Visit for screening mammogram        Relevant Orders    MA Screen Bilateral w/Cameron          BMI  Estimated body mass index is 30.73 kg/m  as calculated from the following:    Height as of this encounter: 1.448 m (4' 9\").    Weight as of this encounter: 64.4 kg (142 lb).   Weight management plan: Discussed healthy diet and exercise guidelines    FUTURE APPOINTMENTS:       - Follow-up for annual visit or as needed      Namrata Camacho is a 64 year old, presenting for the following:  Physical, Knee Pain, and Headache           Healthy Habits:     In general, how would you rate your overall health?  Poor    Frequency of exercise:  4-5 days/week    Duration of exercise:  30-45 minutes    Do you usually eat at least 4 servings of fruit and vegetables a day, include whole grains    & fiber and avoid regularly eating high fat or \"junk\" foods?  Yes    Taking medications regularly:  Yes    Barriers to taking medications:  None    Medication side effects:  None    Ability to successfully perform activities of daily living:  Telephone requires assistance, transportation requires assistance, shopping requires assistance, preparing meals requires assistance, bathing requires assistance, laundry requires assistance, housework requires assistance and medication administration " requires assistance    Home Safety:  No safety concerns identified    Hearing Impairment:  Feel that people are mumbling or not speaking clearly, difficult to understand a speaker at a public meeting or [employer] service and need to ask people to speak up or repeat themselves    In the past 6 months, have you been bothered by leaking of urine?  No    In general, how would you rate your overall mental or emotional health?  Good    Additional concerns today:  Yes    Nutrition: rice, bread, meats, fruits and veggies, foods cooked at home  Exercise/movement:walking inside, not so much when it is cold. Outside more when it is nicer   Sleep; sleeping well, taking trazodone  Stress: no major stress  Alcohol: no  Tobacco: no quit 5-6 month ago, since childhood smoked 40 years  Drugs:    Family history  Updated   Advance Care Planning  Patient does not have a Health Care Directive: Discussed advance care planning with patient; however, patient declined at this time.        Today's PHQ-9 Score:       Social History     Tobacco Use    Smoking status: Former     Current packs/day: 0.00     Types: Cigarettes     Quit date:      Years since quittin.1   Vaping Use    Vaping status: Former       Mammogram Screening - Mammogram every 1-2 years updated in Health Maintenance based on mutual decision making      History of abnormal Pap smear: No - age 65 or older with pap indicated due to inadequate prior screening (3 consecutive negative cytology results, 2 consecutive negative cotesting results, or 2 consecutive negative HrHPV test results within 10 years, with the most recent test occurring within the recommended screening interval for the test used)       ASCVD Risk   The 10-year ASCVD risk score (Darryl AGUILAR, et al., 2019) is: 11.6%    Values used to calculate the score:      Age: 64 years      Sex: Female      Is Non- : No      Diabetic: Yes      Tobacco smoker: No      Systolic Blood  Pressure: 120 mmHg      Is BP treated: No      HDL Cholesterol: 34 mg/dL      Total Cholesterol: 177 mg/dL    Fracture Risk Assessment Tool  Link to Frax Calculator  Use the information below to complete the Frax calculator  : 1960  Sex: female  Weight (kg): 64.4 kg (actual weight)  Height (cm): 144.8 cm  Previous Fragility Fracture:  No  History of parent with fractured hip:  No  Current Smoking:  No  Patient has been on glucocorticoids for more than 3 months (5mg/day or more): No  Rheumatoid Arthritis on Problem List:  No  Secondary Osteoporosis on Problem List:  No  Consumes 3 or more units of alcohol per day: No  Femoral Neck BMD (g/cm2)    Reviewed and updated as needed this visit by Provider                    Past Medical History:   Diagnosis Date    Diabetes (H)     Tobacco use disorder 2023    Formatting of this note might be different from the original.   2 cig per day       Past Surgical History:   Procedure Laterality Date    COLONOSCOPY N/A 2025    Procedure: COLONOSCOPY, WITH POLYPECTOMY;  Surgeon: Steph Morrison MD;  Location: [facility] OR     Current providers sharing in care for this patient include:  Patient Care Team:  [facility] PCP - General (Nurse Practitioner)  [facility] as Assigned PCP  Gisele Bob MD as Assigned Musculoskeletal Provider  Марина Short RN as Lead Care Coordinator (Primary Care - CC)  Kirstin Rodriguez HUMBERTO as Community Health Worker  Aicha Davis MD as MD (Infectious Diseases)  Sam Borrero OD as MD (Optometrist)  Ganesh Lunsford MD as MD (Medical Oncology)  [facility] as Nurse Practitioner (Nurse Practitioner)  Gita Ndiaye RPH as Pharmacist (Pharmacist)  Aicha Davis MD as Assigned Infectious Disease Provider  Sam Borrero OD as Assigned Surgical Provider  Gita Ndiaye RPH as Assigned MTM Pharmacist  Friedell, Peter E, MD as MD  (Internal Medicine-Hematology & Oncology)  Avelina Dickens, RN as Specialty Care Coordinator (Hematology & Oncology)  Friedell, Peter E, MD as Assigned Cancer Care Provider    The following health maintenance items are reviewed in Epic and correct as of today:  Health Maintenance   Topic Date Due    ADVANCE CARE PLANNING  Never done    DEPRESSION ACTION PLAN  Never done    PHQ-9  Never done    RSV VACCINE (1 - Risk 60-74 years 1-dose series) Never done    ZOSTER IMMUNIZATION (2 of 2) 01/16/2020    A1C  01/03/2025    MAMMO SCREENING  03/21/2025    ANNUAL REVIEW OF HM ORDERS  03/22/2025    COVID-19 Vaccine (6 - 2024-25 season) 03/26/2025    LIPID  10/03/2025    FALL RISK ASSESSMENT  10/03/2025    LUNG CANCER SCREENING  10/13/2025    MICROALBUMIN  10/24/2025    DIABETIC FOOT EXAM  10/24/2025    EYE EXAM  12/13/2025    BMP  02/18/2026    MEDICARE ANNUAL WELLNESS VISIT  03/03/2026    DTAP/TDAP/TD IMMUNIZATION (2 - Td or Tdap) 11/22/2026    COLORECTAL CANCER SCREENING  01/29/2032    DEXA  11/15/2039    HEPATITIS C SCREENING  Completed    INFLUENZA VACCINE  Completed    Pneumococcal Vaccine: 50+ Years  Completed    HPV IMMUNIZATION  Aged Out    MENINGITIS IMMUNIZATION  Aged Out    PAP  Discontinued         Review of Systems  CONSTITUTIONAL: NEGATIVE for fever, chills, change in weight  INTEGUMENTARY/SKIN: NEGATIVE for worrisome rashes, moles or lesions  EYES: NEGATIVE for vision changes or irritation  ENT/MOUTH: NEGATIVE for ear, mouth and throat problems  RESP: NEGATIVE for significant cough or SOB  BREAST: NEGATIVE for masses, tenderness or discharge  CV: NEGATIVE for chest pain, palpitations or peripheral edema  GI: NEGATIVE for nausea, abdominal pain, heartburn, or change in bowel habits  : NEGATIVE for frequency, dysuria, or hematuria  MUSCULOSKELETAL: NEGATIVE for significant arthralgias or myalgia  NEURO: NEGATIVE for weakness, dizziness or paresthesias  ENDOCRINE: NEGATIVE for temperature intolerance, skin/hair  "changes  HEME: NEGATIVE for bleeding problems  PSYCHIATRIC: NEGATIVE for changes in mood or affect     Objective    Exam  /80 (BP Location: Right arm, Patient Position: Sitting, Cuff Size: Adult Regular)   Pulse 64   Temp (!) 96.7  F (35.9  C) (Tympanic)   Resp 16   Ht 1.448 m (4' 9\")   Wt 64.4 kg (142 lb)   SpO2 98%   BMI 30.73 kg/m     Estimated body mass index is 30.73 kg/m  as calculated from the following:    Height as of this encounter: 1.448 m (4' 9\").    Weight as of this encounter: 64.4 kg (142 lb).    Physical Exam  Constitutional:       Appearance: Normal appearance.   HENT:      Head: Normocephalic.      Right Ear: Tympanic membrane, ear canal and external ear normal.      Left Ear: Tympanic membrane, ear canal and external ear normal.      Nose: Nose normal.      Mouth/Throat:      Mouth: Mucous membranes are moist.      Pharynx: Oropharynx is clear.      Comments: Mallampati score Class 4  Eyes:      Extraocular Movements: Extraocular movements intact.      Conjunctiva/sclera: Conjunctivae normal.      Pupils: Pupils are equal, round, and reactive to light.   Neck:      Thyroid: No thyroid mass, thyromegaly or thyroid tenderness.      Trachea: Trachea normal.   Cardiovascular:      Rate and Rhythm: Normal rate and regular rhythm.      Heart sounds: Normal heart sounds.   Pulmonary:      Effort: Pulmonary effort is normal.      Breath sounds: Normal breath sounds.   Abdominal:      General: Abdomen is flat. Bowel sounds are normal.   Musculoskeletal:         General: Normal range of motion.      Cervical back: Normal range of motion.   Lymphadenopathy:      Cervical: No cervical adenopathy.   Skin:     General: Skin is warm and dry.   Neurological:      Mental Status: She is alert and oriented to person, place, and time.   Psychiatric:         Mood and Affect: Mood normal.         Behavior: Behavior normal.         Thought Content: Thought content normal.         Judgment: Judgment normal. "             3/3/2025   Mini Cog   Mini-Cog Not Completed (choose reason) Deaf/hard of hearing         Signed Electronically by: NEIDA Prince CNP

## 2025-03-03 NOTE — ASSESSMENT & PLAN NOTE
Dexa scan completed 10/2024  Mammogram done 3/2023 reordered  Vaccines RSV, zoster  Colonoscopy done 2024

## 2025-03-04 ENCOUNTER — PATIENT OUTREACH (OUTPATIENT)
Dept: GASTROENTEROLOGY | Facility: CLINIC | Age: 65
End: 2025-03-04
Payer: COMMERCIAL

## 2025-03-04 ENCOUNTER — PATIENT OUTREACH (OUTPATIENT)
Dept: CARE COORDINATION | Facility: CLINIC | Age: 65
End: 2025-03-04
Payer: COMMERCIAL

## 2025-03-04 RX ORDER — METFORMIN HYDROCHLORIDE 500 MG/1
2000 TABLET, EXTENDED RELEASE ORAL
Qty: 360 TABLET | Refills: 3 | Status: SHIPPED | OUTPATIENT
Start: 2025-03-04

## 2025-03-10 ENCOUNTER — PATIENT OUTREACH (OUTPATIENT)
Dept: CARE COORDINATION | Facility: CLINIC | Age: 65
End: 2025-03-10
Payer: COMMERCIAL

## 2025-03-10 NOTE — PROGRESS NOTES
Clinic Care Coordination Contact  Community Health Worker Follow Up    Care Gaps:     Health Maintenance Due   Topic Date Due    DEPRESSION ACTION PLAN  Never done    PHQ-9  Never done    RSV VACCINE (1 - Risk 60-74 years 1-dose series) Never done    ZOSTER IMMUNIZATION (2 of 2) 01/16/2020    MAMMO SCREENING  03/21/2025       Patient accepted scheduling phone number for [facility]  to schedule independently     Care Plan:   Care Plan: Annual Wellness Visit       Problem: HP GENERAL PROBLEM       Goal: I have accomplished completeing my Annual Wellness Visit  Completed 3/10/2025      This Visit's Progress: 100% Recent Progress: 50%    Note:     Personal Plan  I will schedule my next Annual Wellness visit for 3/3/26 by calling [phone] ([phone]).                             Care Plan: Appointments/Referrals       Problem: HP GENERAL PROBLEM       Goal: I would like to attend the following appointments/referrals.       Start Date: 10/3/2024    This Visit's Progress: 70% Recent Progress: 60%    Note:     Barriers: language barriermpleted  Strengths: family support  Patient expressed understanding of goal: yes  Action steps to achieve this goal:  1. I would like to call and schedule a dental exam.  My CHW is resending me the list of dental providers covered under [employer] Assistance today 3/10/25 by mail and [employer].  2. Eye appt-was referred to Retina Specialist for further eval has she attended this?-I let my CHW know that I have 2 eye appointments coming up soon and at this time I will see if one of them is a Retina Specialist.   3. Oncology appointment @ [phone] today for 2/6/25 @ 9:45. Oncology lab and doctor Follow up scheduled 3/20/25 appts starting at 10:15.  4.  Needs PCP follow up in March. Completed 3/3/25                                Intervention and Education during outreach: I resent patient dental providers covered under [employer] by mail and [employer].     Patient completed AWV on  3/3/25-Goal completed    Regarding appointments see goal for further details. No other needs at this time.     CHW Plan: CHW will follow up with patient in about 1 month.     Kirstin Rodriguez  Community Health Worker  [facility] Care Coordination   Thong Carter, [place], [place], [facility]  Office: [phone]

## 2025-03-10 NOTE — LETTER
[facility] CARE COORDINATION  [address] [facility] [address]    October 25, 2024    Doug Davidson  [address] [address]      Dear Doug,    I am a clinic community health worker who works with Suzanne Thayer with the [facility]. Below is information regarding Dental providers near you that accept Medical Assistance:    [facility]  Dental  Accepting new patients  [address]  [place] [address]  1[place] miles away  [phone]    [facility]  Dental  Accepting new patients  [address] [place] [address]  3.92 miles away  [phone]    [facility]  Dental  Accepting new patients  [address] [place] [address]  6.62 miles away  [phone]    [facility]  Specialty Clinic  Accepting new patients  [address] [place] [address]  7 miles away  [phone]    [facility]  Dental  Accepting new patients  [address]  [place] [address]  7.09 miles away  [phone]          Please feel free to contact me with any questions or concerns.           Sincerely,      Kirstin Rodriguez  Community Health Worker  [facility] Care Coordination   Thong Carter, [place], [place], [place] and [facility]  Office: [phone]

## 2025-03-18 ENCOUNTER — PATIENT OUTREACH (OUTPATIENT)
Dept: CARE COORDINATION | Facility: CLINIC | Age: 65
End: 2025-03-18
Payer: COMMERCIAL

## 2025-03-18 NOTE — PROGRESS NOTES
Clinic Care Coordination Contact  Care Coordination Clinician Chart Review    Situation: Patient chart reviewed by Care Coordinator.       Background: Care Coordination Program started: 9/26/2024. Initial assessment completed and patient-centered care plan(s) were developed with participation from patient. Lead CC handed patient off to CHW for continued outreaches.       Assessment: Per chart review, patient outreach completed by CC CHW on 3/10/25.  Patient is actively working to accomplish goal(s). Patient's goal(s) appropriate and relevant at this time. Patient is due for updated Plan of Care.  Assessments will be completed annually or as needed/with change of patient status.      Care Plan: Appointments/Referrals       Problem: HP GENERAL PROBLEM       Goal: I would like to attend the following appointments/referrals.       Start Date: 10/3/2024    This Visit's Progress: 70% Recent Progress: 60%    Note:     Barriers: language barriermpleted  Strengths: family support  Patient expressed understanding of goal: yes  Action steps to achieve this goal:  1. I would like to call and schedule a dental exam.  My CHW is resending me the list of dental providers covered under [facility] Medical Assistance today 3/10/25 by mail and [employer].  2. Eye appt-was referred to Retina Specialist for further eval has she attended this?-I let my CHW know that I have 2 eye appointments coming up soon and at this time I will see if one of them is a Retina Specialist.   3. Oncology appointment @ [phone] today for 2/6/25 @ 9:45. Oncology lab and doctor Follow up scheduled 3/20/25 appts starting at 10:15.  4.  Needs PCP follow up in March. Completed 3/3/25                                   Plan/Recommendations: The patient will continue working with Care Coordination to achieve goal(s) as above. CHW will continue outreaches at minimum every 30 days and will involve Lead CC as needed or if patient is ready to move to Maintenance. Lead CC  will continue to monitor CHW outreaches and patient's progress to goal(s) every 6 weeks.     Plan of Care updated and sent to patient: Yes, via [employer]

## 2025-03-18 NOTE — LETTER
[facility] CARE COORDINATION  [address]  [facility] [address]    March 18, 2025        Doug Davidson  [address] [address]          Dear Doug,     Attached is an updated Patient Centered Plan of Care for your continued enrollment in Care Coordination. Please let us know if you have additional questions, concerns, or goals that we can assist with.    Sincerely,    Марина Short RN  Clinic Care Coordination  [phone]      [facility]  Patient Centered Plan of Care  About Me:        Patient Name:  Doug Davidson    YOB: 1960  Age:         64 year old   Sarai MRN:    [id number] Telephone Information:  Home Phone [phone]   Mobile [phone]   Mobile [phone]       Address:  [address] [address] Email address:  [email or web address]      Emergency Contact(s)    Name Relationship Lgl Grd Work Phone Home Phone Mobile Phone   1. MARK HUSAIN Daughter-in-Law    [phone]   2. TEENA SOMMER Son    [phone]           Primary language:  [employer]     needed? Yes   [employer]:  [phone] op. 1  Other communication barriers:Language barrier    Preferred Method of Communication:     Current living arrangement: I live in a private home with family    Mobility Status/ Medical Equipment: Independent        Health Maintenance  Health Maintenance Reviewed: Due/Overdue      My Access Plan  Medical Emergency 911   Primary Clinic Line [facility] - [phone]   24 Hour Appointment Line [phone] or  [phone]) (toll-free)   24 Hour Nurse Line [phone] (toll-free)   Preferred Urgent Care [facility], [phone]     Preferred Hospital [facility]  [phone]     Preferred Pharmacy [facility] [place], [place] - [address]     Behavioral Health Crisis Line The National Suicide Prevention Lifeline at 1-549.869.6931 or  Text/Call 988           My Care Team Members  Patient Care Team         Relationship Specialty Notifications Start End    Anitra ThayerNEIDA Esposito CNP PCP - General Nurse Practitioner  9/24/24     Phone: [phone] Fax: [phone] [address] [address]    Anitra ThayerNEIDA Esposito CNP Assigned PCP   4/23/24     Phone: [phone] Fax: [phone] [address] [address]    Gisele Bob MD Assigned Musculoskeletal Provider   4/23/24     Phone: [phone]          [address] [facility] [address]    Марина Short, RN Lead Care Coordinator Primary Care - CC Admissions 10/1/24     Phone: [phone]         Kirstin Rodriguez W Community Health Worker  Admissions 10/3/24     Phone: [phone]         Aicha Davis MD MD Infectious Diseases  10/10/24     Phone: [phone] Fax: [phone]         [address] [facility] [address]    Sam Borrero OD MD Optometrist  10/18/24     Phone: [phone] Fax: [phone] [address] [facility] [address]    Ganesh Lunsford MD MD Medical Oncology  11/6/24     Phone: [phone] Fax: [phone]         [address] [facility] [address]    [facility], Suzanne Leah, APRN CNP Nurse Practitioner Nurse Practitioner  11/6/24     Phone: [phone] Fax: [phone]         [address] [facility] [address]    Gita Ndiaye [facility] Pharmacist Pharmacist  11/12/24     Phone: [phone] Fax: [phone]         [address] [facility] [address]    Aicha Davis MD Assigned Infectious Disease Provider   11/23/24     Phone: [phone] Fax: [phone]         [address] [facility] [address] [facility] [address]    Sam Borrero OD Assigned Surgical Provider   11/23/24     Phone: [phone] Fax: [phone] [address] [place] [address]    Gita Ndiaye, [facility] Assigned MTM Pharmacist   11/23/24     Phone: [phone] Fax: [phone]          [address] [facility] [address]    Friedell, Peter E, MD MD Internal Medicine-Hematology & Oncology  12/12/24     Phone: [phone] Fax: [phone] [address] [facility] [address]    Avelina Dickens, RN Specialty Care Coordinator Hematology & Oncology Admissions 2/10/25     Friedell, Peter E, MD Assigned Cancer Care Provider   2/23/25     Phone: [phone] Fax: [phone]         [address] [facility] [address]                My Care Plans  Self Management and Treatment Plan    Care Plan  Care Plan: Appointments/Referrals       Problem: HP GENERAL PROBLEM       Goal: I would like to attend the following appointments/referrals.       Start Date: 10/3/2024    This Visit's Progress: 70% Recent Progress: 60%    Note:     Barriers: language barriermpleted  Strengths: family support  Patient expressed understanding of goal: yes  Action steps to achieve this goal:  1. I would like to call and schedule a dental exam.  My CHW is resending me the list of dental providers covered under [facility] Medical Assistance today 3/10/25 by mail and [employer].  2. Eye appt-was referred to [facility]ina Specialist for further eval has she attended this?-I let my CHW know that I have 2 eye appointments coming up soon and at this time I will see if one of them is a Retina Specialist.   3. Oncology appointment @ [phone] today for 2/6/25 @ 9:45. Oncology lab and doctor Follow up scheduled 3/20/25 appts starting at 10:15.  4.  Needs PCP follow up in March. Completed 3/3/25                                Action Plans on File:                       Advance Care Plans/Directives:   Advanced Care Plan/Directives on file: No    Discussed with patient/caregiver(s): Declined Further Information             My Medical and Care Information  Problem List   Patient Active Problem List   Diagnosis    Essential hypertension    CRISTINO (generalized anxiety disorder)    Gastritis, chronic    Heme positive stool    Hyperlipidemia LDL  goal <100    History of Helicobacter pylori infection    Latent tuberculosis    Strongyloides stercoralis infection    Major depressive disorder, recurrent, moderate (H)    Tobacco use disorder    Pain in joint, ankle and foot, right    Type 2 diabetes mellitus without complication, without long-term current use of insulin (H)    Other insomnia    Dizziness    Hyponatremia    Syncope, unspecified syncope type    Age-related osteoporosis without current pathological fracture    Anemia, unspecified type    Iron deficiency anemia, unspecified iron deficiency anemia type    Adenomatous polyp of colon    Healthcare maintenance      Current Medications:  Please refer to the most recent medication list provided to you by your medical team and reach out to your provider with any questions or to make any corrections.    Care Coordination Start Date: 9/26/2024   Frequency of Care Coordination: monthly, more frequently as needed     Form Last Updated: 03/18/2025

## 2025-03-20 ENCOUNTER — ONCOLOGY VISIT (OUTPATIENT)
Dept: ONCOLOGY | Facility: HOSPITAL | Age: 65
End: 2025-03-20
Attending: INTERNAL MEDICINE
Payer: COMMERCIAL

## 2025-03-20 ENCOUNTER — LAB (OUTPATIENT)
Dept: INFUSION THERAPY | Facility: HOSPITAL | Age: 65
End: 2025-03-20
Attending: INTERNAL MEDICINE
Payer: COMMERCIAL

## 2025-03-20 VITALS
WEIGHT: 146.4 LBS | RESPIRATION RATE: 16 BRPM | TEMPERATURE: 98.2 F | SYSTOLIC BLOOD PRESSURE: 134 MMHG | HEART RATE: 72 BPM | OXYGEN SATURATION: 97 % | DIASTOLIC BLOOD PRESSURE: 87 MMHG | BODY MASS INDEX: 31.68 KG/M2

## 2025-03-20 DIAGNOSIS — D50.9 IRON DEFICIENCY ANEMIA, UNSPECIFIED IRON DEFICIENCY ANEMIA TYPE: Primary | ICD-10-CM

## 2025-03-20 DIAGNOSIS — D50.9 IRON DEFICIENCY ANEMIA, UNSPECIFIED IRON DEFICIENCY ANEMIA TYPE: ICD-10-CM

## 2025-03-20 LAB
BASOPHILS # BLD AUTO: 0.1 10E3/UL (ref 0–0.2)
BASOPHILS NFR BLD AUTO: 1 %
EOSINOPHIL # BLD AUTO: 0.1 10E3/UL (ref 0–0.7)
EOSINOPHIL NFR BLD AUTO: 2 %
ERYTHROCYTE [DISTWIDTH] IN BLOOD BY AUTOMATED COUNT: 12.3 % (ref 10–15)
FERRITIN SERPL-MCNC: 180 NG/ML (ref 11–328)
HCT VFR BLD AUTO: 35.5 % (ref 35–47)
HGB BLD-MCNC: 11.7 G/DL (ref 11.7–15.7)
IMM GRANULOCYTES # BLD: 0 10E3/UL
IMM GRANULOCYTES NFR BLD: 0 %
IRON BINDING CAPACITY (ROCHE): 274 UG/DL (ref 240–430)
IRON SATN MFR SERPL: 41 % (ref 15–46)
IRON SERPL-MCNC: 111 UG/DL (ref 37–145)
LYMPHOCYTES # BLD AUTO: 3.4 10E3/UL (ref 0.8–5.3)
LYMPHOCYTES NFR BLD AUTO: 47 %
MCH RBC QN AUTO: 29.4 PG (ref 26.5–33)
MCHC RBC AUTO-ENTMCNC: 33 G/DL (ref 31.5–36.5)
MCV RBC AUTO: 89 FL (ref 78–100)
MONOCYTES # BLD AUTO: 0.6 10E3/UL (ref 0–1.3)
MONOCYTES NFR BLD AUTO: 8 %
NEUTROPHILS # BLD AUTO: 3.1 10E3/UL (ref 1.6–8.3)
NEUTROPHILS NFR BLD AUTO: 43 %
NRBC # BLD AUTO: 0 10E3/UL
NRBC BLD AUTO-RTO: 0 /100
PLAT MORPH BLD: ABNORMAL
PLATELET # BLD AUTO: NORMAL 10*3/UL
RBC # BLD AUTO: 3.98 10E6/UL (ref 3.8–5.2)
RBC MORPH BLD: ABNORMAL
WBC # BLD AUTO: 7.2 10E3/UL (ref 4–11)

## 2025-03-20 PROCEDURE — G0463 HOSPITAL OUTPT CLINIC VISIT: HCPCS | Performed by: INTERNAL MEDICINE

## 2025-03-20 ASSESSMENT — PAIN SCALES - GENERAL: PAINLEVEL_OUTOF10: NO PAIN (0)

## 2025-03-20 NOTE — PROGRESS NOTES
"Oncology Rooming Note    March 20, 2025 10:56 AM   Doug Davidson is a 64 year old female who presents for:    Chief Complaint   Patient presents with    Oncology Clinic Visit     Return visit 6 weeks with lab. Iron deficiency anemia, unspecified iron deficiency anemia type.     Initial Vitals: /87 (BP Location: Right arm, Patient Position: Sitting, Cuff Size: Adult Regular)   Pulse 72   Temp 98.2  F (36.8  C) (Oral)   Resp 16   Wt 66.4 kg (146 lb 6.4 oz)   SpO2 97%   BMI 31.68 kg/m   Estimated body mass index is 31.68 kg/m  as calculated from the following:    Height as of 3/3/25: 1.448 m (4' 9\").    Weight as of this encounter: 66.4 kg (146 lb 6.4 oz). Body surface area is 1.63 meters squared.  No Pain (0) Comment: Data Unavailable   No LMP recorded. Patient is postmenopausal.  Allergies reviewed: Yes  Medications reviewed: Yes    Medications: Medication refills not needed today.  Pharmacy name entered into EPIC:    [facility] - [place], [place] - [address] [facility] [place], [place] - [facility] [place] - [address] [facility] [address]    Frailty Screening:   Is the patient here for a new oncology consult visit in cancer care? 2. No    PHQ9:  Did this patient require a PHQ9?: No      Clinical concerns: none     [employer] [facility]  Marcos [id number] via video used for this visit.       Nicki Shelby CMA            "

## 2025-03-20 NOTE — LETTER
"3/20/2025      Doug Davidson  [address], [place] [address]      Dear Colleague,    Thank you for referring your patient, oDug Davidson, to the [facility]. Please see a copy of my visit note below.    Oncology Rooming Note    March 20, 2025 10:56 AM   Doug Davidson is a 64 year old female who presents for:    Chief Complaint   Patient presents with     Oncology Clinic Visit     Return visit 6 weeks with lab. Iron deficiency anemia, unspecified iron deficiency anemia type.     Initial Vitals: /87 (BP Location: Right arm, Patient Position: Sitting, Cuff Size: Adult Regular)   Pulse 72   Temp 98.2  F (36.8  C) (Oral)   Resp 16   Wt 66.4 kg (146 lb 6.4 oz)   SpO2 97%   BMI 31.68 kg/m   Estimated body mass index is 31.68 kg/m  as calculated from the following:    Height as of 3/3/25: 1.448 m (4' 9\").    Weight as of this encounter: 66.4 kg (146 lb 6.4 oz). Body surface area is 1.63 meters squared.  No Pain (0) Comment: Data Unavailable   No LMP recorded. Patient is postmenopausal.  Allergies reviewed: Yes  Medications reviewed: Yes    Medications: Medication refills not needed today.  Pharmacy name entered into EPIC:    [facility] - [place], [place] - [address] [facility] [place], [place] - [address] [place] - [address] [place] [address] [phone]    Frailty Screening:   Is the patient here for a new oncology consult visit in cancer care? 2. No    PHQ9:  Did this patient require a PHQ9?: No      Clinical concerns: none     [employer] [place]  [place] [id number] via video used for this visit.       Nicki Shelby [facility] Hematology and Oncology Consult Note    Patient: Doug Davidson  MRN: [id number]  Date of Service: Mar 20, 2025       Reason for Visit    I was consulted by   NEIDA Butler CNP     For evaluation and management of iron " deficiency  The entire visit was assisted by a [facility]     Encounter Diagnoses Assessment and Plan:    Problem List Items Addressed This Visit       Iron deficiency anemia, unspecified iron deficiency anemia type - Primary     64-year-old woman whose comorbidities have included diabetes mellitus and gastritis.  Recent colonoscopy and Cologuard test have been negative.  Patient has been on ferrous sulfate 325 mg daily since October 2024..  She discontinued iron on 2/6/2025.  Today her iron saturation is normal and she is not anemic.  Ferritin is pending.  I have advised her to remain off iron continue on omeprazole.  She will follow-up with her primary care.  She will return to hematology if she again develops anemia.      ______________________________________________________________________________    History of Present Illness    Ms. Doug Davidson is a 64 year old woman with a history of anemia and elevated platelet count typical of iron deficiency.  She has been taking ferrous sulfate since October and apparently tolerating without difficulty.  Presently she feels well.  She is maintaining her weight.  Her appetite and digestion are normal.  She has not noted blood in the stool or black tarry stool.  She can go about her daily activities without difficulty.    Review of systems.  Pertinent Findings are included in the History of Present Illness    Physical Exam    /87 (BP Location: Right arm, Patient Position: Sitting, Cuff Size: Adult Regular)   Pulse 72   Temp 98.2  F (36.8  C) (Oral)   Resp 16   Wt 66.4 kg (146 lb 6.4 oz)   SpO2 97%   BMI 31.68 kg/m       GENERAL APPEARANCE: 64-year-old woman in no apparent distress.  HEAD: Atraumatic; normocephalic; without lesions.  EYES: Conjunctiva, corneas and eyelids normal; pupils equal, round, reactive to light; No Icterus.  MOUTH/OROPHARYNX: Oral mucosa intact  NECK: Supple with no nodes.  LUNGS:  Clear to auscultation and percussion with no  extra sounds.  HEART: Regular rhythm and rate; S1 and S2 normal; no murmurs noted.  ABDOMEN: Soft; no masses or tenderness, no hepatosplenomegaly.  NEUROLOGIC: Alert and oriented.  No obvious focal findings.  EXTREMITIES: No cyanosis, or edema.  SKIN: No abnormal bruising or bleeding. No suspicious lesions noted on exposed skin.  PSYCHIATRIC: Mental status normal; no apparent psychiatric issues    Medications:    Current Outpatient Medications   Medication Sig Dispense Refill     alendronate (FOSAMAX) 70 MG tablet Take 1 tablet (70 mg) by mouth every 7 days. 12 tablet 3     ammonium lactate (AMLACTIN) 12 % external cream Apply liberally to dry skin daily.       bisacodyl (DULCOLAX) 5 MG EC tablet Two days prior to exam take two (2) tablets at 4pm. One day prior to exam take two (2) tablets at 4pm 4 tablet 0     Cholecalciferol (VITAMIN D) 50 MCG (2000 UT) CAPS Take 1 tablet by mouth daily.       fluticasone (FLONASE) 50 MCG/ACT nasal spray Spray 1 spray into both nostrils daily. 16 g 11     gabapentin (NEURONTIN) 100 MG capsule Take 1 capsule (100 mg) by mouth at bedtime. 90 capsule 3     hydrocortisone (CORTAID) 1 % external cream Apply topically 2 times daily as needed for rash or itching. 60 g 2     lisinopril (ZESTRIL) 2.5 MG tablet Take 1 tablet (2.5 mg) by mouth daily. 90 tablet 1     meclizine (ANTIVERT) 12.5 MG tablet Take 1 tablet (12.5 mg) by mouth 4 times daily as needed for dizziness. 30 tablet 0     meclizine (ANTIVERT) 25 MG tablet Take 1 tablet (25 mg) by mouth 3 times daily as needed for dizziness. 30 tablet 1     metFORMIN (GLUCOPHAGE XR) 500 MG 24 hr tablet Take 4 tablets (2,000 mg) by mouth daily (with dinner). 360 tablet 3     metFORMIN (GLUCOPHAGE XR) 500 MG 24 hr tablet Take 2 tablets (1,000 mg) by mouth daily (with dinner). 180 tablet 0     olopatadine (PATANOL) 0.1 % ophthalmic solution Place 1 drop Into the left eye 2 times daily. 5 mL 0     omeprazole (PRILOSEC) 20 MG DR capsule Take 1  capsule (20 mg) by mouth daily. 90 capsule 3     QUEtiapine (SEROQUEL) 50 MG tablet Take 1 tablet (50 mg) by mouth at bedtime. 90 tablet 3     rosuvastatin (CRESTOR) 10 MG tablet Take 1 tablet (10 mg) by mouth daily. 90 tablet 3     SENEXON-S 8.6-50 MG tablet Take 1 tablet by mouth 2 times daily       sertraline (ZOLOFT) 100 MG tablet Take 1 tablet (100 mg) by mouth daily. 90 tablet 3     sertraline (ZOLOFT) 50 MG tablet Take 1 tablet (50 mg) by mouth daily. 90 tablet 3     traZODone (DESYREL) 50 MG tablet Take 1 tablet (50 mg) by mouth at bedtime. 30 tablet 0     polyethylene glycol (GOLYTELY) 236 g suspension Two days before procedure at 5PM fill first container with water. Mix and drink an 8 oz glass every 15 minutes until HALF of the container is gone. Place the remainder in the refrigerator. One day before procedure at 5PM drink second half of bowel prep. Drink an 8 oz glass every 15 minutes until it is gone. Day of procedure 6 hours before arrival time fill the 2nd container with water. Mix and drink an 8 oz glass every 15 minutes until HALF of the container is gone. Discard the remaining solution. (Patient not taking: Reported on 3/20/2025) 8000 mL 0     valACYclovir (VALTREX) 1000 mg tablet Take 2 tablets (2,000 mg) by mouth 2 times daily for 1 day. 4 tablet 0     No current facility-administered medications for this visit.           Past History    Past Medical History:   Diagnosis Date     Diabetes (H)      Tobacco use disorder 02/01/2023    Formatting of this note might be different from the original.   2 cig per day       Past Surgical History:   Procedure Laterality Date     COLONOSCOPY N/A 1/29/2025    Procedure: COLONOSCOPY, WITH POLYPECTOMY;  Surgeon: Steph Morrison MD;  Location: [facility] OR     No Known Allergies  Family History   Problem Relation Age of Onset     Diabetes Type 2  Sister      No Known Problems Son      No Known Problems Son      No Known Problems Son      No Known Problems Son       No Known Problems Daughter      No Known Problems Daughter      Colon Cancer No family hx of      Breast Cancer No family hx of      Social History     Socioeconomic History     Marital status:    Tobacco Use     Smoking status: Former     Current packs/day: 0.00     Types: Cigarettes     Quit date:      Years since quittin.1   Vaping Use     Vaping status: Former     Social Drivers of Health     Financial Resource Strain: Low Risk  (10/13/2024)    Financial Resource Strain      Within the past 12 months, have you or your family members you live with been unable to get utilities (heat, electricity) when it was really needed?: No   Food Insecurity: Low Risk  (10/13/2024)    Food Insecurity      Within the past 12 months, did you worry that your food would run out before you got money to buy more?: No      Within the past 12 months, did the food you bought just not last and you didn t have money to get more?: No   Transportation Needs: Low Risk  (10/13/2024)    Transportation Needs      Within the past 12 months, has lack of transportation kept you from medical appointments, getting your medicines, non-medical meetings or appointments, work, or from getting things that you need?: No   Interpersonal Safety: Low Risk  (2025)    Interpersonal Safety      Do you feel physically and emotionally safe where you currently live?: Yes      Within the past 12 months, have you been hit, slapped, kicked or otherwise physically hurt by someone?: No      Within the past 12 months, have you been humiliated or emotionally abused in other ways by your partner or ex-partner?: No   Housing Stability: High Risk (10/13/2024)    Housing Stability      Do you have housing? : No      Are you worried about losing your housing?: No           Lab Results    Recent Results (from the past 240 hours)   Iron & Iron Binding Capacity    Collection Time: 25 10:32 AM   Result Value Ref Range    Iron 111 37 - 145 ug/dL    Iron  Binding Capacity 274 240 - 430 ug/dL    Iron Sat Index 41 15 - 46 %   CBC with platelets and differential    Collection Time: 03/20/25 10:32 AM   Result Value Ref Range    WBC Count 7.2 4.0 - 11.0 10e3/uL    RBC Count 3.98 3.80 - 5.20 10e6/uL    Hemoglobin 11.7 11.7 - 15.7 g/dL    Hematocrit 35.5 35.0 - 47.0 %    MCV 89 78 - 100 fL    MCH 29.4 26.5 - 33.0 pg    MCHC 33.0 31.5 - 36.5 g/dL    RDW 12.3 10.0 - 15.0 %    Platelet Count      % Neutrophils 43 %    % Lymphocytes 47 %    % Monocytes 8 %    % Eosinophils 2 %    % Basophils 1 %    % Immature Granulocytes 0 %    NRBCs per 100 WBC 0 <1 /100    Absolute Neutrophils 3.1 1.6 - 8.3 10e3/uL    Absolute Lymphocytes 3.4 0.8 - 5.3 10e3/uL    Absolute Monocytes 0.6 0.0 - 1.3 10e3/uL    Absolute Eosinophils 0.1 0.0 - 0.7 10e3/uL    Absolute Basophils 0.1 0.0 - 0.2 10e3/uL    Absolute Immature Granulocytes 0.0 <=0.4 10e3/uL    Absolute NRBCs 0.0 10e3/uL   RBC and Platelet Morphology    Collection Time: 03/20/25 10:32 AM   Result Value Ref Range    RBC Morphology Confirmed RBC Indices     Platelet Assessment Platelets Clumped (A) Automated Count Confirmed. Platelet morphology is normal.       Imaging Results    MR Brain w/o Contrast    Result Date: 2/18/2025  MR BRAIN W/O CONTRAST [facility] 2/18/2025 3:15 PM CST INDICATION: VERTIGO; Headache; Acute HA (< 3 months), no complicating features TECHNIQUE: Noncontrast MRI of the brain. CONTRAST: None COMPARISON: Head and neck CTA 10/13/2024, brain MRI 5/1/2022 FINDINGS: There is no restricted diffusion. Paranasal sinuses are free from significant disease. Mastoid air cells appear free from significant disease. Intraorbital contents are unremarkable. Ventricles are within normal limits in size for the patient's  age. Intracranial flow voids are intact. There is no mass effect, midline shift, or extraaxial collection. There are a few foci of T2/FLAIR hyperintensity within the cerebral white matter that are  nonspecific but most commonly reflect the sequela of chronic small vessel ischemic disease. No evidence for acute or chronic intracranial blood products.     IMPRESSION: 1.  No acute intracranial finding. No evidence for recent ischemia, intracranial hemorrhage, or mass. 2.  A few T2 hyperintense foci in the white matter are nonspecific but most commonly reflect gliosis related to prior ischemic or inflammatory insult. They have been reported in the setting of migraines.        I spent 30 minutes on the patient's visit today.  This included preparation for the visit, face-to-face time with the patient and documentation following the visit.  It did not include teaching or procedure time.    Signed by: Peter E. Friedell, MD          Again, thank you for allowing me to participate in the care of your patient.        Sincerely,        Peter E. Friedell, MD    Electronically signed

## 2025-03-20 NOTE — PROGRESS NOTES
[facility] Consult Note    Patient: Doug Davidson  MRN: [id number]  Date of Service: Mar 20, 2025       Reason for Visit    I was consulted by   NEIDA Butler CNP     For evaluation and management of iron deficiency  The entire visit was assisted by a [facility]     Encounter Diagnoses Assessment and Plan:    Problem List Items Addressed This Visit       Iron deficiency anemia, unspecified iron deficiency anemia type - Primary     64-year-old woman whose comorbidities have included diabetes mellitus and gastritis.  Recent colonoscopy and Cologuard test have been negative.  Patient has been on ferrous sulfate 325 mg daily since October 2024..  She discontinued iron on 2/6/2025.  Today her iron saturation is normal and she is not anemic.  Ferritin is pending.  I have advised her to remain off iron continue on omeprazole.  She will follow-up with her primary care.  She will return to hematology if she again develops anemia.      ______________________________________________________________________________    History of Present Illness    Ms. Doug Davidson is a 64 year old woman with a history of anemia and elevated platelet count typical of iron deficiency.  She has been taking ferrous sulfate since October and apparently tolerating without difficulty.  Presently she feels well.  She is maintaining her weight.  Her appetite and digestion are normal.  She has not noted blood in the stool or black tarry stool.  She can go about her daily activities without difficulty.    Review of systems.  Pertinent Findings are included in the History of Present Illness    Physical Exam    /87 (BP Location: Right arm, Patient Position: Sitting, Cuff Size: Adult Regular)   Pulse 72   Temp 98.2  F (36.8  C) (Oral)   Resp 16   Wt 66.4 kg (146 lb 6.4 oz)   SpO2 97%   BMI 31.68 kg/m       GENERAL APPEARANCE: 64-year-old woman in no apparent distress.  HEAD: Atraumatic;  normocephalic; without lesions.  EYES: Conjunctiva, corneas and eyelids normal; pupils equal, round, reactive to light; No Icterus.  MOUTH/OROPHARYNX: Oral mucosa intact  NECK: Supple with no nodes.  LUNGS:  Clear to auscultation and percussion with no extra sounds.  HEART: Regular rhythm and rate; S1 and S2 normal; no murmurs noted.  ABDOMEN: Soft; no masses or tenderness, no hepatosplenomegaly.  NEUROLOGIC: Alert and oriented.  No obvious focal findings.  EXTREMITIES: No cyanosis, or edema.  SKIN: No abnormal bruising or bleeding. No suspicious lesions noted on exposed skin.  PSYCHIATRIC: Mental status normal; no apparent psychiatric issues    Medications:    Current Outpatient Medications   Medication Sig Dispense Refill    alendronate (FOSAMAX) 70 MG tablet Take 1 tablet (70 mg) by mouth every 7 days. 12 tablet 3    ammonium lactate (AMLACTIN) 12 % external cream Apply liberally to dry skin daily.      bisacodyl (DULCOLAX) 5 MG EC tablet Two days prior to exam take two (2) tablets at 4pm. One day prior to exam take two (2) tablets at 4pm 4 tablet 0    Cholecalciferol (VITAMIN D) 50 MCG (2000 UT) CAPS Take 1 tablet by mouth daily.      fluticasone (FLONASE) 50 MCG/ACT nasal spray Spray 1 spray into both nostrils daily. 16 g 11    gabapentin (NEURONTIN) 100 MG capsule Take 1 capsule (100 mg) by mouth at bedtime. 90 capsule 3    hydrocortisone (CORTAID) 1 % external cream Apply topically 2 times daily as needed for rash or itching. 60 g 2    lisinopril (ZESTRIL) 2.5 MG tablet Take 1 tablet (2.5 mg) by mouth daily. 90 tablet 1    meclizine (ANTIVERT) 12.5 MG tablet Take 1 tablet (12.5 mg) by mouth 4 times daily as needed for dizziness. 30 tablet 0    meclizine (ANTIVERT) 25 MG tablet Take 1 tablet (25 mg) by mouth 3 times daily as needed for dizziness. 30 tablet 1    metFORMIN (GLUCOPHAGE XR) 500 MG 24 hr tablet Take 4 tablets (2,000 mg) by mouth daily (with dinner). 360 tablet 3    metFORMIN (GLUCOPHAGE XR) 500 MG  24 hr tablet Take 2 tablets (1,000 mg) by mouth daily (with dinner). 180 tablet 0    olopatadine (PATANOL) 0.1 % ophthalmic solution Place 1 drop Into the left eye 2 times daily. 5 mL 0    omeprazole (PRILOSEC) 20 MG DR capsule Take 1 capsule (20 mg) by mouth daily. 90 capsule 3    QUEtiapine (SEROQUEL) 50 MG tablet Take 1 tablet (50 mg) by mouth at bedtime. 90 tablet 3    rosuvastatin (CRESTOR) 10 MG tablet Take 1 tablet (10 mg) by mouth daily. 90 tablet 3    SENEXON-S 8.6-50 MG tablet Take 1 tablet by mouth 2 times daily      sertraline (ZOLOFT) 100 MG tablet Take 1 tablet (100 mg) by mouth daily. 90 tablet 3    sertraline (ZOLOFT) 50 MG tablet Take 1 tablet (50 mg) by mouth daily. 90 tablet 3    traZODone (DESYREL) 50 MG tablet Take 1 tablet (50 mg) by mouth at bedtime. 30 tablet 0    polyethylene glycol (GOLYTELY) 236 g suspension Two days before procedure at 5PM fill first container with water. Mix and drink an 8 oz glass every 15 minutes until HALF of the container is gone. Place the remainder in the refrigerator. One day before procedure at 5PM drink second half of bowel prep. Drink an 8 oz glass every 15 minutes until it is gone. Day of procedure 6 hours before arrival time fill the 2nd container with water. Mix and drink an 8 oz glass every 15 minutes until HALF of the container is gone. Discard the remaining solution. (Patient not taking: Reported on 3/20/2025) 8000 mL 0    valACYclovir (VALTREX) 1000 mg tablet Take 2 tablets (2,000 mg) by mouth 2 times daily for 1 day. 4 tablet 0     No current facility-administered medications for this visit.           Past History    Past Medical History:   Diagnosis Date    Diabetes (H)     Tobacco use disorder 02/01/2023    Formatting of this note might be different from the original.   2 cig per day       Past Surgical History:   Procedure Laterality Date    COLONOSCOPY N/A 1/29/2025    Procedure: COLONOSCOPY, WITH POLYPECTOMY;  Surgeon: Steph Morrison MD;   Location: [facility] OR     No Known Allergies  Family History   Problem Relation Age of Onset    Diabetes Type 2  Sister     No Known Problems Son     No Known Problems Son     No Known Problems Son     No Known Problems Son     No Known Problems Daughter     No Known Problems Daughter     Colon Cancer No family hx of     Breast Cancer No family hx of      Social History     Socioeconomic History    Marital status:    Tobacco Use    Smoking status: Former     Current packs/day: 0.00     Types: Cigarettes     Quit date:      Years since quittin.1   Vaping Use    Vaping status: Former     Social Drivers of Health     Financial Resource Strain: Low Risk  (10/13/2024)    Financial Resource Strain     Within the past 12 months, have you or your family members you live with been unable to get utilities (heat, electricity) when it was really needed?: No   Food Insecurity: Low Risk  (10/13/2024)    Food Insecurity     Within the past 12 months, did you worry that your food would run out before you got money to buy more?: No     Within the past 12 months, did the food you bought just not last and you didn t have money to get more?: No   Transportation Needs: Low Risk  (10/13/2024)    Transportation Needs     Within the past 12 months, has lack of transportation kept you from medical appointments, getting your medicines, non-medical meetings or appointments, work, or from getting things that you need?: No   Interpersonal Safety: Low Risk  (2025)    Interpersonal Safety     Do you feel physically and emotionally safe where you currently live?: Yes     Within the past 12 months, have you been hit, slapped, kicked or otherwise physically hurt by someone?: No     Within the past 12 months, have you been humiliated or emotionally abused in other ways by your partner or ex-partner?: No   Housing Stability: High Risk (10/13/2024)    Housing Stability     Do you have housing? : No     Are you worried about losing your  housing?: No           Lab Results    Recent Results (from the past 240 hours)   Iron & Iron Binding Capacity    Collection Time: 03/20/25 10:32 AM   Result Value Ref Range    Iron 111 37 - 145 ug/dL    Iron Binding Capacity 274 240 - 430 ug/dL    Iron Sat Index 41 15 - 46 %   CBC with platelets and differential    Collection Time: 03/20/25 10:32 AM   Result Value Ref Range    WBC Count 7.2 4.0 - 11.0 10e3/uL    RBC Count 3.98 3.80 - 5.20 10e6/uL    Hemoglobin 11.7 11.7 - 15.7 g/dL    Hematocrit 35.5 35.0 - 47.0 %    MCV 89 78 - 100 fL    MCH 29.4 26.5 - 33.0 pg    MCHC 33.0 31.5 - 36.5 g/dL    RDW 12.3 10.0 - 15.0 %    Platelet Count      % Neutrophils 43 %    % Lymphocytes 47 %    % Monocytes 8 %    % Eosinophils 2 %    % Basophils 1 %    % Immature Granulocytes 0 %    NRBCs per 100 WBC 0 <1 /100    Absolute Neutrophils 3.1 1.6 - 8.3 10e3/uL    Absolute Lymphocytes 3.4 0.8 - 5.3 10e3/uL    Absolute Monocytes 0.6 0.0 - 1.3 10e3/uL    Absolute Eosinophils 0.1 0.0 - 0.7 10e3/uL    Absolute Basophils 0.1 0.0 - 0.2 10e3/uL    Absolute Immature Granulocytes 0.0 <=0.4 10e3/uL    Absolute NRBCs 0.0 10e3/uL   RBC and Platelet Morphology    Collection Time: 03/20/25 10:32 AM   Result Value Ref Range    RBC Morphology Confirmed RBC Indices     Platelet Assessment Platelets Clumped (A) Automated Count Confirmed. Platelet morphology is normal.       Imaging Results    MR Brain w/o Contrast    Result Date: 2/18/2025  MR BRAIN W/O CONTRAST [facility] 2/18/2025 3:15 PM CST INDICATION: VERTIGO; Headache; Acute HA (< 3 months), no complicating features TECHNIQUE: Noncontrast MRI of the brain. CONTRAST: None COMPARISON: Head and neck CTA 10/13/2024, brain MRI 5/1/2022 FINDINGS: There is no restricted diffusion. Paranasal sinuses are free from significant disease. Mastoid air cells appear free from significant disease. Intraorbital contents are unremarkable. Ventricles are within normal limits in size for the  patient's  age. Intracranial flow voids are intact. There is no mass effect, midline shift, or extraaxial collection. There are a few foci of T2/FLAIR hyperintensity within the cerebral white matter that are nonspecific but most commonly reflect the sequela of chronic small vessel ischemic disease. No evidence for acute or chronic intracranial blood products.     IMPRESSION: 1.  No acute intracranial finding. No evidence for recent ischemia, intracranial hemorrhage, or mass. 2.  A few T2 hyperintense foci in the white matter are nonspecific but most commonly reflect gliosis related to prior ischemic or inflammatory insult. They have been reported in the setting of migraines.        I spent 30 minutes on the patient's visit today.  This included preparation for the visit, face-to-face time with the patient and documentation following the visit.  It did not include teaching or procedure time.    Signed by: Peter E. Friedell, MD

## 2025-04-03 ENCOUNTER — MYC REFILL (OUTPATIENT)
Dept: FAMILY MEDICINE | Facility: CLINIC | Age: 65
End: 2025-04-03
Payer: COMMERCIAL

## 2025-04-03 DIAGNOSIS — J30.2 SEASONAL ALLERGIC RHINITIS, UNSPECIFIED TRIGGER: ICD-10-CM

## 2025-04-03 DIAGNOSIS — E55.9 VITAMIN D DEFICIENCY: Primary | ICD-10-CM

## 2025-04-03 DIAGNOSIS — L85.3 DRY SKIN: ICD-10-CM

## 2025-04-03 DIAGNOSIS — H57.9 ITCH OF EYE, LEFT: ICD-10-CM

## 2025-04-03 DIAGNOSIS — K29.50 CHRONIC GASTRITIS WITHOUT BLEEDING, UNSPECIFIED GASTRITIS TYPE: ICD-10-CM

## 2025-04-03 DIAGNOSIS — R42 DIZZINESS: ICD-10-CM

## 2025-04-04 NOTE — TELEPHONE ENCOUNTER
Requested Prescriptions   Pending Prescriptions Disp Refills    Cholecalciferol (VITAMIN D) 50 MCG (2000 UT) CAPS       Sig: Take 1 tablet by mouth daily.       Vitamin Supplements Protocol Failed - 4/4/2025  9:16 AM        Failed - Medication indicated for associated diagnosis     The medication is prescribed for one or more of the following conditions:     Vitamin deficiency   Osteopenia   Osteoporosis   Nutrition counseling   Nutrition education   Feeding ability finding   Bone density finding   Morbid Obesity   History of bypass of stomach   Idiopathic peripheral neuropathy   General examination of patient   Vitamin D deficiency   Folate deficiency anemia due to dietary causes   Sleeve resection of stomach   Rheumatoid factor level - finding   Crohn's disease   Psoriatic arthritis   Transplant of Kidney   Arthropathy   Alcoholism   Malabsorption syndrome   Disorder of bone and articular cartilage   Cystic Fibrosis  Bronchiectasis            Passed - The patient does not have an order for high-dose vitamin D        Passed - Medication is active on med list and the sig matches. RN to manually verify dose and sig if red X/fail.     If the protocol passes (green check), you do not need to verify med dose and sig.    A prescription matches if they are the same clinical intention.    For Example: once daily and every morning are the same.    The protocol can not identify upper and lower case letters as matching and will fail.     For Example: Take 1 tablet (50 mg) by mouth daily     TAKE 1 TABLET (50 MG) BY MOUTH DAILY    For all fails (red x), verify dose and sig.    If the refill does match what is on file, the RN can still proceed to approve the refill request.       If they do not match, route to the appropriate provider.             Passed - The patient is 2 years of age or older        Passed - Recent (12 mo) or future (90 days) visit within the authorizing provider's specialty     The patient must have  completed an in-person or virtual visit within the past 12 months or has a future visit scheduled within the next 90 days with the authorizing provider s specialty.  Urgent care and e-visits do not qualify as an office visit for this protocol.            ammonium lactate (AMLACTIN) 12 % external cream         Miscellaneous Dermatologic Agents Failed - 4/4/2025  9:16 AM        Failed - Medication is active on med list and the sig matches. RN to manually verify dose and sig if red X/fail.     If the protocol passes (green check), you do not need to verify med dose and sig.    A prescription matches if they are the same clinical intention.    For Example: once daily and every morning are the same.    The protocol can not identify upper and lower case letters as matching and will fail.     For Example: Take 1 tablet (50 mg) by mouth daily     TAKE 1 TABLET (50 MG) BY MOUTH DAILY    For all fails (red x), verify dose and sig.    If the refill does match what is on file, the RN can still proceed to approve the refill request.       If they do not match, route to the appropriate provider.             Failed - Medication indicated for associated diagnosis     Medication is associated with one or more of the following diagnoses:    Ichthyosis of skin   Dermatitis   Benign prostatic hyperplasia   Male pattern alopecia   Hirsutism   Alopecia   Eczema   Keratosis   Psoriasis   Xeroderma   Hyperhidrosis   Sialorrhea (drooling)          Passed - Refill request is not for Imiquimod, 5-Fluorouracil, or Finasteride      If Imiquimod, 5-Fluorouracil, or Finasteride, may refill if indicated in progress notes.           Passed - Recent (12 mo) or future (90 days) visit within the authorizing provider's specialty     The patient must have completed an in-person or virtual visit within the past 12 months or has a future visit scheduled within the next 90 days with the authorizing provider s specialty.  Urgent care and e-visits do not  qualify as an office visit for this protocol.          Passed - Patient is 24 mos old or older        Passed - No active pregnancy on record        Passed - No positive pregnancy test in past 12 months          fluticasone (FLONASE) 50 MCG/ACT nasal spray 16 g 11     Sig: Spray 1 spray into both nostrils daily.       Nasal Allergy Protocol Passed - 4/4/2025  9:16 AM        Passed - Patient is age 12 or older        Passed - Medication is active on med list and the sig matches. RN to manually verify dose and sig if red X/fail.     If the protocol passes (green check), you do not need to verify med dose and sig.    A prescription matches if they are the same clinical intention.    For Example: once daily and every morning are the same.    The protocol can not identify upper and lower case letters as matching and will fail.     For Example: Take 1 tablet (50 mg) by mouth daily     TAKE 1 TABLET (50 MG) BY MOUTH DAILY    For all fails (red x), verify dose and sig.    If the refill does match what is on file, the RN can still proceed to approve the refill request.       If they do not match, route to the appropriate provider.             Passed - Recent (12 mo) or future (90 days) visit within the authorizing provider's specialty     The patient must have completed an in-person or virtual visit within the past 12 months or has a future visit scheduled within the next 90 days with the authorizing provider s specialty.  Urgent care and e-visits do not qualify as an office visit for this protocol.          Passed - Medication indicated for associated diagnosis     Medication is associated with one or more of the following diagnoses:   Allergic conjunctivitis  Allergies  Nasal congestion  Nasal discharge  Rhinitis  Sinus congestion  Recurrent acute maxillary sinusitis  Environmental allergies   Acute sinusitis   Cystic Fibrosis  Bronchiectasis            olopatadine (PATANOL) 0.1 % ophthalmic solution 5 mL 0     Sig: Place 1  drop Into the left eye 2 times daily.       Miscellaneous Opthalmic Allergy Drops Protocol Passed - 4/4/2025  9:16 AM        Passed - Patient is age 4 or older        Passed - Medication is active on med list and the sig matches. RN to manually verify dose and sig if red X/fail.     If the protocol passes (green check), you do not need to verify med dose and sig.    A prescription matches if they are the same clinical intention.    For Example: once daily and every morning are the same.    The protocol can not identify upper and lower case letters as matching and will fail.     For Example: Take 1 tablet (50 mg) by mouth daily     TAKE 1 TABLET (50 MG) BY MOUTH DAILY    For all fails (red x), verify dose and sig.    If the refill does match what is on file, the RN can still proceed to approve the refill request.       If they do not match, route to the appropriate provider.             Passed - Recent (12 mo) or future (90 days) visit within the authorizing provider's specialty     The patient must have completed an in-person or virtual visit within the past 12 months or has a future visit scheduled within the next 90 days with the authorizing provider s specialty.  Urgent care and e-visits do not qualify as an office visit for this protocol.          Passed - Patient is not pregnant        Passed - No positive pregnancy test on record in past 12 mos          omeprazole (PRILOSEC) 20 MG DR capsule 90 capsule 3     Sig: Take 1 capsule (20 mg) by mouth daily.       PPI Protocol Passed - 4/4/2025  9:16 AM        Passed - Medication is active on med list and the sig matches. RN to manually verify dose and sig if red X/fail.     If the protocol passes (green check), you do not need to verify med dose and sig.    A prescription matches if they are the same clinical intention.    For Example: once daily and every morning are the same.    The protocol can not identify upper and lower case letters as matching and will fail.      For Example: Take 1 tablet (50 mg) by mouth daily     TAKE 1 TABLET (50 MG) BY MOUTH DAILY    For all fails (red x), verify dose and sig.    If the refill does match what is on file, the RN can still proceed to approve the refill request.       If they do not match, route to the appropriate provider.             Passed - Medication indicated for associated diagnosis     The medication is prescribed for one or more of the following conditions:     Erosive esophagitis    Gastritis   Gastric hypersecretion   Gastric ulcer   Gastroesophageal reflux disease   Helicobacter pylori gastrointestinal tract infection   Ulcer of duodenum   Drug-induced peptic ulcer   Esophageal stricture   Gastrointestinal hemorrhage   Indigestion   Stress ulcer   Zollinger-Reilly syndrome   Ventura s esophagus   Laryngopharyngeal reflux   Epigastric Pain   Morbid Obesity   Cough   History of Peptic Ulcer   Esophageal Atresia, Stenosis and Fistula   Cystic Fibrosis  Bronchiectasis          Passed - Recent (12 mo) or future (90 days) visit within the authorizing provider's specialty     The patient must have completed an in-person or virtual visit within the past 12 months or has a future visit scheduled within the next 90 days with the authorizing provider s specialty.  Urgent care and e-visits do not qualify as an office visit for this protocol.          Passed - Patient is age 18 or older        Passed - No active pregnacy on record        Passed - No positive pregnancy test in past 12 months          meclizine (ANTIVERT) 25 MG tablet 30 tablet 1     Sig: Take 1 tablet (25 mg) by mouth 3 times daily as needed for dizziness.        Antivertigo/Antiemetic Agents Passed - 4/4/2025  9:16 AM        Passed - Medication is active on med list and the sig matches. RN to manually verify dose and sig if red X/fail.     If the protocol passes (green check), you do not need to verify med dose and sig.    A prescription matches if they are the same  clinical intention.    For Example: once daily and every morning are the same.    The protocol can not identify upper and lower case letters as matching and will fail.     For Example: Take 1 tablet (50 mg) by mouth daily     TAKE 1 TABLET (50 MG) BY MOUTH DAILY    For all fails (red x), verify dose and sig.    If the refill does match what is on file, the RN can still proceed to approve the refill request.       If they do not match, route to the appropriate provider.             Passed - Medication indicated for associated diagnosis     The medication is prescribed for one or more of the following conditions:     Motion sickness   Nausea   Vomiting   Vertigo   Chemotherapy-induced nausea and vomiting   Radiation-induced nausea and vomiting,    Nausea and vomiting prophylaxis, migraine          Passed - Recent (12 mo) or future (90 days) visit with authorizing provider's specialty     The patient must have completed an in-person or virtual visit within the past 12 months or has a future visit scheduled within the next 90 days with the authorizing provider s specialty.  Urgent care and e-visits do not qualify as an office visit for this protocol.          Passed - Patient is 18 years of age or older

## 2025-04-05 RX ORDER — MULTIVIT-MIN/IRON/FOLIC ACID/K 18-600-40
1 CAPSULE ORAL DAILY
Qty: 90 CAPSULE | Refills: 3 | Status: SHIPPED | OUTPATIENT
Start: 2025-04-05

## 2025-04-05 RX ORDER — AMMONIUM LACTATE 12 G/100G
CREAM TOPICAL DAILY PRN
Qty: 140 G | Refills: 1 | Status: SHIPPED | OUTPATIENT
Start: 2025-04-05

## 2025-04-05 RX ORDER — MECLIZINE HYDROCHLORIDE 25 MG/1
25 TABLET ORAL 3 TIMES DAILY PRN
Qty: 30 TABLET | Refills: 1 | Status: SHIPPED | OUTPATIENT
Start: 2025-04-05

## 2025-04-05 RX ORDER — OLOPATADINE HYDROCHLORIDE 1 MG/ML
1 SOLUTION/ DROPS OPHTHALMIC 2 TIMES DAILY
Qty: 5 ML | Refills: 0 | Status: SHIPPED | OUTPATIENT
Start: 2025-04-05

## 2025-04-05 RX ORDER — FLUTICASONE PROPIONATE 50 MCG
1 SPRAY, SUSPENSION (ML) NASAL DAILY
Qty: 16 G | Refills: 11 | Status: SHIPPED | OUTPATIENT
Start: 2025-04-05

## 2025-04-05 RX ORDER — OMEPRAZOLE 20 MG/1
20 CAPSULE, DELAYED RELEASE ORAL DAILY
Qty: 90 CAPSULE | Refills: 3 | Status: SHIPPED | OUTPATIENT
Start: 2025-04-05

## 2025-04-14 ENCOUNTER — PATIENT OUTREACH (OUTPATIENT)
Dept: CARE COORDINATION | Facility: CLINIC | Age: 65
End: 2025-04-14
Payer: COMMERCIAL

## 2025-04-14 NOTE — PROGRESS NOTES
Clinic Care Coordination Contact  [facility]/Voicemail    Clinical Data: Care Coordinator Outreach    Outreach Documentation Number of Outreach Attempt   4/14/2025  12:36 PM 1     Left message on patient's voicemail with call back information and requested return call.    Plan: Care Coordinator will try to reach patient again in 10 business days.    Kirstin Rodriguez  [facility] Health Worker  [facility] Care Coordination   [place], [facility], [facility], [facility]  Office: [phone]

## 2025-04-29 ENCOUNTER — PATIENT OUTREACH (OUTPATIENT)
Dept: CARE COORDINATION | Facility: CLINIC | Age: 65
End: 2025-04-29
Payer: COMMERCIAL

## 2025-04-29 NOTE — PROGRESS NOTES
Clinic Care Coordination Contact  Care Coordination Clinician Chart Review    Situation: Patient chart reviewed by Care Coordinator.       Background: Care Coordination Program started: 9/26/2024. Initial assessment completed and patient-centered care plan(s) were developed with participation from patient. Lead CC handed patient off to CHW for continued outreaches.       Assessment: Per chart review, patient outreach completed by CC CHW on 4/14/25.  Patient is actively working to accomplish goal(s). Patient's goal(s) appropriate and relevant at this time. Patient is not due for updated Plan of Care.  Assessments will be completed annually or as needed/with change of patient status.      Care Plan: Appointments/Referrals       Problem: HP GENERAL PROBLEM       Goal: I would like to attend the following appointments/referrals.       Start Date: 10/3/2024    Recent Progress: 70%    Note:     Barriers: language barriermpleted  Strengths: family support  Patient expressed understanding of goal: yes  Action steps to achieve this goal:  1. I would like to call and schedule a dental exam.  My CHW is resending me the list of dental providers covered under [employer] today 3/10/25 by mail and TranscribeMe.  2. Eye appt-was referred to Retina Specialist for further eval has she attended this?-I let my CHW know that I have 2 eye appointments coming up soon and at this time I will see if one of them is a Retina Specialist.   3. Oncology appointment @ [phone] today for 2/6/25 @ 9:45. Oncology lab and doctor Follow up scheduled 3/20/25 appts starting at 10:15.-Completed  4.  Needs PCP follow up in March. Completed 3/3/25                                   Plan/Recommendations: The patient will continue working with Care Coordination to achieve goal(s) as above. CHW will continue outreaches at minimum every 30 days and will involve Lead CC as needed or if patient is ready to move to Maintenance. Lead CC will continue  to monitor CHW outreaches and patient's progress to goal(s) every 6 weeks.     Plan of Care updated and sent to patient: No

## 2025-04-30 ENCOUNTER — PATIENT OUTREACH (OUTPATIENT)
Dept: CARE COORDINATION | Facility: CLINIC | Age: 65
End: 2025-04-30
Payer: COMMERCIAL

## 2025-04-30 NOTE — PROGRESS NOTES
Clinic Care Coordination Contact  Community Health Worker Follow Up    Care Gaps:     Health Maintenance Due   Topic Date Due    DEPRESSION ACTION PLAN  Never done    PHQ-9  Never done    RSV VACCINE (1 - Risk 60-74 years 1-dose series) Never done    COVID-19 Vaccine (6 - 2024-25 season) 03/26/2025    MAMMO SCREENING  03/21/2025       Patient accepted scheduling phone number for  [facility]  to schedule independently     Care Plan:   Care Plan: Appointments/Referrals       Problem: HP GENERAL PROBLEM       Goal: I would like to attend the following appointments/referrals.       Start Date: 10/3/2024    This Visit's Progress: 70% Recent Progress: 70%    Note:     Barriers: language barriermpleted  Strengths: family support  Patient expressed understanding of goal: yes  Action steps to achieve this goal:  1. I would like to call and schedule a dental exam. My CHW is resending me the list of dental providers covered under [employer] today 3/10/25 by mail and [employer].   2. Eye appt-was referred to Retina Specialist for further eval has she attended this?-I let my CHW know that I have 2 eye appointments coming up soon and at this time I will see if one of them is a Retina Specialist. Went to  and was not able to be seen there because of my insurance. My CHW gave me the phone number Opthalmology at [phone].  3. Oncology appointment @ [phone] today for 2/6/25 @ 9:45. Oncology lab and doctor Follow up scheduled 3/20/25 appts starting at 10:15.-Completed  4.  Needs PCP follow up in March. Completed 3/3/25                                Intervention and Education during outreach: Patient went to Retina Specialist with [facility] and because of her insurance she was she was not able to be seen there. I gave patient the phone number for [facility] @ [phone] to schedule with a Retina specialist with [facility].     CHW Plan: CHW will follow up patient in about 1 month.      Kirstin Rodriguez  Community Health Worker  [facility]  Clinic Care Coordination   [place], [place], [place], [facility]  Office: [phone]

## 2025-06-03 ENCOUNTER — PATIENT OUTREACH (OUTPATIENT)
Dept: CARE COORDINATION | Facility: CLINIC | Age: 65
End: 2025-06-03
Payer: COMMERCIAL

## 2025-06-03 NOTE — PROGRESS NOTES
Clinic Care Coordination Contact  Community Health Worker Follow Up    Care Gaps:     Health Maintenance Due   Topic Date Due    DEPRESSION ACTION PLAN  Never done    PHQ-9  Never done    RSV VACCINE (1 - Risk 60-74 years 1-dose series) Never done    MAMMO SCREENING  03/21/2025    COVID-19 VACCINE (6 - 2024-25 season) 03/26/2025    A1C  06/03/2025       Patient accepted scheduling phone number for [facility]  to schedule independently     Care Plan:   Care Plan: Appointments/Referrals       Problem: HP GENERAL PROBLEM       Goal: I would like to attend the following appointments/referrals.       Start Date: 10/3/2024    This Visit's Progress: 70% Recent Progress: 70%    Note:     Barriers: language barriermpleted  Strengths: family support  Patient expressed understanding of goal: yes  Action steps to achieve this goal:  1. I would like to call and schedule a dental exam. My CHW helped me schedule an appointment with [facility] in [place] on 7/8/25.  2. Eye appt-was referred to Retina Specialist for further eval has she attended this?-I let my CHW know that I have 2 eye appointments coming up soon and at this time I will see if one of them is a Retina Specialist. Went to  and was not able to be seen there because of my insurance. My CHW gave me the phone number Opthalmology at [phone]. Continuous (MB)  3. Oncology appointment @ [phone] today for 2/6/25 @ 9:45. Oncology lab and doctor Follow up scheduled 3/20/25 appts starting at 10:15.-Completed  4.  Needs PCP follow up in March. Completed 3/3/25                                Intervention and Education during outreach: See goal for details.     CHW Plan: CHW will follow up with patient in about 1 month.     Kirstin Rodriguez  [facility] Health Worker  [facility]  Clinic Care Coordination   [place], [place], [place], [facility]  Office: [phone]

## 2025-06-04 ENCOUNTER — MYC REFILL (OUTPATIENT)
Dept: FAMILY MEDICINE | Facility: CLINIC | Age: 65
End: 2025-06-04
Payer: COMMERCIAL

## 2025-06-04 DIAGNOSIS — R42 DIZZINESS: ICD-10-CM

## 2025-06-04 DIAGNOSIS — E78.00 HIGH CHOLESTEROL: ICD-10-CM

## 2025-06-05 RX ORDER — ROSUVASTATIN CALCIUM 10 MG/1
10 TABLET, COATED ORAL DAILY
Qty: 90 TABLET | Refills: 1 | Status: SHIPPED | OUTPATIENT
Start: 2025-06-05

## 2025-06-05 RX ORDER — MECLIZINE HYDROCHLORIDE 25 MG/1
25 TABLET ORAL 3 TIMES DAILY PRN
Qty: 30 TABLET | Refills: 1 | Status: SHIPPED | OUTPATIENT
Start: 2025-06-05

## 2025-06-07 ENCOUNTER — HEALTH MAINTENANCE LETTER (OUTPATIENT)
Age: 65
End: 2025-06-07

## 2025-06-10 ENCOUNTER — PATIENT OUTREACH (OUTPATIENT)
Dept: CARE COORDINATION | Facility: CLINIC | Age: 65
End: 2025-06-10
Payer: COMMERCIAL

## 2025-06-10 NOTE — PROGRESS NOTES
Clinic Care Coordination Contact  Care Coordination Clinician Chart Review    Situation: Patient chart reviewed by Care Coordinator.       Background: Care Coordination Program started: 9/26/2024. Initial assessment completed and patient-centered care plan(s) were developed with participation from patient. Lead CC handed patient off to CHW for continued outreaches.       Assessment: Per chart review, patient outreach completed by CC CHW on 6/3/25.  Patient is actively working to accomplish goal(s). Patient's goal(s) appropriate and relevant at this time. Patient is due for updated Plan of Care.  Assessments will be completed annually or as needed/with change of patient status.      Care Plan: Appointments/Referrals       Problem: HP GENERAL PROBLEM       Goal: I would like to attend the following appointments/referrals.       Start Date: 10/3/2024    This Visit's Progress: 50% Recent Progress: 70%    Priority: High    Note:     Barriers: language barriermpleted  Strengths: family support  Patient expressed understanding of goal: yes  Action steps to achieve this goal:  1. I would like to call and schedule a dental exam. My CHW helped me schedule an appointment with [facility] in [place] on 7/8/25.  2. Eye appt-was referred to Retina Specialist for further eval has she attended this?-I let my CHW know that I have 2 eye appointments coming up soon and at this time I will see if one of them is a Retina Specialist. Went to  and was not able to be seen there because of my insurance. My CHW gave me the phone number Opthalmology at [phone]. Continuous (MB)  3. Current referral for Mammogram.  Please assist with [phone]                                 Plan/Recommendations: The patient will continue working with Care Coordination to achieve goal(s) as above. CHW will continue outreaches at minimum every 30 days and will involve Lead CC as needed or if patient is ready to move to  Maintenance. Lead CC will continue to monitor CHW outreaches and patient's progress to goal(s) every 6 weeks.     Plan of Care updated and sent to patient: Yes, via [facility]

## 2025-06-10 NOTE — LETTER
[facility] CARE COORDINATION  [address] [place] [address]    Gini 10, 2025        Doug Davidson  [address] [address]          Dear Doug,     Lauren is an updated Patient Centered Plan of Care for your continued enrollment in Care Coordination. Please let us know if you have additional questions, concerns, or goals that we can assist with.    Sincerely,    Марина Short RN  Clinic Care Coordination  [phone]      [facility]  Patient Centered Plan of Care  About Me:        Patient Name:  Doug Davidson    YOB: 1960  Age:         65 year old   [facility] MRN:    [id number] Telephone Information:  Home Phone [phone]   Mobile [phone]   Mobile [phone]       Address:  [address] [address] Email address:  [email or web address]      Emergency Contact(s)    Name Relationship Lgl Grd Work Phone Home Phone Mobile Phone   1. MARK HUSAIN Daughter-in-Law    [phone]   2. TEENA SOMMER Son    [phone]           Primary language:  [facility]     needed? Yes   [facility]:  [phone] op. 1  Other communication barriers:Language barrier    Preferred Method of Communication:     Current living arrangement: I live in a private home with family    Mobility Status/ Medical Equipment: Independent        Health Maintenance  Health Maintenance Reviewed: Up to date      My Access Plan  Medical Emergency 911   Primary Clinic Line [facility] [phone]   24 Hour Appointment Line [phone] or  [phone]) (toll-free)   24 Hour Nurse Line [phone] (toll-free)   Preferred Urgent Care [facility], [phone]     Preferred Hospital [facility]  [phone]     Preferred Pharmacy [facility] - [address]     Behavioral Health Crisis Line The National Suicide Prevention Lifeline at 1-345.439.3944  or Text/Call 988           My Care Team Members  Patient Care Team         Relationship Specialty Notifications Start End    Anitra ThayerNEIDA Esposito CNP PCP - General Nurse Practitioner  9/24/24     Phone: [phone] Fax: [phone] [address] [facility] [address]    Anitra ThayerNEIDA Esposito CNP Assigned PCP   4/23/24     Phone: [phone] Fax: [phone] [address] [facility] [address]    Gisele Bob MD Assigned Musculoskeletal Provider   4/23/24     Phone: [phone]          [address] [facility] [address]    Марина Short, RN Lead Care Coordinator Primary Care - CC Admissions 10/1/24     Phone: [phone]         Kirstin Rodriguez W Community Health Worker  Admissions 10/3/24     Phone: [phone]         Aicha Davis MD MD Infectious Diseases  10/10/24     Phone: [phone] Fax: [phone]         [address] [facility] [address]    Sam Borrero OD MD Optometrist  10/18/24     Phone: [phone] Fax: [phone] [address] [facility] [address]    Ganesh Lunsford MD MD Medical Oncology  11/6/24     Phone: [phone] Fax: [phone]         [address] [facility] [address]    [facility] Suzanne Leah, APRN CNP Nurse Practitioner Nurse Practitioner  11/6/24     Phone: [phone] Fax: [phone]         [address] [facility] [address]    Gita Ndiaye [facility] Pharmacist Pharmacist  11/12/24     Phone: [phone] Fax: [phone]         [address] [facility] [address]    Aicha Davis MD Assigned Infectious Disease Provider   11/23/24     Phone: [phone] Fax: [phone]         [address] [facility] [address] [facility] [address]    Sam Borrero OD Assigned Surgical Provider   11/23/24     Phone: [phone] Fax: [phone] [address] [place] [address]    Gita Ndiaye, [facility] Assigned MTM Pharmacist   11/23/24     Phone: [phone] Fax: [phone]          [address] [facility] [address]    Friedell, Peter E, MD MD Internal Medicine-Hematology & Oncology  12/12/24     Phone: [phone] Fax: [phone]         [address] [facility] [address]    Friedell, Peter E, MD Assigned Cancer Care Provider   2/23/25     Phone: [phone] Fax: [phone]         [address] [facility] [address]                My Care Plans  Self Management and Treatment Plan    Care Plan  Care Plan: Appointments/Referrals       Problem: HP GENERAL PROBLEM       Goal: I would like to attend the following appointments/referrals.       Start Date: 10/3/2024    This Visit's Progress: 50% Recent Progress: 70%    Priority: High    Note:     Barriers: language barriermpleted  Strengths: family support  Patient expressed understanding of goal: yes  Action steps to achieve this goal:  1. I would like to call and schedule a dental exam. My CHW helped me schedule an appointment with [facility] in [place] on 7/8/25.  2. Eye appt-was referred to Retina Specialist for further eval has she attended this?-I let my CHW know that I have 2 eye appointments coming up soon and at this time I will see if one of them is a Retina Specialist. Went to  and was not able to be seen there because of my insurance. My CHW gave me the phone number Opthalmology at [phone]. Continuous (MB)  3. Current referral for Mammogram.  Please assist with [phone]                              Action Plans on File:                       Advance Care Plans/Directives:   Advanced Care Plan/Directives on file: No    Discussed with patient/caregiver(s): Declined Further Information             My Medical and Care Information  Problem List   Patient Active Problem List   Diagnosis    Essential hypertension    CRISTINO (generalized anxiety disorder)    Gastritis, chronic    Heme positive stool    Hyperlipidemia LDL goal <100    History of Helicobacter pylori infection    Latent tuberculosis     Strongyloides stercoralis infection    Major depressive disorder, recurrent, moderate (H)    Tobacco use disorder    Pain in joint, ankle and foot, right    Type 2 diabetes mellitus without complication, without long-term current use of insulin (H)    Other insomnia    Dizziness    Hyponatremia    Syncope, unspecified syncope type    Age-related osteoporosis without current pathological fracture    Anemia, unspecified type    Iron deficiency anemia, unspecified iron deficiency anemia type    Adenomatous polyp of colon    Healthcare maintenance      Current Medications:  Please refer to the most recent medication list provided to you by your medical team and reach out to your provider with any questions or to make any corrections.    Care Coordination Start Date: 9/26/2024   Frequency of Care Coordination: monthly, more frequently as needed     Form Last Updated: 06/10/2025

## 2025-07-01 ENCOUNTER — PATIENT OUTREACH (OUTPATIENT)
Dept: CARE COORDINATION | Facility: CLINIC | Age: 65
End: 2025-07-01
Payer: COMMERCIAL

## 2025-07-01 NOTE — PROGRESS NOTES
Clinic Care Coordination Contact  [facility]/Voicemail    Clinical Data: Care Coordinator Outreach    Outreach Documentation Number of Outreach Attempt   7/1/2025   4:32 PM 1       Unable to leave a message due to: Patient's son took CHW'S number to have his mother call due there no being CTC on file.      Plan: Care Coordinator will try to reach patient again in 10 business days.      Char HERNANDEZ Ambulatory CHW  [facility] Care Coordination   [facility]   Office: [phone]

## 2025-07-03 RX ORDER — AMLODIPINE BESYLATE 5 MG/1
5 TABLET ORAL DAILY
OUTPATIENT
Start: 2025-07-03

## 2025-07-03 NOTE — TELEPHONE ENCOUNTER
Medication Question or Refill    Contacts       Contact Date/Time Type Contact Phone/Fax    07/03/2025 02:17 PM CDT Phone (Incoming) TEENA ROS (Emergency Contact) [phone] (M)            What medication are you calling about (include dose and sig)?: Amlodipine 5mg     Preferred Pharmacy:  [facility] [place] - [address]  [address] [facility] [address]  [facility] [address]  Phone: [phone] Fax: [phone]      Do you need a refill? Yes    Do you have any questions or concerns?  Yes: Patient states she has been taking the Amlodipine daily and has not stopped  Unsure why medication was removed from medication list  Needing refilled ASAP if she is to continue     Venkata Urrutia, Clinic RN  .[facility]

## 2025-07-17 ENCOUNTER — PATIENT OUTREACH (OUTPATIENT)
Dept: CARE COORDINATION | Facility: CLINIC | Age: 65
End: 2025-07-17
Payer: COMMERCIAL

## 2025-07-17 NOTE — PROGRESS NOTES
Clinic Care Coordination Contact  [facility]Voicemail    Clinical Data: Care Coordinator Outreach    Outreach Documentation Number of Outreach Attempt   7/1/2025   4:32 PM 1   7/17/2025  10:24 AM 2       Left message on patient's voicemail with call back information and requested return call.      Plan: Care Coordinator CHW sending chart to CCC RN to review for disenrollment or if CHW should make 1 more attempt.     Kirstin Rodriguez  [facility] Health Worker  [facility] Care Coordination   [facility], [place], [facility]  Office: [phone]

## 2025-07-17 NOTE — Clinical Note
I have called patient x2 with no return call. Please review chart for disenrollment or if I should make 1 more attempt. Thank you.

## 2025-07-30 ENCOUNTER — PATIENT OUTREACH (OUTPATIENT)
Dept: CARE COORDINATION | Facility: CLINIC | Age: 65
End: 2025-07-30
Payer: COMMERCIAL

## 2025-07-30 NOTE — PROGRESS NOTES
Clinic Care Coordination Contact  Care Coordination Clinician Chart Review    Situation: Patient chart reviewed by Care Coordinator.       Background: Care Coordination Program started: 9/26/2024. Initial assessment completed and patient-centered care plan(s) were developed with participation from patient. Lead CC handed patient off to CHW for continued outreaches.       Assessment: Per chart review, patient outreach completed by CC CHW on 7/17/25.  Patient is actively working to accomplish goal(s). Patient's goal(s) appropriate and relevant at this time. Patient is not due for updated Plan of Care.  Assessments will be completed annually or as needed/with change of patient status.      Care Plan: Appointments/Referrals       Problem: HP GENERAL PROBLEM       Goal: I would like to attend the following appointments/referrals.       Start Date: 10/3/2024    This Visit's Progress: 50% Recent Progress: 70%    Priority: High    Note:     Barriers: language barriermpleted  Strengths: family support  Patient expressed understanding of goal: yes  Action steps to achieve this goal:  1. I would like to call and schedule a dental exam. My CHW helped me schedule an appointment with [facility] in [place] on 7/8/25.  2. Eye appt-was referred to Retina Specialist for further eval has she attended this?-I let my CHW know that I have 2 eye appointments coming up soon and at this time I will see if one of them is a Retina Specialist. Went to  and was not able to be seen there because of my insurance. My CHW gave me the phone number Opthalmology at [phone]. Continuous (MB)  3. Current referral for Mammogram.  Please assist with [phone]                                 Plan/Recommendations: The patient will continue working with Care Coordination to achieve goal(s) as above. CHW will continue outreaches at minimum every 30 days and will involve Lead CC as needed or if patient is ready to move to  Maintenance. Lead CC will continue to monitor CHW outreaches and patient's progress to goal(s) every 6 weeks.     Plan of Care updated and sent to patient: No

## 2025-07-31 ENCOUNTER — PATIENT OUTREACH (OUTPATIENT)
Dept: CARE COORDINATION | Facility: CLINIC | Age: 65
End: 2025-07-31
Payer: COMMERCIAL

## 2025-07-31 NOTE — Clinical Note
Patient has been un enrolled from Clinic Care Coordination for being unable to reach. Please place another referral in the future if patient is interested in our assistance. Thank you!

## 2025-07-31 NOTE — LETTER
[facility] CARE COORDINATION  [address] [place] [address]    July 31, 2025    Doug Kellerar  [address] [place] [address]    ?????? Doug,      ?????? [facility] Care Coordination ??? ???????? ??????? ??? ??????? ??????? ???????? ??????? ???? ????? ????? ? ?? ???? ???????? ???? ??????? ????? ?????? ????? ????? ? ?? ???????? ???????? ??????? ?? ?????? ??????? ????? Kirstin Rodriguez ??? [phone] ?? ??????? ????????? ? ?? ????? ?????????? [phone] ?? ??????? ????????? ?      [facility] [place] ????????? ???? ??? ??? ???????? ????? ????????? ????? ?????? ????? ???? ???? ??? ? ???? ??????? ????????????? ????? ?????? !      ?????,        ?????? ??????:    Kirstin Rodriguez  Community Health Worker   [facility]  Clinic Care Coordination   [place], [place], [place] and [facility]  Office: [phone]

## 2025-08-11 ENCOUNTER — PATIENT OUTREACH (OUTPATIENT)
Dept: CARE COORDINATION | Facility: CLINIC | Age: 65
End: 2025-08-11
Payer: COMMERCIAL

## 2025-08-12 ENCOUNTER — OFFICE VISIT (OUTPATIENT)
Dept: AUDIOLOGY | Facility: CLINIC | Age: 65
End: 2025-08-12
Attending: NURSE PRACTITIONER
Payer: COMMERCIAL

## 2025-08-12 DIAGNOSIS — H90.71 MIXED HEARING LOSS OF RIGHT EAR: Primary | ICD-10-CM

## 2025-08-12 DIAGNOSIS — H90.5 SENSORINEURAL HEARING LOSS OF LEFT EAR: ICD-10-CM

## 2025-08-12 DIAGNOSIS — H90.3 BILATERAL SENSORINEURAL HEARING LOSS: ICD-10-CM

## 2025-08-19 ENCOUNTER — MYC REFILL (OUTPATIENT)
Dept: FAMILY MEDICINE | Facility: CLINIC | Age: 65
End: 2025-08-19
Payer: COMMERCIAL

## 2025-08-19 DIAGNOSIS — R42 DIZZINESS: ICD-10-CM

## 2025-08-19 DIAGNOSIS — E78.00 HIGH CHOLESTEROL: ICD-10-CM

## 2025-08-19 DIAGNOSIS — K59.00 CONSTIPATION, UNSPECIFIED CONSTIPATION TYPE: Primary | ICD-10-CM

## 2025-08-22 ENCOUNTER — TRANSFERRED RECORDS (OUTPATIENT)
Dept: HEALTH INFORMATION MANAGEMENT | Facility: CLINIC | Age: 65
End: 2025-08-22
Payer: COMMERCIAL

## 2025-08-22 LAB — RETINOPATHY: NEGATIVE

## 2025-08-23 RX ORDER — MECLIZINE HYDROCHLORIDE 25 MG/1
25 TABLET ORAL 3 TIMES DAILY PRN
Qty: 30 TABLET | Refills: 1 | Status: SHIPPED | OUTPATIENT
Start: 2025-08-23

## 2025-08-23 RX ORDER — DOCUSATE SODIUM 50MG AND SENNOSIDES 8.6MG 8.6; 5 MG/1; MG/1
1 TABLET, FILM COATED ORAL 2 TIMES DAILY
Qty: 180 TABLET | Refills: 0 | Status: SHIPPED | OUTPATIENT
Start: 2025-08-23

## 2025-08-23 RX ORDER — ROSUVASTATIN CALCIUM 10 MG/1
10 TABLET, COATED ORAL DAILY
Qty: 90 TABLET | Refills: 1 | Status: SHIPPED | OUTPATIENT
Start: 2025-08-23

## 2025-09-04 ENCOUNTER — APPOINTMENT (OUTPATIENT)
Dept: OPTOMETRY | Facility: CLINIC | Age: 65
End: 2025-09-04
Payer: COMMERCIAL

## 2025-09-04 PROCEDURE — 92341 FIT SPECTACLES BIFOCAL: CPT | Performed by: OPTOMETRIST

## 2025-09-23 ENCOUNTER — PRE VISIT (OUTPATIENT)
Dept: OTOLARYNGOLOGY | Facility: CLINIC | Age: 65
End: 2025-09-23

## (undated) DEVICE — GOWN IMPERVIOUS 2XL BLUE

## (undated) DEVICE — KIT ENDO TURNOVER/PROCEDURE CARRY-ON 101822

## (undated) DEVICE — ENDO FORCEP BX CAPTURA PRO SPIKE G50696

## (undated) DEVICE — KIT ENDO FIRST STEP DISINFECTANT 200ML W/POUCH EP-4

## (undated) DEVICE — SPECIMEN CONTAINER 3OZ W/FORMALIN 59901

## (undated) DEVICE — SOL WATER IRRIG 500ML BOTTLE 2F7113

## (undated) DEVICE — SUCTION MANIFOLD NEPTUNE 2 SYS 1 PORT 702-025-000

## (undated) DEVICE — TUBING SUCTION MEDI-VAC 1/4"X20' N620A